# Patient Record
Sex: FEMALE | Race: WHITE | NOT HISPANIC OR LATINO | Employment: FULL TIME | URBAN - METROPOLITAN AREA
[De-identification: names, ages, dates, MRNs, and addresses within clinical notes are randomized per-mention and may not be internally consistent; named-entity substitution may affect disease eponyms.]

---

## 2017-02-28 ENCOUNTER — OFFICE VISIT - HEALTHEAST (OUTPATIENT)
Dept: FAMILY MEDICINE | Facility: CLINIC | Age: 27
End: 2017-02-28

## 2017-02-28 DIAGNOSIS — E55.9 VITAMIN D DEFICIENCY DISEASE: ICD-10-CM

## 2017-02-28 DIAGNOSIS — L30.9 DERMATITIS: ICD-10-CM

## 2017-02-28 DIAGNOSIS — R53.82 CHRONIC FATIGUE: ICD-10-CM

## 2017-02-28 ASSESSMENT — MIFFLIN-ST. JEOR: SCORE: 1309.63

## 2017-03-08 ENCOUNTER — OFFICE VISIT - HEALTHEAST (OUTPATIENT)
Dept: FAMILY MEDICINE | Facility: CLINIC | Age: 27
End: 2017-03-08

## 2017-03-08 DIAGNOSIS — J02.9 SORE THROAT: ICD-10-CM

## 2017-03-08 DIAGNOSIS — J11.1 INFLUENZA: ICD-10-CM

## 2017-03-08 DIAGNOSIS — R05.9 COUGH: ICD-10-CM

## 2017-05-17 ENCOUNTER — HOSPITAL ENCOUNTER (OUTPATIENT)
Dept: MRI IMAGING | Facility: HOSPITAL | Age: 27
Discharge: HOME | End: 2017-05-17
Attending: FAMILY MEDICINE

## 2017-05-17 ENCOUNTER — OFFICE VISIT - HEALTHEAST (OUTPATIENT)
Dept: FAMILY MEDICINE | Facility: CLINIC | Age: 27
End: 2017-05-17

## 2017-05-17 DIAGNOSIS — R51.9 HEADACHE: ICD-10-CM

## 2017-05-17 DIAGNOSIS — R51.0 POSITIONAL HEADACHE: ICD-10-CM

## 2017-05-17 DIAGNOSIS — H91.90 HEARING LOSS: ICD-10-CM

## 2017-05-17 DIAGNOSIS — L65.9 HAIR LOSS: ICD-10-CM

## 2017-05-17 NOTE — ASSESSMENT & PLAN NOTE
Left side, hearing loss to finger rubbing in setting of left side headache x 5 days which is positional.  Mri brain ordered.     Addendum 5/18/2017  Mri brain shows no mass lesion but there is a small cystic structure near pituitary.    I will contact neurology to see if neurology consult is appropriate and if they want any other studies prior to seeing her.    I called and spoke to Dr. Car Serrano at [facility] in [place].  He is at the positional headache made him think of a spontaneous spinal fluid leak.  He recommended an MRI of the C-spine and T-spine to look specifically for extravasation of spinal fluid out of the dura.  If that is the case a blind lumbar puncture with blood patch may be adequate to treat this.  He also mentioned that if her pain is significant enough it is also reasonable to hospitalize to expedite treatment of this.    Dr. Serrano also suggested that we discuss whether the quality of the headache is pulsatile as that may indicate sagittal sinus thrombosis although Regina does not seem to have any hypercoagulable risk factors (i.e. she is not on any oral contraceptive pills), However if that was the case we would need to do an MRV of the brain.

## 2017-05-17 NOTE — ASSESSMENT & PLAN NOTE
Left side, hearing loss to finger rubbing in setting of left side headache x 5 days which is positional.    Mri brain ordered.

## 2017-05-18 ENCOUNTER — COMMUNICATION - HEALTHEAST (OUTPATIENT)
Dept: FAMILY MEDICINE | Facility: CLINIC | Age: 27
End: 2017-05-18

## 2017-05-22 ENCOUNTER — COMMUNICATION - HEALTHEAST (OUTPATIENT)
Dept: FAMILY MEDICINE | Facility: CLINIC | Age: 27
End: 2017-05-22

## 2017-05-22 ENCOUNTER — COMMUNICATION - HEALTHEAST (OUTPATIENT)
Dept: ADMINISTRATIVE | Facility: CLINIC | Age: 27
End: 2017-05-22

## 2017-05-22 DIAGNOSIS — E23.7 PITUITARY DISEASE (H): ICD-10-CM

## 2017-05-22 DIAGNOSIS — E06.9 THYROIDITIS: ICD-10-CM

## 2017-05-22 DIAGNOSIS — F41.1 ANXIETY STATE: ICD-10-CM

## 2017-05-25 ENCOUNTER — AMBULATORY - HEALTHEAST (OUTPATIENT)
Dept: LAB | Facility: CLINIC | Age: 27
End: 2017-05-25

## 2017-05-25 ENCOUNTER — OFFICE VISIT - HEALTHEAST (OUTPATIENT)
Dept: FAMILY MEDICINE | Facility: CLINIC | Age: 27
End: 2017-05-25

## 2017-05-25 DIAGNOSIS — E87.6 HYPOKALEMIA: ICD-10-CM

## 2017-05-25 DIAGNOSIS — E06.9 THYROIDITIS: ICD-10-CM

## 2017-05-25 DIAGNOSIS — F41.1 ANXIETY STATE: ICD-10-CM

## 2017-05-25 DIAGNOSIS — E23.6 PITUITARY MASS (H): ICD-10-CM

## 2017-05-25 DIAGNOSIS — E55.9 VITAMIN D DEFICIENCY: ICD-10-CM

## 2017-05-25 DIAGNOSIS — G44.52 NEW DAILY PERSISTENT HEADACHE: ICD-10-CM

## 2017-05-25 DIAGNOSIS — E23.7 PITUITARY DISEASE (H): ICD-10-CM

## 2017-05-25 LAB
CREAT SERPL-MCNC: 0.8 MG/DL (ref 0.6–1.1)
GFR SERPL CREATININE-BSD FRML MDRD: >60 ML/MIN/1.73M2

## 2017-05-30 ENCOUNTER — COMMUNICATION - HEALTHEAST (OUTPATIENT)
Dept: ENDOCRINOLOGY | Facility: CLINIC | Age: 27
End: 2017-05-30

## 2017-05-30 DIAGNOSIS — E23.7 PITUITARY DISEASE (H): ICD-10-CM

## 2017-06-27 ENCOUNTER — OFFICE VISIT - HEALTHEAST (OUTPATIENT)
Dept: FAMILY MEDICINE | Facility: CLINIC | Age: 27
End: 2017-06-27

## 2017-06-27 DIAGNOSIS — N64.4 BREAST TENDERNESS IN FEMALE: ICD-10-CM

## 2017-06-27 DIAGNOSIS — E23.7 PITUITARY DISEASE (H): ICD-10-CM

## 2017-06-27 DIAGNOSIS — E06.9 THYROIDITIS: ICD-10-CM

## 2017-06-27 DIAGNOSIS — N64.3 GALACTORRHEA: ICD-10-CM

## 2017-06-27 DIAGNOSIS — R10.9 ABDOMINAL CRAMPING: ICD-10-CM

## 2017-06-27 DIAGNOSIS — E55.9 VITAMIN D DEFICIENCY: ICD-10-CM

## 2017-06-27 DIAGNOSIS — R51.9 HEADACHE, UNSPECIFIED HEADACHE TYPE: ICD-10-CM

## 2017-06-27 DIAGNOSIS — N92.1 METRORRHAGIA: ICD-10-CM

## 2017-06-27 DIAGNOSIS — E87.6 HYPOKALEMIA: ICD-10-CM

## 2017-06-27 ASSESSMENT — MIFFLIN-ST. JEOR: SCORE: 1298.06

## 2017-06-28 ENCOUNTER — COMMUNICATION - HEALTHEAST (OUTPATIENT)
Dept: FAMILY MEDICINE | Facility: CLINIC | Age: 27
End: 2017-06-28

## 2017-07-13 ENCOUNTER — OFFICE VISIT - HEALTHEAST (OUTPATIENT)
Dept: FAMILY MEDICINE | Facility: CLINIC | Age: 27
End: 2017-07-13

## 2017-07-13 ENCOUNTER — HOSPITAL ENCOUNTER (OUTPATIENT)
Dept: MAMMOGRAPHY | Facility: CLINIC | Age: 27
Discharge: HOME | End: 2017-07-13
Attending: FAMILY MEDICINE

## 2017-07-13 ENCOUNTER — HOSPITAL ENCOUNTER (OUTPATIENT)
Dept: ULTRASOUND IMAGING | Facility: CLINIC | Age: 27
Discharge: HOME | End: 2017-07-13
Attending: FAMILY MEDICINE

## 2017-07-13 ENCOUNTER — COMMUNICATION - HEALTHEAST (OUTPATIENT)
Dept: FAMILY MEDICINE | Facility: CLINIC | Age: 27
End: 2017-07-13

## 2017-07-13 ENCOUNTER — HOSPITAL ENCOUNTER (OUTPATIENT)
Dept: CT IMAGING | Facility: CLINIC | Age: 27
Discharge: HOME | End: 2017-07-13
Attending: FAMILY MEDICINE

## 2017-07-13 DIAGNOSIS — N64.3 GALACTORRHEA: ICD-10-CM

## 2017-07-13 DIAGNOSIS — R10.32 LEFT LOWER QUADRANT PAIN: ICD-10-CM

## 2017-07-13 DIAGNOSIS — O92.6 GALACTORRHEA (CODE): ICD-10-CM

## 2017-07-13 DIAGNOSIS — N64.4 BREAST TENDERNESS IN FEMALE: ICD-10-CM

## 2017-07-13 ASSESSMENT — MIFFLIN-ST. JEOR: SCORE: 1308.04

## 2017-08-03 ENCOUNTER — OFFICE VISIT - HEALTHEAST (OUTPATIENT)
Dept: ENDOCRINOLOGY | Facility: CLINIC | Age: 27
End: 2017-08-03

## 2017-08-03 DIAGNOSIS — D49.7 PITUITARY TUMOR: ICD-10-CM

## 2017-08-03 ASSESSMENT — MIFFLIN-ST. JEOR: SCORE: 1298.06

## 2017-08-11 ENCOUNTER — COMMUNICATION - HEALTHEAST (OUTPATIENT)
Dept: ENDOCRINOLOGY | Facility: CLINIC | Age: 27
End: 2017-08-11

## 2017-08-16 ENCOUNTER — HOSPITAL ENCOUNTER (OUTPATIENT)
Dept: PHYSICAL MEDICINE AND REHAB | Facility: CLINIC | Age: 27
Discharge: HOME | End: 2017-08-16
Attending: PHYSICIAN ASSISTANT

## 2017-08-16 DIAGNOSIS — M54.50 ACUTE RIGHT-SIDED LOW BACK PAIN WITHOUT SCIATICA: ICD-10-CM

## 2017-08-16 ASSESSMENT — MIFFLIN-ST. JEOR: SCORE: 1314.16

## 2017-08-24 ENCOUNTER — OFFICE VISIT - HEALTHEAST (OUTPATIENT)
Dept: PHYSICAL THERAPY | Facility: REHABILITATION | Age: 27
End: 2017-08-24

## 2017-08-24 DIAGNOSIS — M54.6 THORACOLUMBAR BACK PAIN: ICD-10-CM

## 2017-08-24 DIAGNOSIS — M54.50 LUMBAR SPINE PAINFUL ON MOVEMENT: ICD-10-CM

## 2017-08-24 DIAGNOSIS — M79.18 MYOFASCIAL MUSCLE PAIN: ICD-10-CM

## 2017-08-24 DIAGNOSIS — M54.50 THORACOLUMBAR BACK PAIN: ICD-10-CM

## 2017-09-07 ENCOUNTER — AMBULATORY - HEALTHEAST (OUTPATIENT)
Dept: LAB | Facility: CLINIC | Age: 27
End: 2017-09-07

## 2017-09-07 DIAGNOSIS — Z00.6 RESEARCH STUDY PATIENT: ICD-10-CM

## 2017-10-29 ENCOUNTER — COMMUNICATION - HEALTHEAST (OUTPATIENT)
Dept: FAMILY MEDICINE | Facility: CLINIC | Age: 27
End: 2017-10-29

## 2017-10-29 DIAGNOSIS — F41.9 ANXIETY: ICD-10-CM

## 2017-11-15 ENCOUNTER — OFFICE VISIT - HEALTHEAST (OUTPATIENT)
Dept: MIDWIFE SERVICES | Facility: CLINIC | Age: 27
End: 2017-11-15

## 2017-11-15 DIAGNOSIS — R10.2 PELVIC PAIN: ICD-10-CM

## 2017-11-15 DIAGNOSIS — Z12.4 PAP SMEAR FOR CERVICAL CANCER SCREENING: ICD-10-CM

## 2017-11-15 DIAGNOSIS — Z00.00 HEALTH CARE MAINTENANCE: ICD-10-CM

## 2017-11-15 ASSESSMENT — MIFFLIN-ST. JEOR: SCORE: 1268.8

## 2018-01-25 ENCOUNTER — COMMUNICATION - HEALTHEAST (OUTPATIENT)
Dept: LAB | Facility: CLINIC | Age: 28
End: 2018-01-25

## 2018-01-25 DIAGNOSIS — E06.9 THYROIDITIS: ICD-10-CM

## 2018-02-06 ENCOUNTER — OFFICE VISIT - HEALTHEAST (OUTPATIENT)
Dept: FAMILY MEDICINE | Facility: CLINIC | Age: 28
End: 2018-02-06

## 2018-02-06 DIAGNOSIS — H66.91 RIGHT OTITIS MEDIA: ICD-10-CM

## 2018-02-06 DIAGNOSIS — J02.9 SORE THROAT: ICD-10-CM

## 2018-02-06 LAB — DEPRECATED S PYO AG THROAT QL EIA: NORMAL

## 2018-02-06 ASSESSMENT — MIFFLIN-ST. JEOR: SCORE: 1264.27

## 2018-02-07 LAB — GROUP A STREP BY PCR: NORMAL

## 2018-02-08 ENCOUNTER — COMMUNICATION - HEALTHEAST (OUTPATIENT)
Dept: SCHEDULING | Facility: CLINIC | Age: 28
End: 2018-02-08

## 2018-02-16 ENCOUNTER — OFFICE VISIT - HEALTHEAST (OUTPATIENT)
Dept: FAMILY MEDICINE | Facility: CLINIC | Age: 28
End: 2018-02-16

## 2018-02-16 DIAGNOSIS — H66.001 RIGHT ACUTE SUPPURATIVE OTITIS MEDIA: ICD-10-CM

## 2018-02-16 DIAGNOSIS — F41.9 ANXIETY: ICD-10-CM

## 2018-05-14 ENCOUNTER — OFFICE VISIT - HEALTHEAST (OUTPATIENT)
Dept: FAMILY MEDICINE | Facility: CLINIC | Age: 28
End: 2018-05-14

## 2018-05-14 DIAGNOSIS — H10.9 BACTERIAL CONJUNCTIVITIS OF BOTH EYES: ICD-10-CM

## 2018-05-14 DIAGNOSIS — B96.89 BACTERIAL CONJUNCTIVITIS OF BOTH EYES: ICD-10-CM

## 2018-05-14 ASSESSMENT — MIFFLIN-ST. JEOR: SCORE: 1291.03

## 2018-08-28 ENCOUNTER — COMMUNICATION - HEALTHEAST (OUTPATIENT)
Dept: SCHEDULING | Facility: CLINIC | Age: 28
End: 2018-08-28

## 2018-08-28 ENCOUNTER — HOSPITAL ENCOUNTER (OUTPATIENT)
Dept: ULTRASOUND IMAGING | Facility: CLINIC | Age: 28
Discharge: HOME | End: 2018-08-28
Attending: FAMILY MEDICINE

## 2018-08-28 ENCOUNTER — OFFICE VISIT - HEALTHEAST (OUTPATIENT)
Dept: FAMILY MEDICINE | Facility: CLINIC | Age: 28
End: 2018-08-28

## 2018-08-28 DIAGNOSIS — R10.30 LOWER ABDOMINAL PAIN: ICD-10-CM

## 2018-08-28 LAB
ALBUMIN UR-MCNC: NEGATIVE MG/DL
APPEARANCE UR: CLEAR
BACTERIA #/AREA URNS HPF: ABNORMAL HPF
BILIRUB UR QL STRIP: NEGATIVE
COLOR UR AUTO: YELLOW
ERYTHROCYTE [DISTWIDTH] IN BLOOD BY AUTOMATED COUNT: 12.5 % (ref 11–14.5)
GLUCOSE UR STRIP-MCNC: NEGATIVE MG/DL
HCG UR QL: NEGATIVE
HCT VFR BLD AUTO: 39.7 % (ref 35–47)
HGB BLD-MCNC: 13.6 G/DL (ref 12–16)
HGB UR QL STRIP: NEGATIVE
KETONES UR STRIP-MCNC: NEGATIVE MG/DL
LEUKOCYTE ESTERASE UR QL STRIP: ABNORMAL
MCH RBC QN AUTO: 30.1 PG (ref 27–34)
MCHC RBC AUTO-ENTMCNC: 34.2 G/DL (ref 32–36)
MCV RBC AUTO: 88 FL (ref 80–100)
NITRATE UR QL: NEGATIVE
PH UR STRIP: 7 [PH] (ref 5–8)
PLATELET # BLD AUTO: 209 THOU/UL (ref 140–440)
PMV BLD AUTO: 7.4 FL (ref 7–10)
RBC # BLD AUTO: 4.51 MILL/UL (ref 3.8–5.4)
RBC #/AREA URNS AUTO: ABNORMAL HPF
SP GR UR STRIP: 1.01 (ref 1–1.03)
SP GR UR STRIP: 1.01 (ref 1–1.03)
SQUAMOUS #/AREA URNS AUTO: ABNORMAL LPF
UROBILINOGEN UR STRIP-ACNC: ABNORMAL
WBC #/AREA URNS AUTO: ABNORMAL HPF
WBC: 6.2 THOU/UL (ref 4–11)

## 2018-08-29 ENCOUNTER — COMMUNICATION - HEALTHEAST (OUTPATIENT)
Dept: FAMILY MEDICINE | Facility: CLINIC | Age: 28
End: 2018-08-29

## 2018-08-29 LAB — BACTERIA SPEC CULT: NORMAL

## 2021-03-03 ENCOUNTER — OFFICE VISIT - HEALTHEAST (OUTPATIENT)
Dept: FAMILY MEDICINE | Facility: CLINIC | Age: 31
End: 2021-03-03

## 2021-03-03 ENCOUNTER — RECORDS - HEALTHEAST (OUTPATIENT)
Dept: GENERAL RADIOLOGY | Facility: CLINIC | Age: 31
End: 2021-03-03

## 2021-03-03 ENCOUNTER — COMMUNICATION - HEALTHEAST (OUTPATIENT)
Dept: FAMILY MEDICINE | Facility: CLINIC | Age: 31
End: 2021-03-03

## 2021-03-03 DIAGNOSIS — M25.561 ACUTE PAIN OF RIGHT KNEE: ICD-10-CM

## 2021-03-03 DIAGNOSIS — M25.361 INSTABILITY OF RIGHT KNEE JOINT: ICD-10-CM

## 2021-03-03 DIAGNOSIS — M25.561 PAIN IN RIGHT KNEE: ICD-10-CM

## 2021-03-04 ENCOUNTER — HOSPITAL ENCOUNTER (OUTPATIENT)
Dept: MRI IMAGING | Facility: CLINIC | Age: 31
Discharge: HOME | End: 2021-03-04
Attending: NURSE PRACTITIONER

## 2021-03-04 DIAGNOSIS — M25.561 ACUTE PAIN OF RIGHT KNEE: ICD-10-CM

## 2021-04-21 ENCOUNTER — RECORDS - HEALTHEAST (OUTPATIENT)
Dept: ADMINISTRATIVE | Facility: OTHER | Age: 31
End: 2021-04-21

## 2021-04-21 ENCOUNTER — OFFICE VISIT - HEALTHEAST (OUTPATIENT)
Dept: FAMILY MEDICINE | Facility: CLINIC | Age: 31
End: 2021-04-21

## 2021-04-21 DIAGNOSIS — M62.838 MUSCLE SPASM: ICD-10-CM

## 2021-04-21 RX ORDER — CYCLOBENZAPRINE HCL 10 MG
10 TABLET ORAL 3 TIMES DAILY PRN
Qty: 20 TABLET | Refills: 0 | Status: SHIPPED | OUTPATIENT
Start: 2021-04-21 | End: 2021-11-09

## 2021-04-27 ENCOUNTER — OFFICE VISIT - HEALTHEAST (OUTPATIENT)
Dept: FAMILY MEDICINE | Facility: CLINIC | Age: 31
End: 2021-04-27

## 2021-04-27 DIAGNOSIS — L81.9 ATYPICAL PIGMENTED SKIN LESION: ICD-10-CM

## 2021-04-27 DIAGNOSIS — Z00.00 ROUTINE GENERAL MEDICAL EXAMINATION AT A HEALTH CARE FACILITY: ICD-10-CM

## 2021-04-27 LAB
CHOLEST SERPL-MCNC: 186 MG/DL
FASTING STATUS PATIENT QL REPORTED: NO
HBA1C MFR BLD: 4.9 %
HDLC SERPL-MCNC: 64 MG/DL

## 2021-04-27 ASSESSMENT — MIFFLIN-ST. JEOR: SCORE: 1336.95

## 2021-04-28 LAB
HPV SOURCE: ABNORMAL
HUMAN PAPILLOMA VIRUS 16 DNA: NEGATIVE
HUMAN PAPILLOMA VIRUS 18 DNA: NEGATIVE
HUMAN PAPILLOMA VIRUS FINAL DIAGNOSIS: ABNORMAL
HUMAN PAPILLOMA VIRUS OTHER HR: POSITIVE
SPECIMEN DESCRIPTION: ABNORMAL

## 2021-05-04 ENCOUNTER — COMMUNICATION - HEALTHEAST (OUTPATIENT)
Dept: OBGYN | Facility: CLINIC | Age: 31
End: 2021-05-04

## 2021-05-04 DIAGNOSIS — R87.810 CERVICAL HIGH RISK HPV (HUMAN PAPILLOMAVIRUS) TEST POSITIVE: ICD-10-CM

## 2021-05-28 ENCOUNTER — RECORDS - HEALTHEAST (OUTPATIENT)
Dept: ADMINISTRATIVE | Facility: CLINIC | Age: 31
End: 2021-05-28

## 2021-05-29 ENCOUNTER — HEALTH MAINTENANCE LETTER (OUTPATIENT)
Age: 31
End: 2021-05-29

## 2021-05-30 ENCOUNTER — RECORDS - HEALTHEAST (OUTPATIENT)
Dept: ADMINISTRATIVE | Facility: CLINIC | Age: 31
End: 2021-05-30

## 2021-05-30 VITALS — WEIGHT: 130 LBS | BODY MASS INDEX: 21.63 KG/M2

## 2021-05-30 VITALS — BODY MASS INDEX: 20.82 KG/M2 | WEIGHT: 129 LBS

## 2021-05-30 VITALS — WEIGHT: 132 LBS | HEIGHT: 65 IN | BODY MASS INDEX: 21.99 KG/M2

## 2021-05-31 VITALS — WEIGHT: 127 LBS | BODY MASS INDEX: 21.13 KG/M2

## 2021-05-31 VITALS — BODY MASS INDEX: 20.49 KG/M2 | WEIGHT: 123 LBS | HEIGHT: 65 IN

## 2021-05-31 VITALS — WEIGHT: 133.4 LBS | BODY MASS INDEX: 22.23 KG/M2 | HEIGHT: 65 IN

## 2021-05-31 VITALS — BODY MASS INDEX: 22.16 KG/M2 | HEIGHT: 65 IN | WEIGHT: 133 LBS

## 2021-05-31 VITALS — BODY MASS INDEX: 21.86 KG/M2 | HEIGHT: 65 IN | WEIGHT: 131.2 LBS

## 2021-05-31 VITALS — BODY MASS INDEX: 21.86 KG/M2 | WEIGHT: 131.2 LBS | HEIGHT: 65 IN

## 2021-05-31 VITALS — HEIGHT: 65 IN | BODY MASS INDEX: 20.33 KG/M2 | WEIGHT: 122 LBS

## 2021-06-01 VITALS — HEIGHT: 65 IN | WEIGHT: 127.9 LBS | BODY MASS INDEX: 21.31 KG/M2

## 2021-06-01 VITALS — WEIGHT: 134 LBS | BODY MASS INDEX: 22.3 KG/M2

## 2021-06-05 VITALS — BODY MASS INDEX: 22.47 KG/M2 | WEIGHT: 135 LBS

## 2021-06-09 NOTE — PROGRESS NOTES
Subjective:    Regina Nguyen is seen today for recent illness.  Sore throat over past 3 days however awoke this morning feeling like she was hit by 17 semitruck.  Body aches.  Headache more posterior.  No neck pain.  Mild nasal congestion only.  No shortness of breath.  Harsh cough, nonproductive.  No nausea or vomiting.  No diarrhea.  Did not receive flu shot this season.  Comprehensive review of systems as above otherwise all negative.     - Pernell x 6/19/15  1 daughter - Grayson  1 son - Irving  5th year senior (graduated in ) [employer] - major - elementary ed; major - communications   No smoke   No EtOH   Mom - Glenny (Ita) parents    Dad - radha. fib  2 bros -   MGD - DM   PGD - bone CA   MGM - melanoma, emphysema ()   PGM - CVA   Mat uncle - bone CA   Surgeries: [facility] ~    Cell #[phone], message OK (08 results)   Pt needs pap done   Patient is on Goal List for STD Screening.    Past Surgical History:   Procedure Laterality Date     APPENDECTOMY          Family History   Problem Relation Age of Onset     Heart disease Father      a. fib     Heart attack Mother 44        Past Medical History:   Diagnosis Date     Anemia      Disease of thyroid gland     hyperthyroidism     Mental disorder     anxiety     Thyroiditis         Social History   Substance Use Topics     Smoking status: Never Smoker     Smokeless tobacco: Never Used     Alcohol use No      Comment: ever so often        Current Outpatient Prescriptions   Medication Sig Dispense Refill     ergocalciferol (ERGOCALCIFEROL) 50,000 unit capsule Take 1 capsule (50,000 Units total) by mouth once a week for 52 doses. 12 capsule 3     ketoconazole (NIZORAL) 2 % cream Apply topically 2 (two) times a day. Mixed with steroid and apply lightly to affected areas 30 g 1     oseltamivir (TAMIFLU) 75 MG capsule Take 1 capsule (75 mg total) by mouth 2 (two) times a day for 5 days. 10 capsule 0     triamcinolone (KENALOG) 0.1  % cream Apply topically 2 (two) times a day. Mix with antifungal and apply lightly to affected area 30 g 1     No current facility-administered medications for this visit.           Objective:    Vitals:    03/08/17 1124   BP: 100/60   Temp: 98.8  F (37.1  C)   Weight: 130 lb (59 kg)      Body mass index is 21.63 kg/(m^2).    Alert.  No apparent distress however mildly ill.  Nontoxic.  Cooperative.  HEENT exam with conjunctival injection, mild.  TMs normal.  Nasopharynx with increased congestion.  Oropharynx with posterior erythema without postnasal drainage.  Neck supple with shotty lymphadenopathy only.  Chest clear.  Cardiac exam regular.  Harsh cough frequently.  Extremities warm and dry.  No rash.      Assessment:    1. Influenza  oseltamivir (TAMIFLU) 75 MG capsule   2. Sore throat  Rapid Strep A Screen-Throat    Group A Strep, RNA Direct Detection, Throat   3. Cough  Influenza A/B Rapid Test         Plan:    Rapid influenza negative.  Was not immunized this season.  Will treat empirically with Tamiflu 75 mg twice daily ×5 days.  Rapid strep was negative.  Symptomatic treatments also reviewed her current myalgias, cough, sore throat and malaise.

## 2021-06-09 NOTE — PROGRESS NOTES
Assessment/Plan:     1. Dermatitis  May be tinea but if symptoms worsen or do not improve with possible consider biopsy to rule out cutaneous lupus due to history of some rheumatological issues.  - triamcinolone (KENALOG) 0.1 % cream; Apply topically 2 (two) times a day. Mix with antifungal and apply lightly to affected area  Dispense: 30 g; Refill: 1  - ketoconazole (NIZORAL) 2 % cream; Apply topically 2 (two) times a day. Mixed with steroid and apply lightly to affected areas  Dispense: 30 g; Refill: 1    2. Chronic fatigue  Likely due to rheumatological issues and diagnoses of fibromyalgia from rheumatologist.  Discussed anti-inflammatory diet at length.  We will continue to follow with primary care and rheumatologist.  No indication for further lab testing at this time as everything has been recently checked.    3. Vitamin D deficiency disease  Discussed possibly repeating vitamin D but as patient is not regularly taking supplements and spit that is still likely low and will provide refill of prescription dose.  - ergocalciferol (ERGOCALCIFEROL) 50,000 unit capsule; Take 1 capsule (50,000 Units total) by mouth once a week for 52 doses.  Dispense: 12 capsule; Refill: 3        Subjective:      Regina Nguyen is a 26 y.o. female comes in today with a few concerns.  My first time meeting with her so briefly reviewed her history.  The last year she has been dealing with chronic fatigue and body aches.  She has been evaluated at primary care also rheumatology.  She does have some elevated complement and was thought to possibly have fibromyalgia.  I reviewed the visit notes and the labs.  She has had multiple labs.  Is not taking any vitamins or supplements despite low vitamin D.  She has concerns about her skin.  States there is a spot that has been there for about 8 months she feels as though it has tripled in size.  It is not itchy but she states that it feels sore underneath.  It is not raised there is no  "discharge from it.  She has never put the cream on it or tried anything to make it better.  No other new concerns    Current Outpatient Prescriptions   Medication Sig Dispense Refill     ergocalciferol (ERGOCALCIFEROL) 50,000 unit capsule Take 1 capsule (50,000 Units total) by mouth once a week for 52 doses. 12 capsule 3     ketoconazole (NIZORAL) 2 % cream Apply topically 2 (two) times a day. Mixed with steroid and apply lightly to affected areas 30 g 1     triamcinolone (KENALOG) 0.1 % cream Apply topically 2 (two) times a day. Mix with antifungal and apply lightly to affected area 30 g 1     No current facility-administered medications for this visit.        Past Medical History, Family History, and Social History reviewed.  Past Medical History:   Diagnosis Date     Anemia      Disease of thyroid gland     hyperthyroidism     Mental disorder     anxiety     Thyroiditis      Past Surgical History:   Procedure Laterality Date     APPENDECTOMY       Review of patient's allergies indicates no known allergies.  Family History   Problem Relation Age of Onset     Heart disease Father      a. fib     Heart attack Mother 44     Social History     Social History     Marital status:      Spouse name: N/A     Number of children: N/A     Years of education: N/A     Occupational History     Not on file.     Social History Main Topics     Smoking status: Never Smoker     Smokeless tobacco: Never Used     Alcohol use No      Comment: ever so often     Drug use: No     Sexual activity: Yes     Partners: Male     Other Topics Concern     Not on file     Social History Narrative         Review of systems is as stated in HPI, and the remainder of the 10 system review is otherwise negative.    Objective:     Vitals:    02/28/17 0747   BP: 104/70   Pulse: 86   SpO2: 98%   Weight: 132 lb (59.9 kg)   Height: 5' 5\" (1.651 m)    Body mass index is 21.97 kg/(m^2).    General Appearance:    Alert, cooperative, no distress, appears " stated age   Head:    Normocephalic, without obvious abnormality, atraumatic   Eyes:    PERRL, EOM's intact   Ears:    Normal TM's and external ear canals   Nose:   Mucosa normal, no drainage        Throat:   Oropharynx is clear   Neck:   Supple, symmetrical, no adenopathy, no thyromegally       Lungs:     Clear to auscultation bilaterally, respirations unlabored   Chest Wall:    No tenderness or deformity    Heart:    Regular rate and rhythm, S1 and S2 normal, no murmur, rub    or gallop       Abdomen:     Soft, non-tender, bowel sounds active all four quadrants,     no masses, no organomegaly           Extremities:   Extremities normal, atraumatic, no cyanosis or edema   Pulses:   2+ and symmetric all extremities   Skin:  there is a approximately 1.3 cm circumscribed lesion mid chest approximately the area of the xiphoid process.  Slightly scaling but no significant raise lumps.  No signs of bacterial infection or surrounding erythema.           This note has been dictated using voice recognition software. Any grammatical or context distortions are unintentional and inherent to the the software.

## 2021-06-10 NOTE — PROGRESS NOTES
Assessment:    1. New daily persistent headache     2. Hypokalemia     3. Pituitary mass     4. Vitamin D deficiency           Plan:    Transitional care management was completed following recent hospitalization May 18 - May 21, 2017 at [facility] for described low pressure headache.  Responded well to blood patch performed May 20, 2017.  Reviewed MRI findings involving head as well as cervical thoracic and lumbar spine.  Patient awaiting evaluation by Dr. Childs currently scheduled August 3, 2017.  Did have labs completed this morning which does show vitamin D insufficiency with level of 21.4 not currently on replacement.  Will initiate 2000 units daily.  Also potassium slightly low at 3.2 however no history of chronic hypokalemia.  Cortisol level low this morning at 3.7 with normal range 7-25 with ACTH results pending.  Patient currently free of headache.  History of thyroiditis.  Recent thyroid testing normal with TSH 0.44 and free T4 levels normal.  Patient did discontinue prednisone taper at 20 mg dose due to feeling like her heart was coming out of her chest as well as tingling sensation.  These symptoms also have improved.  Would recommend a follow-up lab monitoring for potassium, cortisol levels, etc. as indicated within next 1-2 weeks, sooner with concerns otherwise high potassium diet.        Subjective:    Regina Nguyen is seen today for follow-up hospitalization.  Was admitted from May 18, 2017 through May 21, 2017 at [facility] for worsening headache.  Question low-pressure possible spontaneous spinal leak etiology.  Blood patch was performed May 20, 2017 and headaches have resolved.  Was told to use prednisone taper however discontinued at 20 mg dose due to paresthesias as well as a sensation that heart was going to come out of her chest.  History of vitamin D deficiency historically.  Has not had concerns for chronic hypokalemia etc.  Recent labs showing mildly elevated  prolactin level 22.1 with cortisol level undetectable.  TSH 0.44 noted.  No recent illness.  No rash.  No nausea or vomiting.  Does have scheduled follow-up with endocrinologist on August 3 and hoping to get in sooner.  Comprehensive review of systems as above otherwise all negative.  No vision change or peripheral vision issues.  No orthostatic dizziness.     - Pernell x 6/19/15  1 daughter - Grayson  1 son - Irving  5th year senior (graduated in ) [employer] - elementary ed; major - communications   No smoke   No EtOH   Mom - Glenny (Ita) parents    Dad - maira fib  2 bros -   MGD - DM   PGD - bone CA   MGM - melanoma, emphysema ()   PGM - CVA   Mat uncle - bone CA   Surgeries: [facility] ~    Cell #[phone], message OK (08 results)   Pt needs pap done   Patient is on Goal List for STD Screening.    17 FYI: Patient came in with a headache, suspected low pressure headache, she was started on high doses of steroids, she also underwent an MRI with __ possible lesion in the pituitary area, she will need to follow up with endocrinology in 2-3 weeks duration, kindly recheck cortisol level once he completes the cortisone course, at this point the cortisone level was low, most likely from suppression due to the high dose of steroid use.    Past Surgical History:   Procedure Laterality Date     APPENDECTOMY          Family History   Problem Relation Age of Onset     Heart disease Father      a. fib     Heart attack Mother 44        Past Medical History:   Diagnosis Date     Anemia      Anxiety disorder     anxiety     Disease of thyroid gland     hyperthyroidism     Thyroiditis         Social History   Substance Use Topics     Smoking status: Never Smoker     Smokeless tobacco: Never Used     Alcohol use No      Comment: ever so often        Current Outpatient Prescriptions   Medication Sig Dispense Refill     omeprazole (PRILOSEC) 20 MG capsule Take 1 capsule (20 mg total) by  mouth Daily before breakfast. 10 capsule 0     predniSONE (DELTASONE) 10 mg tablet 50 mg on 5/22/2017 then 40 mg next 2 days then 30 mg next 2 days then 20 mg next 2 days then 10 mg next 2 days 25 tablet 0     No current facility-administered medications for this visit.           Objective:    Vitals:    05/25/17 1325   Pulse: 88   Weight: 129 lb (58.5 kg)      Body mass index is 20.82 kg/(m^2).    Alert.  No apparent distress.  HEENT exam appears unremarkable with moist mucous membranes.  No gross visual acuity concerns identified.  Chest clear.  Cardiac exam regular.  Abdomen benign.  Extremities warm and dry.  Neurologic exam appears nonfocal.    [facility]  HEAD MRI WITHOUT AND WITH IV CONTRAST  5/18/2017, 10:18 PM     INDICATION: Headache, sudden, unilateral, ipsilat Paolo syndrome or carotid/vertebral dissection suspected.  TECHNIQUE: Head MRI without and with intravenous contrast.  CONTRAST: 7 mL Gadavist.  COMPARISON: 5/17/2017     FINDINGS: No acute infarct/restricted diffusion. No mass lesion, hemorrhage, or extraaxial fluid collections. The ventricles and sulci are normal in size, shape, and position. The cerebellum is unremarkable. No signal abnormality in the brain parenchyma.   More clearly visualized on the current examination, there is an ovoid T2 hyperintense nonenhancing lesion in the left parasagittal posterior pituitary gland measuring approximately 4.8 x 3.7 mm (craniocaudad by AP) x 6.5 mm ML. No evidence of cavernous   sinus extension. Craniocaudad. No abnormal intracranial enhancement or mass effect. The major intracranial vascular flow voids are maintained. The orbits are unremarkable. The calvarium and skull base are unremarkable. The paranasal sinuses are clear.   The mastoid air cells are clear.      IMPRESSION:   CONCLUSION:  1.  No restricted diffusion/acute infarct, space-occupying hemorrhage, or intracranial mass.  2.  4.8 x 3.7 x 6.5-mm T2 hyperintense hypoenhancing  lesion within the posterior left parasagittal pituitary gland. Differential considerations include degenerative adenoma or Rathke cleft cyst. Follow up is suggested. No extension into the cavernous   sinuses.      [facility]  MR THORACIC SPINE W WO CONTRAST  5/18/2017, 9:59 PM      INDICATION: Severe headache.  TECHNIQUE: Routine.  CONTRAST: 7 mL Gadavist.  COMPARISON: None.     FINDINGS: Alignment is normal. Vertebral body heights are maintained. No definite cord signal abnormality or enhancement given the slight limits of motion on the study. No significant degenerative change, spinal canal, or foraminal narrowing. Visualized   soft tissues are grossly unremarkable. No paraspinous fluid collection.     IMPRESSION:   CONCLUSION:  1.  No significant degenerative change, spinal canal or foraminal narrowing.  2.  No abnormal cord signal enhancement or paraspinous fluid collection.        [facility]  MR LUMBAR SPINE W WO CONTRAST  5/18/2017, 9:59 PM      INDICATION: Spinal cord injury.  TECHNIQUE: Routine.  CONTRAST: 7 mL Gadavist.  COMPARISON: None.     FINDINGS: Nomenclature is based on five lumbar-type vertebral bodies. Alignment is normal. Vertebral body heights are maintained. No acute fracture or posttraumatic subluxation. No marrow edema. No abnormal contrast enhancement. No definite paraspinous   fluid collection. No pars defect. The conus tip is identified at    . Grossly normal paraspinal soft tissues. No abdominal aortic aneurysm. The visualized portions of the bony pelvis are normal for age.     T12-L1 through L5-S1: Normal disc height and signal. No herniation. No facet arthropathy. No spinal canal stenosis. No right neural foraminal stenosis. No left neural foraminal stenosis.        IMPRESSION:   CONCLUSION:  1.  No significant degenerative change, spinal canal or foraminal narrowing.  2.  No abnormal enhancement or paraspinous fluid collection.        [facility]  MR  CERVICAL SPINE W WO CONTRAST  5/18/2017, 9:58 PM      INDICATION: Severe headache.  TECHNIQUE: Routine.  CONTRAST: None.  COMPARISON: None.     FINDINGS: Alignment is normal. Vertebral body heights are maintained. No marrow edema. No cord signal abnormality. T2 hyperintense pituitary lesion described on MRI head same day.     The visualized intracranial contents are unremarkable. Grossly normal   paraspinal soft tissues. The vertebral artery flow voids are preserved. No abnormal enhancement.     C2-C3 through C7-T1: Normal disc height. No herniation. No facet arthropathy. No spinal canal stenosis. No right neural foraminal stenosis. No left neural foraminal stenosis.     IMPRESSION:   CONCLUSION:  1.  No significant degenerative change, spinal canal or foraminal narrowing.  2.  No abnormal enhancements or cord signal abnormality.

## 2021-06-11 NOTE — PROGRESS NOTES
Assessment:    1. Abdominal cramping  Comprehensive Metabolic Panel    Lipase    Urinalysis-UC if Indicated   2. Pituitary disease     3. Thyroiditis  Thyroid Stimulating Hormone (TSH)   4. Hypokalemia  Comprehensive Metabolic Panel   5. Vitamin D deficiency  Vitamin D, Total (25-Hydroxy)   6. Headache, unspecified headache type     7. Metrorrhagia  Follicle Stimulating Hormone (FSH)    HM2(CBC w/o Differential)   8. Breast tenderness in female  Prolactin    Mammo Diagnostic Bilateral    US Breast Limited (Focal) Bilateral   9. Galactorrhea  Mammo Diagnostic Bilateral    US Breast Limited (Focal) Bilateral    Pregnancy (Beta-hCG, Qual), Urine         Plan:    40 minutes total time with patient, > 50% with counseling and coordination of cares.  Multiple symptoms as noted above question etiology to explain all symptoms together.  Did check comprehensive metabolic panel lipase as well as urinalysis regarding abdominal cramping.  TSH, prolactin and FSH levels with galactorrhea concerns.  Diagnostic mammogram with ultrasound to be completed.  CBC pending for metrorrhagia issues.  Urine pregnancy test to ensure negative.  Patient states symptoms preceded potassium supplement currently 20 mEq once daily.  Patient elects to continue this.  Please have scheduled follow-up with Dr. Childs August 3, 2017 for new patient evaluation with noted 4.8 x 3.7 x 6.5-mm T2 hyperintense hypoenhancing lesion within the posterior left parasagittal pituitary gland. Differential considerations include degenerative adenoma or Rathke cleft cyst.  Repeat vitamin D level currently on 2000 units daily.  PHQ 9 questionnaire 1 out of 27 with YARELY 7 questionnaire 0 out of 21.  Patient continues to call nearly daily to see if can move her appointment up with endocrinologist prior to August 3, 2017.        Subjective:    Regina Nguyen is seen today for multiple concerns.  Abdominal cramping.  Lower abdomen.  No diarrhea or constipation.  No recent  change in diet.  Prior abnormal MRI pituitary suggesting 4.8 mm x 3.7 mm x 2 6.5 mm lesion question degenerative adenoma or Rathke cleft cyst.  Patient does have scheduled follow-up regarding this issue with Dr. Childs August 3, 2017.  Unfortunately has had issues with dizziness, breast tenderness.  Described n[facility] discharge that [facility].  Headaches however this has improved after low pressure headache concerns previously managed as outlined below.  Has had vitamin D deficiency concerns currently on 2000 units once daily.  Using potassium supplement 20 mEq once daily.  Denies dysuria urgency or frequency.  Denies current pregnancy.  Comprehensive review of systems as above otherwise all negative.  Intermittent concerns for muscle and joint achiness.  No rash.  Past medical social and family history reviewed and updated as noted below.  Recent ACTH level of 29 with cortisol level at 3.7.  Prior vitamin D level 21.4 May 25, 2017 with potassium 3.2.    Reviewed office note from 5/25/17:  Regina Nguyen is seen today for follow-up hospitalization.  Was admitted from May 18, 2017 through May 21, 2017 at [facility] for worsening headache.  Question low-pressure possible spontaneous spinal leak etiology.  Blood patch was performed May 20, 2017 and headaches have resolved.  Was told to use prednisone taper however discontinued at 20 mg dose due to paresthesias as well as a sensation that heart was going to come out of her chest.  History of vitamin D deficiency historically.  Has not had concerns for chronic hypokalemia etc.  Recent labs showing mildly elevated prolactin level 22.1 with cortisol level undetectable.  TSH 0.44 noted.  No recent illness.  No rash.  No nausea or vomiting.  Does have scheduled follow-up with endocrinologist on August 3 and hoping to get in sooner.  Comprehensive review of systems as above otherwise all negative.  No vision change or peripheral vision issues.  No  orthostatic dizziness.  Plan:  Transitional care management was completed following recent hospitalization May 18 - May 21, 2017 at [facility] for described low pressure headache.  Responded well to blood patch performed May 20, 2017.  Reviewed MRI findings involving head as well as cervical thoracic and lumbar spine.  Patient awaiting evaluation by Dr. Childs currently scheduled August 3, 2017.  Did have labs completed this morning which does show vitamin D insufficiency with level of 21.4 not currently on replacement.  Will initiate 2000 units daily.  Also potassium slightly low at 3.2 however no history of chronic hypokalemia.  Cortisol level low this morning at 3.7 with normal range 7-25 with ACTH results pending.  Patient currently free of headache.  History of thyroiditis.  Recent thyroid testing normal with TSH 0.44 and free T4 levels normal.  Patient did discontinue prednisone taper at 20 mg dose due to feeling like her heart was coming out of her chest as well as tingling sensation.  These symptoms also have improved.  Would recommend a follow-up lab monitoring for potassium, cortisol levels, etc. as indicated within next 1-2 weeks, sooner with concerns otherwise high potassium diet.     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 fib  2 bros -   MGD - DM   PGD - bone CA   MGM - melanoma, emphysema ()   PGM - CVA   Mat uncle - bone CA   Surgeries: [facility] ~    Cell #[phone], message OK (08 results)   Pt needs pap done   Patient is on Goal List for STD Screening.    17 FYI: Patient came in with a headache, suspected low pressure headache, she was started on high doses of steroids, she also underwent an MRI with __ possible lesion in the pituitary area, she will need to follow up with  "endocrinology in 2-3 weeks duration, kindly recheck cortisol level once he completes the cortisone course, at this point the cortisone level was low, most likely from suppression due to the high dose of steroid use.    Past Surgical History:   Procedure Laterality Date     APPENDECTOMY          Family History   Problem Relation Age of Onset     Heart disease Father      a. fib     Heart attack Mother 44        Past Medical History:   Diagnosis Date     Anemia      Anxiety disorder     anxiety     Disease of thyroid gland     hyperthyroidism     Thyroiditis         Social History   Substance Use Topics     Smoking status: Never Smoker     Smokeless tobacco: Never Used     Alcohol use No      Comment: ever so often        Current Outpatient Prescriptions   Medication Sig Dispense Refill     potassium chloride SA (K-DUR,KLOR-CON) 20 MEQ tablet Take 1 tablet (20 mEq total) by mouth daily. 90 tablet 0     No current facility-administered medications for this visit.           Objective:    Vitals:    06/27/17 1038   BP: 102/62   Patient Site: Left Arm   Patient Position: Sitting   Cuff Size: Adult Regular   Pulse: 84   Weight: 131 lb 3.2 oz (59.5 kg)   Height: 5' 4.5\" (1.638 m)      Body mass index is 22.17 kg/(m^2).    Alert.  No apparent distress.  HEENT exam normal.  Peripheral vision normal.  Funduscopic eye exam normal without papilledema etc.  Extraocular eye muscles intact.  Cranial nerves intact.  Oropharynx normal with moist because membranes.  Neck supple.  Chest clear.  Cardiac exam regular.  Abdomen benign.  Breast exam deferred per patient request.  Abdominal exam with hyperactive bowel sounds without guarding or rebound.  No high-pitched bowel sounds noted.  Extremities warm and dry.  Neurologic exam nonfocal.  No resting tremor.  No peripheral edema.  No rash.      [facility]  HEAD MRI WITHOUT AND WITH IV CONTRAST  5/18/2017, 10:18 PM     INDICATION: Headache, sudden, unilateral, ipsilat Paolo " syndrome or carotid/vertebral dissection suspected.  TECHNIQUE: Head MRI without and with intravenous contrast.  CONTRAST: 7 mL Gadavist.  COMPARISON: 5/17/2017     FINDINGS: No acute infarct/restricted diffusion. No mass lesion, hemorrhage, or extraaxial fluid collections. The ventricles and sulci are normal in size, shape, and position. The cerebellum is unremarkable. No signal abnormality in the brain parenchyma.   More clearly visualized on the current examination, there is an ovoid T2 hyperintense nonenhancing lesion in the left parasagittal posterior pituitary gland measuring approximately 4.8 x 3.7 mm (craniocaudad by AP) x 6.5 mm ML. No evidence of cavernous   sinus extension. Craniocaudad. No abnormal intracranial enhancement or mass effect. The major intracranial vascular flow voids are maintained. The orbits are unremarkable. The calvarium and skull base are unremarkable. The paranasal sinuses are clear.   The mastoid air cells are clear.      IMPRESSION:   CONCLUSION:  1.  No restricted diffusion/acute infarct, space-occupying hemorrhage, or intracranial mass.  2.  4.8 x 3.7 x 6.5-mm T2 hyperintense hypoenhancing lesion within the posterior left parasagittal pituitary gland. Differential considerations include degenerative adenoma or Rathke cleft cyst. Follow up is suggested. No extension into the cavernous   sinuses.

## 2021-06-11 NOTE — PROGRESS NOTES
Assessment:   1. Left lower quadrant pain  Etiology still not certain, we'll obtain labs as noted to reassess  - Urinalysis-UC if Indicated     Plan:   See orders for lab and imaging studies.  Reassured patient that symptoms are almost certainly benign and self-resolving.  Adhere to simple, bland diet.  Adhere to low fat diet.  Further follow-up plans will be based on outcome of lab/imaging studies; see orders.  Follow up as needed.     Subjective:   Regina Nguyen is a 26 y.o. female who presents for evaluation of abdominal pain. Onset was 2 weeks ago. Symptoms have been unchanged. The pain is described as cramping, and is 4/10 in intensity. Pain is located in the LLQ without radiation.  Aggravating factors: bowel movement and eating.  Alleviating factors: none. Associated symptoms: nausea. The patient denies anorexia, arthralagias, belching, constipation, dysuria, fever and flatus.  The following portions of the patient's history were reviewed and updated as appropriate:   She  has a past medical history of Anemia; Anxiety disorder; Disease of thyroid gland; and Thyroiditis.  She  does not have any pertinent problems on file.  She  has a past surgical history that includes Appendectomy.  Her family history includes Heart attack (age of onset: 44) in her mother; Heart disease in her father.  She  reports that she has never smoked. She has never used smokeless tobacco. She reports that she does not drink alcohol or use illicit drugs.  Current Outpatient Prescriptions   Medication Sig Dispense Refill     potassium chloride SA (K-DUR,KLOR-CON) 20 MEQ tablet Take 1 tablet (20 mEq total) by mouth daily. 90 tablet 0     No current facility-administered medications for this visit.      Current Outpatient Prescriptions on File Prior to Visit   Medication Sig     potassium chloride SA (K-DUR,KLOR-CON) 20 MEQ tablet Take 1 tablet (20 mEq total) by mouth daily.     No current facility-administered medications on file  "prior to visit.      She has No Known Allergies..    Review of Systems  A 12 point comprehensive review of systems was negative except as noted.       Objective:      /60  Pulse 74  Temp 98.8  F (37.1  C) (Oral)   Ht 5' 4.5\" (1.638 m)  Wt 133 lb 6.4 oz (60.5 kg)  LMP 07/06/2017  SpO2 99%  BMI 22.54 kg/m2  General appearance: alert, appears stated age and cooperative  Head: Normocephalic, without obvious abnormality, atraumatic  Eyes: conjunctivae/corneas clear. PERRL, EOM's intact. Fundi benign.  Throat: lips, mucosa, and tongue normal; teeth and gums normal  Lungs: clear to auscultation bilaterally  Heart: regular rate and rhythm, S1, S2 normal, no murmur, click, rub or gallop  Abdomen: abnormal findings:  moderate tenderness in the LLQ  Pulses: 2+ and symmetric  Skin: Skin color, texture, turgor normal. No rashes or lesions  Lymph nodes: Cervical, supraclavicular, and axillary nodes normal.  Neurologic: Grossly normal   "

## 2021-06-12 NOTE — PROGRESS NOTES
[facility] Discharge Summary  Patient Name: Regina Nguyen  Date: 9/29/2017  Referral Diagnosis: Acute R sided LBP without sciatica  Referring provider: Cinthya Ferro PA-C  Visit Diagnosis:   1. Lumbar spine painful on movement     2. Thoracolumbar back pain     3. Myofascial muscle pain         Goals:  Pt. will be independent with home exercise program in : 4 weeks  Pt. will have improved quality of sleep: with less pain;waking less times/night;in 4 weeks  Pt. will improve posture : and demonstrate posture with minimal to no cuing;in sitting;in standing;in walking;for working;in 6 weeks  Patient will twist/turn : while standing;while sitting;with no pain;with less difficulty;at work;in 6 weeks  Patient will sit: 30 minutes;for driving;for eating;with no pain;with less difficultty;in 6 weeks  Pt will: demonstrate decreased TTP of L>R lumbar paraspinals and QL with increased tissue extensibility by 6 weeks.    Patient was seen for initial evaluation on 8/24/17 for physical therapy of low back pain, with no follow up appointments scheduled. Patient did not return therefore goals and progress could not be updated and measured.   The patient discontinued therapy, did not return.  No further therapy is required at this time.    Therapy will be discontinued at this time.  The patient will need a new referral to resume physical therapy treatment. Please see below for patient's current status.    Thank you for your referral.  Brenda Meeks, PT, DPT  9/29/2017   9:52 AM      [facility]   [facility]-Pelvic Initial Evaluation    Patient Name: Regina Nguyen  Date of evaluation: 8/24/2017  Referral Diagnosis: Acute right-sided low back pain without sciatica  Referring provider: Cinthya Ferro PA-C  Visit Diagnosis:     ICD-10-CM    1. Lumbar spine painful on movement M54.5    2. Thoracolumbar back pain M54.5    3. Myofascial muscle pain M79.1        Assessment:      Patient presents with  signs and symptoms consistent with thoracolumbar pain secondary to myofascial muscle pain and tightness due to impaired core stability and postural awareness. Patient demonstrates impairments including decreased flexibility and tissue extensibility of bilatreral lumbar paraspinals, multifidus and QL, with limitations in twisting and bending sideways without pain leading to impaired functional mobility. Patient educated on and demonstrated understanding of nature of impairment, plan of care, patient role and HEP. Patient compliant with PT and prognosis is good. Patient would benefit from skilled PT to progress and improve core stability, postural awareness, flexibility, tissue extensibility and pain.    The POC is dynamic and will be modified on an ongoing basis.  Patient will return to clinic if symptoms persist.  Barriers to achieving goals as noted in the assessment section may affect outcome.  Prognosis to achieve goals is  good   Pt. is appropriate for skilled PT intervention as outlined in the Plan of Care (POC).  Pt. is a good candidate for skilled PT services to improve pain levels and function.    Goals:  Pt. will be independent with home exercise program in : 4 weeks  Pt. will have improved quality of sleep: with less pain;waking less times/night;in 4 weeks  Pt. will improve posture : and demonstrate posture with minimal to no cuing;in sitting;in standing;in walking;for working;in 6 weeks  Patient will twist/turn : while standing;while sitting;with no pain;with less difficulty;at work;in 6 weeks  Patient will sit: 30 minutes;for driving;for eating;with no pain;with less difficultty;in 6 weeks  Pt will: demonstrate decreased TTP of L>R lumbar paraspinals and QL with increased tissue extensibility by 6 weeks.    Patient's expectations/goals are realistic.    Barriers to Learning or Achieving Goals:  No Barriers.  Non- adherence to the home exercise program.       Plan / Patient Instructions:        Plan of  Care:   Communication with: Referral Source  Patient Related Instruction: Nature of Condition;Treatment plan and rationale;Self Care instruction;Basis of treatment;Body mechanics;Posture;Expected outcome;Next steps  Times per Week: 1-2  Number of Weeks: 6  Number of Visits: 12  Therapeutic Exercise: ROM;Stretching;Strengthening  Neuromuscular Reeducation: kinesio tape;posture;balance/proprioception;core  Manual Therapy: soft tissue mobilization;myofascial release;joint mobilization;muscle energy  Modalities: TENS;traction  Gait Training: as needed    POC and pathology of condition were reviewed with patient.  Pt. is in agreement with the Plan of Care  A Home Exercise Program (HEP) was initiated today.  Pt. was instructed in exercises by PT and patient was given a handout with detailed instructions.    Plan for next visit: supine bridge with hip adduction, hip abd/ext/add, lat stretch, TrA activation and supine marching, dead bugs or bird dogs     Subjective:       History of Present Illness:    Regina is a 27 y.o. female who presents to therapy today with complaints of bottom right sided back pain and has pain when turning and sitting for longer periods of time. Date of onset is about a month ago and onset was sudden. Symptoms are constant and getting better. Patient reports MRI from a few months ago showed nothing in her lumbar spine, she understands it might be more muscular in nature. She denies history of similar symptoms. She describes their previous level of function as not limited    Pain Ratin  Pain rating at best: 5  Pain rating at worst: 10  Pain description: aching and weakness in the back    Functional limitations are described as occurring with:   lifting  performing routine daily activities  sitting for about 30 mintues  twisting or turning trunk    Patient reports benefit from:  pain medication, cold         Objective:      Note: Items left blank indicates the item was not performed or not indicated  at the time of the evaluation.    Patient Outcome Measures :    Modified Oswestry Low Back Pain Disablity Questionnaire  in %: 42   Scores range from 0-100%, where a score of 0% represents minimal pain and maximal function. The minimal clinically important difference is a score reduction of 12%.    Examination  1. Lumbar spine painful on movement     2. Thoracolumbar back pain     3. Myofascial muscle pain       Involved side: Right and Bilateral  Posture Observation:      General sitting posture is  normal.  General standing posture is normal.    Lumbar ROM:   Date: 8/24/2017     *Indicate scale AROM AROM AROM   Lumbar Flexion WFL      Lumbar Extension Limited with increased P      Right Left Right Left Right Left   Lumbar Sidebending WFL P WFL P       Lumbar Rotation INC P INC P       Thoracic Flexion      Thoracic Extension      Thoracic Sidebending         Thoracic Rotation           Lower Extremity Strength: WFL     Lumbar Special Tests:    Lumbar Special Tests Right Left SI Tests Right  Left   Quadrant test   SI Compression     Straight leg raise - - SI Distraction     Crossover response - - POSH Test     Slump - - Sacral Thrust - -   Sit-up test  FADIR - -   Trunk extensor endurance test  Resisted Abduction     Prone instability test  Other:     Pubic shotgun  Other:       Repeated Motion Testing:  Peripheralizes with Extension    Passive Mobility - Joint Integrity:  WNL but sensitive around the joint    Palpation: TTP of lumbar paraspinals L>R and QL  Flexibility: decreased of hamstrings and glute med but not TTP  Tissue Extensibility: decreased of QL, multifidus, lumbar paraspinals leading to difficulty with functional movement      Treatment Today       Patient Education: Patient educated on plan of care, prognosis, PT/patient role and HEP. Patient educated on impairments related to condition and reproduction of symptoms. Patient instructed to focus on the small goals and this may be a long process to  recovery, and that exercises at home are just as important as coming to therapy. Patient educated on importance of consistency with exercise and therapy in order to see change and progress. Patient was educated on kinesiotape, it's use and relation to her condition; patient was given information sheet about it. Patient demonstrated and verbalized understanding.     Manual Therapy:  STM to bilateral lumbar paraspinals, QL and multifidus in prone- increased tenderness L>R that decreased with treatment     Exercises:  Exercise #1: supine piriformis - hold 30 x 2  Comment #1: knee rocks - perform 20 reps in the morning  Exercise #2: Open books bilaterally - 10 reps each  Comment #2: seated hamstring stretch - hold 30 sec x 2  Exercise #3: seated or standing sidebending with lumbar flexion bias - hold 20 sec x 2        TREATMENT MINUTES COMMENTS   Evaluation 20    Self-care/ Home management     Manual therapy 15 STM to bilateral lumbar paraspinals, QL and multifidus in prone- increased tenderness L>R that decreased with treatment    Neuromuscular Re-education     Therapeutic Activity     Therapeutic Exercises 10 See flowsheet   Gait training     Modality__________________                Total 45    Blank areas are intentional and mean the treatment did not include these items.     PT Evaluation Code: (Please list factors)  Patient History/Comorbidities: back pain upon spinal movement  Examination: decreased flexibility, tissue extensibility, with TTP and pain upon movement, poor core stability and postural awareness  Clinical Presentation: stable and uncomplicated  Clinical Decision Making: low    Patient History/  Comorbidities Examination  (body structures and functions, activity limitations, and/or participation restrictions) Clinical Presentation Clinical Decision Making (Complexity)   No documented Comorbidities or personal factors 1-2 Elements Stable and/or uncomplicated Low   1-2 documented comorbidities or  personal factor 3 Elements Evolving clinical presentation with changing characteristics Moderate   3-4 documented comorbidities or personal factors 4 or more Unstable and unpredictable High                Brenda Meeks, PT  8/24/2017  12:02 PM

## 2021-06-12 NOTE — PROGRESS NOTES
Progress Note    Reason for Visit:  Chief Complaint     Pituitary Problem          Progress Note:    HPI:    This patient is seen in consultation at the request of the primary care physician because of pituitary tumor.  Thank you for referring this pleasant 26-year-old female patient who for 6 months has been having symptoms of headache I pain more on the left than the right nausea breast tenderness and discharge milky discharge with is spontaneous blurring of vision dizziness fatigue no appetite weight loss of 7 pounds.    The patient has 2 children that are 3 and 4-1/2 years old she is not currently nursing.    She had lip lab test done TSH ranges from 3.65-0.44 serum prolactin 22.120 down to 10 cortisol 3.7 was less than 1 as well.    Visual fields are intact by confrontation.      Component      Latest Ref Rng & Units 10/12/2016 5/18/2017 5/21/2017 5/25/2017   Sodium      136 - 145 mmol/L  138  141   Potassium      3.5 - 5.0 mmol/L  4.5  3.2 (L)   Chloride      98 - 107 mmol/L  104  101   CO2      22 - 31 mmol/L  25  28   Anion Gap, Calculation      5 - 18 mmol/L  9  12   Glucose      70 - 125 mg/dL  93     BUN      8 - 22 mg/dL  16     Creatinine      0.60 - 1.10 mg/dL  0.74  0.80   GFR MDRD Af Amer      >60 mL/min/1.73m2  >60  >60   GFR MDRD Non Af Amer      >60 mL/min/1.73m2  >60  >60   Bilirubin, Total      0.0 - 1.0 mg/dL  1.1 (H)     Calcium      8.5 - 10.5 mg/dL  9.1  9.1   Protein, Total      6.0 - 8.0 g/dL  7.1     ALBUMIN      3.5 - 5.0 g/dL  4.1     Alkaline Phosphatase      45 - 120 U/L  46     AST      0 - 40 U/L  13     ALT      0 - 45 U/L  11     Free T4      0.7 - 1.8 ng/dL   0.8    TSH      0.30 - 5.00 uIU/mL 3.65  0.44    Vitamin D, Total (25-Hydroxy)      30.0 - 80.0 ng/mL 18.4 (L)   21.4 (L)   FSH      mIU/mL       Prolactin      0.0 - 20.0 ng/mL   22.1 (H)    Cortisol      ug/dL   <1.0 3.7   Adrenocorticotropic Hormone      pg/mL    29     Component      Latest Ref Rng & Units 6/27/2017    Sodium      136 - 145 mmol/L 138   Potassium      3.5 - 5.0 mmol/L 4.2   Chloride      98 - 107 mmol/L 102   CO2      22 - 31 mmol/L 28   Anion Gap, Calculation      5 - 18 mmol/L 8   Glucose      70 - 125 mg/dL 87   BUN      8 - 22 mg/dL 12   Creatinine      0.60 - 1.10 mg/dL 0.75   GFR MDRD Af Amer      >60 mL/min/1.73m2 >60   GFR MDRD Non Af Amer      >60 mL/min/1.73m2 >60   Bilirubin, Total      0.0 - 1.0 mg/dL 1.5 (H)   Calcium      8.5 - 10.5 mg/dL 8.9   Protein, Total      6.0 - 8.0 g/dL 7.5   ALBUMIN      3.5 - 5.0 g/dL 4.2   Alkaline Phosphatase      45 - 120 U/L 50   AST      0 - 40 U/L 13   ALT      0 - 45 U/L 9   Free T4      0.7 - 1.8 ng/dL    TSH      0.30 - 5.00 uIU/mL 2.41   Vitamin D, Total (25-Hydroxy)      30.0 - 80.0 ng/mL 19.9 (L)   FSH      mIU/mL 3.1   Prolactin      0.0 - 20.0 ng/mL 10.0   Cortisol      ug/dL    Adrenocorticotropic Hormone      pg/mL        INDICATION: Headache, sudden, unilateral, ipsilat Paolo syndrome or carotid/vertebral dissection suspected.  TECHNIQUE: Head MRI without and with intravenous contrast.  CONTRAST: 7 mL Gadavist.  COMPARISON: 5/17/2017     FINDINGS: No acute infarct/restricted diffusion. No mass lesion, hemorrhage, or extraaxial fluid collections. The ventricles and sulci are normal in size, shape, and position. The cerebellum is unremarkable. No signal abnormality in the brain parenchyma.   More clearly visualized on the current examination, there is an ovoid T2 hyperintense nonenhancing lesion in the left parasagittal posterior pituitary gland measuring approximately 4.8 x 3.7 mm (craniocaudad by AP) x 6.5 mm ML. No evidence of cavernous   sinus extension. Craniocaudad. No abnormal intracranial enhancement or mass effect. The major intracranial vascular flow voids are maintained. The orbits are unremarkable. The calvarium and skull base are unremarkable. The paranasal sinuses are clear.   The mastoid air cells are clear.      IMPRESSION:    CONCLUSION:  1.  No restricted diffusion/acute infarct, space-occupying hemorrhage, or intracranial mass.  2.  4.8 x 3.7 x 6.5-mm T2 hyperintense hypoenhancing lesion within the posterior left parasagittal pituitary gland. Differential considerations include degenerative adenoma or Rathke cleft cyst. Follow up is suggested. No extension into the cavernous   sinuses.      Review of Systems:    Nervous System: No headache, dizziness, fainting or memory loss. No tingling sensation of hand or feet.  Ears: No hearing loss or ringing in the ears  Eyes: No blurring of vision, redness, itching or dryness.  Nose: No nosebleed or loss of smell  Mouth: No mouth sores or loss of taste  Throat: No hoarseness or difficulty swallowing  Neck: No enlarged thyroid or lymph nodes.  Heart: No chest pain, palpitation or irregular heartbeat. No swelling of hands or feet  Lungs: No shortness of breath, cough, night sweats, wheezing or hemoptysis.  Gastrointestinal: No nausea or vomiting, constipation or diarrhea.  No acid reflux, abdominal pain or blood in stools.  Kidney/Bladdr: No polyuria, polydipsia, nocturia or hematuria.  Genital/Sexual: No loss of libido  Skin: No rash, hair loss or hirsutism.  No abnormal striae  Muscles/Joints/Bones: No morning stiffness, muscle aches and pain or loss of height.    Current Medications:  Current Outpatient Prescriptions   Medication Sig     potassium chloride SA (K-DUR,KLOR-CON) 20 MEQ tablet Take 1 tablet (20 mEq total) by mouth daily.       Patients Active Problems:  Patient Active Problem List   Diagnosis     Anxiety     Tachycardia     Thyroiditis     Hair loss     Positional headache     Hearing loss     Headache     Intractable headache     Pituitary disease       History:   reports that she has never smoked. She has never used smokeless tobacco. She reports that she does not drink alcohol or use illicit drugs.   reports that she has never smoked. She has never used smokeless tobacco. She  "reports that she does not drink alcohol or use illicit drugs.  History   Smoking Status     Never Smoker   Smokeless Tobacco     Never Used      reports that she has never smoked. She has never used smokeless tobacco. She reports that she does not drink alcohol or use illicit drugs.  History   Sexual Activity     Sexual activity: Yes     Partners: Male     Past Medical History:   Diagnosis Date     Anemia      Anxiety disorder     anxiety     Disease of thyroid gland     hyperthyroidism     Thyroiditis      Family History   Problem Relation Age of Onset     Heart disease Father      a. fib     Heart attack Mother 44     Past Medical History:   Diagnosis Date     Anemia      Anxiety disorder     anxiety     Disease of thyroid gland     hyperthyroidism     Thyroiditis      Past Surgical History:   Procedure Laterality Date     APPENDECTOMY         Vitals   height is 5' 4.5\" (1.638 m) and weight is 131 lb 3.2 oz (59.5 kg). Her blood pressure is 102/52.         Exam  General appearance: The patient looked well, not in acute distress.  Eyes: no evidence of thyroid eye disease.   Retinal exam: No evidence of diabetic retinopathy.  Mouth and Throat: Normal  Neck: No evidence of thyromegaly, enlarged lymph node or tenderness  Chest: Trachea is central. Chest is clear to auscultation and percussion. Breat sounds are normal.  Cardiovascular exam: JVP is not raised. Heart sounds are normal, no murmurs or rub  Peripheral pulses are palpable.   Abdomen: No masses or tenderness.    Back: No vertebral tenderness or kyphosis.  Extremities: No evidence of leg edema.   Skin: Normal to touch.  No abnormal striae  Neurologic exam:  Visual fields are intact by confrontation, grossly intact. No evidence of peripheral neuropathy.  Detailed foot exam normal.        Diagnosis:  No diagnosis found.    Orders:   No orders of the defined types were placed in this encounter.        Assessment and Plan:  Pituitary microadenoma the patient had an " MRI which showed that she has 6.5 mm pituitary microadenoma I discussed the pathophysiology of the disease with the patient I told that this is a benign process to start her on Dostinex 0.25 mg once a week at bedtime if tolerated she can go up to twice a week.    Her menstrual cycles are becoming more frequent.    I will check her thyroid function and the pituitary function in details if the ACTH is high serum prolactin is low we may do ACTH stimulation test.  Especially with the weight loss and the nausea and the fatigue.    Vitamin D deficiency advised to take 2000 units a day.    We will do labs today she will return to in 6 month I did spend 60 minutes with the patient more than 50% was spent on counseling and managing her care.

## 2021-06-12 NOTE — PROGRESS NOTES
ASSESSMENT: Regina Nguyen is a 27 y.o. female with past medical history significant for anxiety who presents today for new patient evaluation of a 1 month history of right back pain at the thoracolumbar junction without radicular symptoms.  A CT of the abdomen and pelvis did not show any abnormality to account for her pain.  Her pain seems largely muscular in nature.  She may have some mild facet arthropathy contributing to the pain.  She did have reproduction of her pain with lumbar extension and facet loading maneuvers on exam.  She was neurologically intact.    ALEXIS: 52  Who 5: 13    PLAN:  A shared decision making model was used.  The patient's values and choices were respected.  The following represents what was discussed and decided upon by the physician assistant and the patient.      1.  DIAGNOSTIC TESTS: I reviewed the CT abdomen and pelvis from August 14.  I also reviewed the MRI scans of the thoracic and lumbar spine from May 2017.  If her symptoms fail to improve, we could consider obtaining a updated MRI scan of the thoracic and/or lumbar spine for further evaluation.    2.  PHYSICAL THERAPY:  Discussed the importance of core strengthening, ROM, stretching exercises with the patient and how each of these entities is important in decreasing pain.  Explained to the patient that the purpose of physical therapy is to teach the patient a home exercise program.  These exercises need to be performed every day in order to decrease pain and prevent future occurrences of pain.  I placed an order for the patient to begin physical therapy at the [facility] rehab.    3.  MEDICATIONS:    -Methocarbamol 500-to 1000 mg 3 times daily as needed was prescribed.  Asked her to use this in case of Flexeril.  Flexeril has been helpful, but it makes her quite drowsy which limits her use of it.  -The patient can continue using naproxen as needed.    4.  INTERVENTIONS: No interventions were ordered.  I am  optimistic that the patient's pain will improve with conservative treatment.    5.  PATIENT EDUCATION: I reassured the patient that her pain seems musculoskeletal in nature and should improve with physical therapy.  I encouraged her to continue using ice.  We discussed that if she does not improve with physical therapy, we could also consider spinal manipulation.    -The patient was in agreement the above plan.  All questions were answered.    6.  FOLLOW-UP:   I will see the patient back in clinic in about 3 weeks to follow-up.  If she has any questions or concerns in the meantime, she should not hesitate contact our clinic.      SUBJECTIVE:  Regina Nguyen  Is a 27 y.o. female who presents today as an emergency department referral for new patient evaluation of low back pain.  The patient states that she began to have right-sided lower back pain about 1 month ago.  The patient states that she woke up with the pain.  She denies any injury or event to cause the pain.  Patient states her pain is been getting progressively more severe and 2 days ago she was unable to get out of bed in the morning so she went to the emergency department.  The emergency department performed a CT of the abdomen and pelvis which was normal and discharged her with prescriptions for naproxen and Flexeril and recommended that she follow-up here.  The patient states that the medications that were prescribed to her have been providing some relief of her pain.    The patient complains of right-sided lower back pain.  This is located at about the thoracolumbar junction.  The pain occasionally extends up the right side of the back to the parascapular region.  She denies any pain wrapping around the trunk.  She denies any pain radiating into the buttock or down the leg.  She denies any left-sided pain.  The patient's pain is aggravated with twisting, bending, lifting, prolonged sitting, and driving.  Her pain is alleviated with laying flat on  her back.  She describes the pain as intermittently burning, dull, sharp, and throbbing.  The patient states that her back feels weak.  She notices this most when she tries to  her young children.  She denies any weakness down the legs.  The patient states that she had one episode of bilateral lower extremity numbness and tingling several days ago which resolved spontaneously and has not recurred.  She denies any loss of bowel or bladder control.  She denies any dysuria.  She denies any diarrhea or constipation.  She denies vomiting.  She has had some nausea when the pain is severe.  She also felt short of breath when her pain was severe.  She denies any recent fevers, chills, or sweats.    The patient has not had any treatments for her lower back.  She has never had physical therapy, chiropractic treatment, spine injections, or spine surgery.  She is currently using cyclobenzaprine 10 mg twice daily.  This helps but is making her quite drowsy.  She is also using naproxen 250 mg about once per day.    Current Outpatient Prescriptions on File Prior to Encounter   Medication Sig Dispense Refill     cyclobenzaprine (FLEXERIL) 10 MG tablet Take 1 tablet (10 mg total) by mouth 2 (two) times a day as needed for muscle spasms. 12 tablet 0     naproxen (NAPROSYN) 250 MG tablet Take 1 tablet (250 mg total) by mouth 2 (two) times a day with meals for 7 days. 14 tablet 0     potassium chloride SA (K-DUR,KLOR-CON) 20 MEQ tablet Take 20 mEq by mouth every evening.         No Known Allergies    Past Medical History:   Diagnosis Date     Anemia      Anxiety disorder     anxiety     Disease of thyroid gland     hyperthyroidism     Thyroiditis       Patient Active Problem List   Diagnosis     Anxiety     Tachycardia     Thyroiditis     Hair loss     Positional headache     Hearing loss     Headache     Intractable headache     Pituitary disease         Past Surgical History:   Procedure Laterality Date     APPENDECTOMY          Family History   Problem Relation Age of Onset     Heart disease Father      radha. fib     Heart attack Mother 44     Social history: The patient is .  She works as a  for a area she has 2 children aged 4 and 2.  She is a former college cross-country runner and sprinter.  She denies tobacco, alcohol, or illicit drug use.    ROS: Positive for changes in vision, irregular heartbeat, chest pain/pressure, shortness of breath, poor sleep quality, back pain, leg pain, headache.  Specifically negative for bowel/bladder dysfunction, fevers,chills, appetite changes, unexplained weight loss.   Otherwise 13 systems reviewed are negative.  Please see the patient's intake questionnaire from today for details.      OBJECTIVE:  PHYSICAL EXAMINATION:    CONSTITUTIONAL:  Vital signs as above.  No acute distress.  The patient is well nourished and well groomed.  PSYCHIATRIC:  The patient is awake, alert, oriented to person, place, time and answering questions appropriately with clear speech.    HEENT: Normocephalic, atraumatic.  Sclera clear.  Neck is supple.  SKIN:  Skin over the face, bilateral lower extremities, and posterior torso is clean, dry, intact without rashes.    GAIT:  Gait is non-antalgic.  The patient is able to heel and toe walk without significant difficulty.    STANDING EXAMINATION: No significant tenderness palpation over the thoracic and lumbar paraspinous muscles.  There is some hypertonicity over the paraspinous musculature at the thoracolumbar junction on the right.  Lumbar flexion is intact.  Lumbar extension is restricted due to pain.  Lumbar facet loading maneuvers did reproduce right-sided pain.  MUSCLE STRENGTH:  The patient has 5/5 strength for the bilateral hip flexors, knee flexors/extensors, ankle dorsiflexors/plantar flexors, great toe extensors, ankle evertors/invertors.  NEUROLOGICAL:  2/4 patellar, and achilles reflexes bilaterally.  Negative Babinski's bilaterally.  No ankle clonus  bilaterally. Sensation to light touch is intact in the bilateral L4, L5, and S1 dermatomes.  VASCULAR:  2/4 dorsalis pedis pulses bilaterally.  Bilateral lower extremities are warm.  There is no pitting edema of the bilateral lower extremities.  ABDOMINAL:  Soft, non-distended, non-tender throughout all quadrants.  No pulsatile mass palpated in the left lower quadrant.  LYMPH NODES:  No palpable or tender inguinal lymph nodes.  MUSCULOSKELETAL: Straight leg raise is negative bilaterally.  Fly's test is negative bilaterally.    RESULTS:    I reviewed the CT abdomen and pelvis from [facility] dated August 14, 2017.  This is negative.  No etiology for patient's symptoms identified.    MRI lumbar spine from May 18, 2017 was reviewed.  This shows no significant degenerative change, spinal canal or foraminal stenosis.  There is no abnormal enhancement or paraspinous fluid collection.    MRI of the thoracic spine from [facility] dated May 18, 2017 was reviewed.  There is no significant degenerative change, spinal canal, or foraminal stenosis.  No abnormal cord signal enhancement or paraspinous fluid collection.  Please see report for details.

## 2021-06-14 NOTE — PROGRESS NOTES
"Assessment:      Healthy female exam.    Pelvic pain  Anxiety  Pituitary disease  H/O hyperthyroidism     Plan:      1. Discussed nutrition and exercise.  2.  Offered and recommended: gc/ct, wet prep and pap.  Declined gc/ct and accepted others.  Encouraged to call or send a message to [facility] clinic(for an order) If she changes her mind and opts for gc/ct   3.  Consider UA and pregnancy test.  Pt declined as there were obtained (and negative 11/11)  4.  Discussed and offered a pelvic ultrasound.  Patient declines at this time though may call for an order if she changes her mind.  Consider referral to OB-GYN.  Patient declines at this time  5. Blood tests: declines all HM /screening labs.  Encouraged her to call for an order should she desire these labs in the future.  Discussed the value of lipids considering her family hx.  (mother with MI @ age 44)  6. Breast self exam technique reviewed and patient encouraged to perform self-exam monthly.   7. Contraception: condoms   8.  Next pap due 2020. HPV co-testing discussed with that pap, then paps q 5 yrs if both negative.  9.  Flu vaccine offered.  Patient declined  10.  Continue Prozac.  Discussed option of therapy.  Patient declines  11.  [facility] reviewed imaging results from 7/13/17 (CT of abd/pelvis left ovarian cyst 2.5cm), 8/14/17 (CT abd/pelvis negative), 11/11/17 (US abd-negative) in addition to office visit/ED notes from 6/27/17, 7/13/17, 8/3/17, 8/14/17, and 11/11/17  12.  RTC in 1 year or prn    Subjective:      Regina Nguyen is a 27 y.o. female who presents for an annual exam and to discuss ongoing pelvic pain.  Regina has been experiencing abdominal/pelvic pain since about the beginning of this year.  She states the pain has worsened in the last month.  Points to her lower pelvis over her ovaries.  She describes the pain as \"burning\" and \"stabbing\".  R>L.  The pain occurs daily and worsens around ovulation.  She has noticed a decrease in appetite this month.  " "Hot baths improve symptoms while movement increases the pain.  Ibuprofen can provide some relief.  Of note, intercourse has \"always been painful\" internally.  She has previously sought care for these symptoms from various providers.  She receives regular care from her PCP and endocrinologist for her various health conditions.    She states she and her  have completed their family.  They currently use condoms and that's ok for now.  May consider something more permanent in the future.      Healthy Habits:   Regular Exercise: Yes --at work as a   Sunscreen Use: Yes  Healthy Diet: Yes  Dental Visits Regularly: Yes  Seat Belt: Yes  Sexually active: Yes  STI risk No  domestic violence No    Self Breast Exam Monthly:Yes  Colonoscopy: No  Lipid Profile: No, declined  Glucose Screen: No, declined  Prevention of Osteoporosis: Yes  Last Dexa: N/A      Immunization History   Administered Date(s) Administered     DT (pediatric) 2005     HPV Quadrivalent 10/06/2010     Hep B, historic 2003, 2003, 2004     Influenza, Live, Nasal LAIV3 10/06/2010     Tdap 10/06/2010     Immunization status: documented.    Gynecologic History  Patient's last menstrual period was 10/16/2017 (within days).  \"Due soon\"  Negative  upt 17 at ED visit  Contraception: condoms    Cancer screening:   Last Pap: 2014. Results were: ASCUS/-hpv  Last mammogram: . Results were: normal      OB History    Para Term  AB Living   2 2 2 0 0 2   SAB TAB Ectopic Multiple Live Births   0 0 0 0 1      # Outcome Date GA Lbr Lito/2nd Weight Sex Delivery Anes PTL Lv   2 Term 14 39w1d 05:53 / 00:03 9 lb 0.6 oz (4.099 kg) M Vag-Spont None N FABIOLA      Complications: True knot in cord   1 Term                   Current Outpatient Prescriptions   Medication Sig Dispense Refill     FLUoxetine (PROZAC) 20 MG capsule Take 1 capsule (20 mg total) by mouth daily. 90 capsule 1     potassium chloride SA " "(K-DUR,KLOR-CON) 20 MEQ tablet Take 20 mEq by mouth every evening.       No current facility-administered medications for this visit.      Past Medical History:   Diagnosis Date     Anemia      Anxiety disorder     anxiety     Disease of thyroid gland     hyperthyroidism; postpartum     Thyroiditis      Past Surgical History:   Procedure Laterality Date     APPENDECTOMY      7th grade     Review of patient's allergies indicates no known allergies.  Family History   Problem Relation Age of Onset     Heart disease Father      a. fib     Heart disease Mother      Heart attack Mother 44     a second at 50+     No Medical Problems Brother      No Medical Problems Daughter      No Medical Problems Son      Heart disease Maternal Grandmother      Heart disease Maternal Grandfather      No Medical Problems Brother      Social History     Social History     Marital status:      Spouse name: N/A     Number of children: N/A     Years of education: N/A     Occupational History     Not on file.     Social History Main Topics     Smoking status: Never Smoker     Smokeless tobacco: Never Used     Alcohol use Yes      Comment: every  so often; 2-3 drinks per week     Drug use: No     Sexual activity: Yes     Partners: Male     Birth control/ protection: Condom     Other Topics Concern     Not on file     Social History Narrative       Review of Systems  General:  Denies problem  Eyes: Denies problem  Ears/Nose/Throat: Denies problem  Cardiovascular: Denies problem  Respiratory:  Denies problem  Gastrointestinal:  denies problems  Genitourinary: Constant pelvic pain  Musculoskeletal:  Denies problem  Skin: Denies problem  Neurologic:denies problems  Psychiatric: anxiety symptoms. Declines therapy.  Denies SI/HI  Endocrine: denies problems        Objective:         Vitals:    11/15/17 0758   BP: 100/64   Pulse: 62   Resp: 16   Weight: 123 lb (55.8 kg)   Height: 5' 5\" (1.651 m)       Physical Exam:  General Appearance: Alert, " cooperative, no distress, appears stated age  Skin: Skin color, texture, turgor normal, no rashes or lesions  Throat: Lips, mucosa, and tongue normal; teeth and gums normal  HEENT: grossly normal; otoscopic and opthalmic exam not performed.   Neck: Supple, symmetrical, trachea midline, no adenopathy;  thyroid: not enlarged, symmetric, no tenderness/mass/nodules  Lungs: Clear to auscultation bilaterally, respirations unlabored  Breasts: No breast masses, tenderness, asymmetry, or nipple discharge.  Heart: Regular rate and rhythm, S1 and S2 normal, no murmur, rub, or gallop  Abdomen: Soft, non-tender. No organomegaly or masses  Pelvic:External genitalia normal without lesions or irritation. Vagina and cervix show no lesions, inflammation, discharge or tenderness. Ectropion notes at cervical os.  No cystocele, No rectocele. Uterus & adnexal normal without masses or tenderness.     Medical/Surgical/Social/OB/Family history reviewed and updated in the medical record  TT spent 40 minutes with >50% spent CCC  SONIA Mooney, LATANYA

## 2021-06-15 PROBLEM — R51.9 HEADACHE: Status: ACTIVE | Noted: 2017-05-18

## 2021-06-15 PROBLEM — E23.7: Status: ACTIVE | Noted: 2017-05-22

## 2021-06-15 PROBLEM — R51.0 POSITIONAL HEADACHE: Status: ACTIVE | Noted: 2017-05-17

## 2021-06-15 PROBLEM — R51.9 INTRACTABLE HEADACHE: Status: ACTIVE | Noted: 2017-05-19

## 2021-06-15 PROBLEM — H91.90 HEARING LOSS: Status: ACTIVE | Noted: 2017-05-17

## 2021-06-15 NOTE — PROGRESS NOTES
Assessment and Plan:     1. Acute pain of right knee  XR Knee Right 1 or 2 VWS    MR Knee Without Contrast Right    Ambulatory referral to Orthopedics   2. Instability of right knee joint       X-ray shows no acute fracture.  Will have radiology review.  Differentials include ligament injury, meniscus injury, tendinitis, bursitis.  Due to instability and worsening pain, will obtain MRI of the knee for further evaluation to rule out torn ligament or meniscus.  Will refer to orthopedics for further evaluation.  Discussed symptomatic treatment including rest, ice, elevation, NSAIDs.  If symptoms persist or worsen, suggest follow-up with Dr. Neves.  She is content with the plan.    Subjective:     Regina is a 30 y.o. female presenting to the clinic for concerns for right knee injury.  Patient was bending over to  an object yesterday when she felt a pop within her right knee.  She had instant pain.  The knee has been swollen.  She has not noticed any redness or bruising.  She describes her pain as a constant throb.  Pain is exacerbated when she sits or walks upstairs.  She feels as though nothing assists the pain.  She has been applying heat and ice.  She denies any injury in the past.    Review of Systems: A complete 14 point review of systems was obtained and is negative or as stated in the history of present illness.    Social History     Socioeconomic History     Marital status:      Spouse name: Not on file     Number of children: Not on file     Years of education: Not on file     Highest education level: Not on file   Occupational History     Not on file   Social Needs     Financial resource strain: Not on file     Food insecurity     Worry: Not on file     Inability: Not on file     Transportation needs     Medical: Not on file     Non-medical: Not on file   Tobacco Use     Smoking status: Never Smoker     Smokeless tobacco: Never Used   Substance and Sexual Activity     Alcohol use: Yes      Comment: every  so often; 2-3 drinks per week     Drug use: No     Sexual activity: Yes     Partners: Male     Birth control/protection: Condom   Lifestyle     Physical activity     Days per week: Not on file     Minutes per session: Not on file     Stress: Not on file   Relationships     Social connections     Talks on phone: Not on file     Gets together: Not on file     Attends Yarsani service: Not on file     Active member of club or organization: Not on file     Attends meetings of clubs or organizations: Not on file     Relationship status: Not on file     Intimate partner violence     Fear of current or ex partner: Not on file     Emotionally abused: Not on file     Physically abused: Not on file     Forced sexual activity: Not on file   Other Topics Concern     Not on file   Social History Narrative     Not on file       Active Ambulatory Problems     Diagnosis Date Noted     Anxiety      Tachycardia 2015     Thyroiditis 2015     Hair loss 2015     Positional headache 2017     Hearing loss 2017     Headache 2017     Intractable headache 2017     Pituitary disease (H) 2017     Resolved Ambulatory Problems     Diagnosis Date Noted     Headache      Amenorrhea      Iron deficiency anemia secondary to inadequate dietary iron intake      Active labor at term 2014      (normal spontaneous vaginal delivery) 2014     Past Medical History:   Diagnosis Date     Anemia      Anxiety disorder      Disease of thyroid gland        Family History   Problem Relation Age of Onset     Heart disease Father         a. fib     Heart disease Mother      Heart attack Mother 44        a second at 50+     No Medical Problems Brother      No Medical Problems Daughter      No Medical Problems Son      Heart disease Maternal Grandmother      Heart disease Maternal Grandfather      No Medical Problems Brother        Objective:     /70 (Patient Site: Left Arm, Patient  Position: Sitting, Cuff Size: Adult Regular)   Pulse 78   Wt 139 lb 9.6 oz (63.3 kg)   SpO2 98%   BMI 23.23 kg/m      Patient is alert, in no obvious distress.   Skin: Warm, dry.   Lungs:  Clear to auscultation. Respirations even and unlabored.  No wheezing or rales noted.   Heart:  Regular rate and rhythm.  No murmurs.   Musculoskeletal: She ambulates with a limp.  She has limited range of motion of the knee.  She is able to flex it approximately 50 degrees.  Valgus, varus, Lachman's, Monica's are all negative.  She is tender to palpation the superior aspect of the knee just above the patella.    LABORATORY: I ordered and personally reviewed right knee x-rays showing no obvious fracture.  Will have radiology review.

## 2021-06-15 NOTE — PROGRESS NOTES
Assessment/Plan:     Patient presents with symptoms of an upper respiratory tract infection as well as a sore throat.  Her strep swab was negative.  Her right tympanic membrane was erythematous and she was diagnosed with right otitis media.  Discussed that this could still be caused from a viral infection, and the patient did not necessarily need to treat this.  Ordered amoxicillin ×10 days if patient does choose to treat an ear infection.  Handout on symptomatic treatment for sore throat given to patient.    Problem List Items Addressed This Visit     None      Visit Diagnoses     Sore throat    -  Primary    Relevant Orders    Rapid Strep A Screen-Throat    Right otitis media              Return to clinic PRN.    Total time spent with patient was 10 minutes with greater than 50% spent in face-to-face counseling regarding the above plan.    Subjective:      Regina Nguyen is a 27 y.o. female who presents to clinic for sore throat.    Patient has been experiencing symptoms for 6 days. It is worsening.  Patient endorses:  sore throat, fatigue, muscle aches, and dry cough, bilateral ear pain but right is greater than left, chills, chest pain after coughing, headache  Patient denies: chest pain, fever, ear discharge, rash, rhinorrhea, congestion, post-nasal drip, sinus pressure  Treatment thus far has consisted of theraflu, vicks and water.  Sick contacts denied.  Recent travel to [place].    Past Medical History, Family History, and Social History reviewed.     Current Outpatient Prescriptions on File Prior to Visit   Medication Sig Dispense Refill     FLUoxetine (PROZAC) 20 MG capsule Take 1 capsule (20 mg total) by mouth daily. 90 capsule 1     potassium chloride SA (K-DUR,KLOR-CON) 20 MEQ tablet Take 20 mEq by mouth every evening.       No current facility-administered medications on file prior to visit.        Review of systems is as stated in HPI.  The remainder of the 10 system review is otherwise  "negative.    Objective:     /60  Pulse (!) 102  Temp 98.1  F (36.7  C) (Oral)   Ht 5' 5\" (1.651 m)  Wt 122 lb (55.3 kg)  SpO2 100%  BMI 20.3 kg/m2 Body mass index is 20.3 kg/(m^2).    Gen: Alert, NAD, appears stated age, normal hygiene   Eyes: conjunctivae without injection, sclera clear, EOMI  ENT/mouth: nares clear, septum midline, absent rhinorrhea, mild pharyngeal injection, neck is supple, no thyroid enlargement, left tympanic membrane unremarkable, right tympanic membrane is erythematous  CV: RRR, no murmur appreciated, pedal edema absent bilaterally  Resp: CTAB, no wheezes, rales or ronchi  ABD: non-tender to palpation, nondistended  MSK: grossly full range of motion in all joints, no obvious deformity  Neuro: CN II-XII grossly intact, no deficits in coordination  Psych: no apparent hallucinations or delusions, no pressured speech; alert, oriented x3  SKIN: dry and without lesions  Heme/lymph: no pallor, no active bleeding/bruising, bilateral submandibular adenopathy appreciated    This note has been dictated using voice recognition software. Any grammatical or context distortions are unintentional and inherent to the the software.     Anne Feliz MD  [facility]      "

## 2021-06-15 NOTE — TELEPHONE ENCOUNTER
Reason contacted:  Orders request  Information relayed:    Spoke with patient who tried calling radiology to schedule an MRI of her right knee but the order has not been entered yet.    Please place order ASAP and contact patient at [phone] to notify her this order was placed. It is ok to leave a detailed message if she is unavailable.   Additional questions:  No  Further follow-up needed:  Yes  Okay to leave a detailed message:  Yes

## 2021-06-16 NOTE — PROGRESS NOTES
Assessment:    1. Right acute suppurative otitis media  cefdinir (OMNICEF) 300 MG capsule   2. Anxiety           Plan:    Discussed persistent concerns with right otitis media.  Omnicef 300 mg twice daily ×10 days was provided.  Fluticasone nasal spray for eustachian tube management also recommended along with guaifenesin 600 mg using 1-2 capsules twice daily.  Continues fluoxetine 20 mg daily with YARELY 7 questionnaire 0 out of 21 and PHQ 9 questionnaire 0 out of 27.  Reviewed history of prior headache question low-pressure headache etiology with subsequent endocrinology evaluation with resolved cortisol, prolactin, etc.        Subjective:    Regina Nguyen is seen today for follow-up evaluation.  Ongoing right ear pain.  Had been seen 2018 with concerns for otitis media.  Amoxicillin 875 mg twice daily ×10 days was prescribed.  Persistent issues.  No drainage.  No fevers.  History of headache with suspected low pressure headache etiology.  Due to abnormal MRI had seen endocrinology subsequently.  Recheck cortisol levels were normal with initial suppression likely secondary to high-dose steroid use.  No vision change.  Underlying history of anxiety.  Feels symptoms stable on fluoxetine 20 mg daily.  Not requiring refill at this time.  History of pituitary microadenoma the patient had an MRI which showed that she has 6.5 mm pituitary microadenoma followed by Dr. Childs.  Comprehensive review of systems as above otherwise all negative.     - Pernell x 6/19/15  1 daughter - Grayson  1 son - Irving  5th year senior (graduated in ) [employer] - [employer] - elementary ed; major - communications   No smoke   No EtOH   Mom - Glenny (Ita) parents    Dad - a. fib  2 bros -   MGD - DM   PGD - bone CA   MGM - melanoma, emphysema ()   PGM - CVA   Mat uncle - bone CA   Surgeries: [facility] ~    Cell #[phone], message OK (08 results)   Pt needs pap done   Patient is on Goal List for  STD Screening.    5/22/17 FYI: Patient came in with a headache, suspected low pressure headache, she was started on high doses of steroids, she also underwent an MRI with __ possible lesion in the pituitary area, she will need to follow up with endocrinology in 2-3 weeks duration, kindly recheck cortisol level once he completes the cortisone course, at this point the cortisone level was low, most likely from suppression due to the high dose of steroid use.        Past Surgical History:   Procedure Laterality Date     APPENDECTOMY      7th grade        Family History   Problem Relation Age of Onset     Heart disease Father      a. fib     Heart disease Mother      Heart attack Mother 44     a second at 50+     No Medical Problems Brother      No Medical Problems Daughter      No Medical Problems Son      Heart disease Maternal Grandmother      Heart disease Maternal Grandfather      No Medical Problems Brother         Past Medical History:   Diagnosis Date     Anemia      Anxiety disorder     anxiety     Disease of thyroid gland     hyperthyroidism; postpartum     Thyroiditis         Social History   Substance Use Topics     Smoking status: Never Smoker     Smokeless tobacco: Never Used     Alcohol use Yes      Comment: every  so often; 2-3 drinks per week        Current Outpatient Prescriptions   Medication Sig Dispense Refill     FLUoxetine (PROZAC) 20 MG capsule Take 1 capsule (20 mg total) by mouth daily. 90 capsule 1     cefdinir (OMNICEF) 300 MG capsule Take 1 capsule (300 mg total) by mouth 2 (two) times a day for 10 days. 20 capsule 0     No current facility-administered medications for this visit.           Objective:    Vitals:    02/16/18 1028   BP: 110/60   Temp: 97.9  F (36.6  C)   Weight: 127 lb (57.6 kg)      Body mass index is 21.13 kg/(m^2).    Alert.  No apparent distress.  HEENT exam with dull erythematous right TM with middle ear effusion and mild external auditory canal erythema without otorrhea  or cerumen impaction.  Left TM and external auditory canal normal.  Mild right tragus discomfort without external pinna discomfort on exam.  Oropharynx normal without postnasal drainage.  Neck supple without significant cervical lymphadenopathy.  Chest clear.  Cardiac exam regular.  Extremities warm and dry.           INDICATION: Headache, sudden, unilateral, ipsilat Paolo syndrome or carotid/vertebral dissection suspected.  TECHNIQUE: Head MRI without and with intravenous contrast.  CONTRAST: 7 mL Gadavist.  COMPARISON: 5/17/2017      FINDINGS: No acute infarct/restricted diffusion. No mass lesion, hemorrhage, or extraaxial fluid collections. The ventricles and sulci are normal in size, shape, and position. The cerebellum is unremarkable. No signal abnormality in the brain parenchyma.   More clearly visualized on the current examination, there is an ovoid T2 hyperintense nonenhancing lesion in the left parasagittal posterior pituitary gland measuring approximately 4.8 x 3.7 mm (craniocaudad by AP) x 6.5 mm ML. No evidence of cavernous   sinus extension. Craniocaudad. No abnormal intracranial enhancement or mass effect. The major intracranial vascular flow voids are maintained. The orbits are unremarkable. The calvarium and skull base are unremarkable. The paranasal sinuses are clear.   The mastoid air cells are clear.       IMPRESSION:   CONCLUSION:  1.  No restricted diffusion/acute infarct, space-occupying hemorrhage, or intracranial mass.  2.  4.8 x 3.7 x 6.5-mm T2 hyperintense hypoenhancing lesion within the posterior left parasagittal pituitary gland. Differential considerations include degenerative adenoma or Rathke cleft cyst. Follow up is suggested. No extension into the cavernous   sinuses.

## 2021-06-16 NOTE — PROGRESS NOTES
Assessment and Plan     30-year-old female with unremarkable past medical history who presents with right-sided neck and shoulder pain going on for the last 3 days.  No inciting incident.  No past medical history of trauma or surgery to the area.  Exam shows significant muscular pain in the right trapezius as well as rotator cuff.  However strength intact.  Some stiffness with spasm also present.  Recommended conservative treatment at this time with NSAIDs/Tylenol, heat, exercises which she was given today for stretching, will prescribe some Flexeril to help with spasm.  She has appointment with PCP in a couple of days and will follow up with him to see how her symptoms are.  Would recommend continue these conservative treatments for the next couple weeks and if not improving then considering further imaging such as  CT cervical spine or an MRI of her shoulder.    1. Muscle spasm  - cyclobenzaprine (FLEXERIL) 10 MG tablet; Take 1 tablet (10 mg total) by mouth 3 (three) times a day as needed for muscle spasms.  Dispense: 20 tablet; Refill: 0    Follow up: PRN  Options for treatment and follow-up care were reviewed with the patient and/or guardian. Regina Nguyen and/or guardian engaged in the decision making process and verbalized understanding of the options discussed and agreed with the final plan.    Dr. Eddie White MD           HPI:   Regina Nguyen is a 30 y.o.  female with problems as below, who presents for:    Chief Complaint   Patient presents with     shoulder blade pain     HPI: Started 3 days ago. Sleeping was especially difficult. Never has had something like this before.  Right Shoulder pain:   Location: Right side behind shoulder blade sharp pain, constant. Laying down makes it much worse, moving neck  Duration: 3 days  Injury? Inciting activity? None- just woke up with pain   Radiation: Front of chest   Numbness/tingling? None   Weakness? Limited due to pain   Instability? None   Clicking/  catching? No   Imaging? None recently,MRI of cervical spine from 2017 copied below   Treatment? Tylenol and ibuprofen, icing     [facility]  MR CERVICAL SPINE W WO CONTRAST  5/18/2017, 9:58 PM      INDICATION: Severe headache.  TECHNIQUE: Routine.  CONTRAST: None.  COMPARISON: None.     FINDINGS: Alignment is normal. Vertebral body heights are maintained. No marrow edema. No cord signal abnormality. T2 hyperintense pituitary lesion described on MRI head same day.     The visualized intracranial contents are unremarkable. Grossly normal   paraspinal soft tissues. The vertebral artery flow voids are preserved. No abnormal enhancement.     C2-C3 through C7-T1: Normal disc height. No herniation. No facet arthropathy. No spinal canal stenosis. No right neural foraminal stenosis. No left neural foraminal stenosis.     IMPRESSION:   CONCLUSION:  1.  No significant degenerative change, spinal canal or foraminal narrowing.  2.  No abnormal enhancements or cord signal abnormality.         PMHX:     Patient Active Problem List   Diagnosis     Anxiety     Tachycardia     Thyroiditis     Hair loss     Positional headache     Hearing loss     Headache     Intractable headache     Pituitary disease (H)       No current outpatient medications on file.     No current facility-administered medications for this visit.        Social History     Tobacco Use     Smoking status: Never Smoker     Smokeless tobacco: Never Used   Substance Use Topics     Alcohol use: Yes     Comment: every  so often; 2-3 drinks per week     Drug use: No       Social History     Social History Narrative     Not on file       Allergies   Allergen Reactions     Benadryl [Diphenhydramine Hcl]               Review of Systems:    Complete ROS is negative except as noted in the HPI         Physical Exam:   /80   Pulse 77   Wt 136 lb 3 oz (61.8 kg)   LMP 04/19/2021   BMI 22.66 kg/m      General appearance: Alert, cooperative, no distress,  appears stated age  Head: Normocephalic, atraumatic, without obvious abnormality  Eyes: Pupils equal round, reactive.  Conjunctiva clear.  Neck: Supple, symmetric, trachea midline, no adenopathy.    Lungs: Clear to auscultation bilaterally, no wheezing or crackles present.  Respirations unlabored  Heart: Regular rate and rhythm, normal S1 and S2, no murmur, rub or gallop.    Right Shoulder/Neck:   Inspection: asymmetry? Spasm in right trapezius; dyskinesis? no   ROM:   Active - forward flexion - full/ abduction - limited by pain/ int rot - symmetric/ ext rot - symmetric   Passive- forward flexion - full/ abduction - full   Neck: limited rotation and flexion due to pain, full extension, and lateral deviation  Strength: deltoid/supraspinatous - 5/5; infraspinatous/ teres minor - 5/5; subscapularis - 5/5   Maneuvers:   RTC - empty can - painful;  Impingement - Dunn -painful  Biceps - Speed's - neg;   Labrum - Fonseca's - painful   Palpation: AC - neg; Acromion/ supraspinatus - neg; scapula - neg; bicipital groove - neg

## 2021-06-17 NOTE — PROGRESS NOTES
Assessment/Plan:     1. Routine general medical examination at a health care facility  Gynecologic Cytology (PAP Smear)    Cholesterol, Total    HDL Cholesterol    Glycosylated Hemoglobin A1c    Td, Preservative Free (green label)       1. Annual Physical Exam.  Encouraged healthy lifestyle habits including regular exercise, healthy eating habits, and adequate calcium and vitamin D intake.  2. Tetanus booster provided otherwise declines Covid vaccination series.  3. Total and HDL cholesterol, nonfasting.  Continue dietary and exercise modifications in order to maintain healthy cholesterol levels and lower cardiovascular risk factors.  4. Glycosylated hemoglobin obtained today, nonfasting for diabetes screen.  5. SurePath Pap smear obtained with HPV cotesting regardless.  6. Contraception counseling discussed currently using condoms and did suggest that her  may see me in future for sterilization procedure once elects.  7. Referred to dermatology consultants for random skin pigmented lesions that she would like to have evaluated without current evidence for significant atypia.          Subjective:     Regina Nguyen is a 30 y.o. female who presents for an annual exam.  In general doing well.  Needs Pap smear today.  Nonfasting.  Diet generally healthy.  Encouraging regular exercise.  Does have multiple skin lesions including one on left cheek which is brown currently enlarging that she would like to have dermatologist look at.  No significant family history of melanoma etc.  Perhaps abnormal Pap smear during prior pregnancy otherwise has not had recent issues with most recent Pap smear November 15, 2017 normal.  Denies recent illness.  Comprehensive review of systems as above otherwise all negative.    Healthy Habits:   Healthy Diet: Yes  Regular Exercise: Yes  Dental Visits Regularly: Yes  Seat Belt: Yes  Self Breast Exam Monthly:Yes    Health Maintenance reviewed - UTD.  Lipid Profile: Yes  Glucose  Screen: Yes  Last Mammogram: Yes  Colonoscopy: N/A  Last Dexa: N/A    Immunization History   Administered Date(s) Administered     DT (pediatric) 2005     HPV Quadrivalent 10/06/2010     Hep B, historic 2003, 2003, 2004     Influenza, Live, Nasal LAIV3 10/06/2010     Tdap 10/06/2010     Immunization status: needs Td booster.  Declines COVID-19 vaccine series.      Gynecologic History  Patient's last menstrual period was 2021.  Sexually active: Yes  Contraception: condoms  Last Pap: 11/15/17. Results were: normal      OB History    Para Term  AB Living   2 2 2 0 0 2   SAB TAB Ectopic Multiple Live Births   0 0 0 0 1      # Outcome Date GA Lbr Lito/2nd Weight Sex Delivery Anes PTL Lv   2 Term 14 39w1d 05:53 / 00:03 9 lb 0.6 oz (4.099 kg) M Vag-Spont None N FABIOLA      Complications: True knot in cord   1 Term                Current Outpatient Medications   Medication Sig Dispense Refill     cyclobenzaprine (FLEXERIL) 10 MG tablet Take 1 tablet (10 mg total) by mouth 3 (three) times a day as needed for muscle spasms. 20 tablet 0     No current facility-administered medications for this visit.      Past Medical History:   Diagnosis Date     Anemia      Anxiety disorder     anxiety     Disease of thyroid gland     hyperthyroidism; postpartum     Thyroiditis      Past Surgical History:   Procedure Laterality Date     APPENDECTOMY      7th grade     Benadryl [diphenhydramine hcl]  Family History   Problem Relation Age of Onset     Heart disease Father         a. fib     Heart disease Mother      Heart attack Mother 44        a second at 50+     No Medical Problems Brother      No Medical Problems Daughter      No Medical Problems Son      Heart disease Maternal Grandmother      Heart disease Maternal Grandfather      No Medical Problems Brother      Social History     Socioeconomic History     Marital status:      Spouse name: Not on file     Number of children: Not on  "file     Years of education: Not on file     Highest education level: Not on file   Occupational History     Not on file   Social Needs     Financial resource strain: Not on file     Food insecurity     Worry: Not on file     Inability: Not on file     Transportation needs     Medical: Not on file     Non-medical: Not on file   Tobacco Use     Smoking status: Never Smoker     Smokeless tobacco: Never Used   Substance and Sexual Activity     Alcohol use: Yes     Comment: every  so often; 2-3 drinks per week     Drug use: No     Sexual activity: Yes     Partners: Male     Birth control/protection: Condom   Lifestyle     Physical activity     Days per week: Not on file     Minutes per session: Not on file     Stress: Not on file   Relationships     Social connections     Talks on phone: Not on file     Gets together: Not on file     Attends Anabaptism service: Not on file     Active member of club or organization: Not on file     Attends meetings of clubs or organizations: Not on file     Relationship status: Not on file     Intimate partner violence     Fear of current or ex partner: Not on file     Emotionally abused: Not on file     Physically abused: Not on file     Forced sexual activity: Not on file   Other Topics Concern     Not on file   Social History Narrative     Not on file       Review of Systems  General:  Denies problem  Eyes: Denies problem  Ears/Nose/Throat: Denies problem  Cardiovascular: Denies problem  Respiratory:  Denies problem  Gastrointestinal:  Denies problem, Genitourinary: Denies problem  Musculoskeletal:  Denies problem  Skin: Denies problem  Neurologic: Denies problem  Psychiatric: Denies problem  Endocrine: Denies problem  Heme/Lymphatic: Denies problem   Allergic/Immunologic: Denies problem            Objective:        Vitals:    04/27/21 1352   BP: 100/60   Pulse: 92   SpO2: 99%   Weight: 140 lb (63.5 kg)   Height: 5' 4.75\" (1.645 m)     Body mass index is 23.48 kg/m .    Physical " Exam:    General Appearance: Alert, pleasant, appears stated age  Head: Normocephalic, without obvious abnormality  Eyes: PERRL, conjunctiva/corneas clear, EOM's intact  Ears: Normal TM's and external ear canals, both ears  Nose: Nares normal, septum midline,mucosa normal, no drainage  Throat: Lips, mucosa, and tongue normal; teeth and gums normal; oropharynx is clear  Neck: Supple,without lymphadenopathy or thyromegally  Lungs: Clear to auscultation bilaterally, respirations unlabored  Breasts: Nopalpable masses, tenderness, asymmetry, or nipple discharge. No axillary or supraclavicular lymphadenopathy  Heart: Regular rate and rhythm, no murmur   Abdomen: Soft, non-tender, no masses, no organomegaly  Pelvic:Normally developed genitalia with no external lesions or eruptions. Vagina and cervix show no lesions, inflammation, discharge or tenderness. No cystocele, No rectocele. Uterus not examined.  No bimanual exam indicated.  Extremities: Extremities with strong and symmetric pulses, no cyanosis or edema  Skin: Skin color, texture normal, no rashes or lesions  Neurologic: Normal            This note has been dictated using voice recognition software and as a result may contain minor grammatical errors and unintended word substitutions.

## 2021-06-18 NOTE — PROGRESS NOTES
Assessment and Plan:     1. Bacterial conjunctivitis of both eyes  Will treat with Polytrim eyedrops.  Educated on its indications and side effects.  She is to avoid rubbing the eyes.  Discussed symptomatic treatment and contagiousness.  Discussed concerning symptoms including eye pain, foreign body sensation, photophobia, decreased visual acuity.  If these occur, suggest patient see ophthalmology.  Provided note excusing patient from work today.  She is content with the plan.  - polymyxin B-trimethoprim (POLYTRIM) 10,000 unit- 1 mg/mL Drop ophthalmic drops; Insert one drop into the eyes every 3-6 hours for 7-10 days.  Dispense: 1 Bottle; Refill: 0    Subjective:     Regina is a 27 y.o. female presenting to the clinic for concerns for conjunctivitis.  Patient noticed that her right upper eyelid was swollen yesterday.  Her conjunctivae became injected.  She has noticed some purulent discharge and her eyes have been watery.  She denies eye pain, foreigm body sensation, photophobia, decreased visual acuity.  She has been using over-the-counter eyedrops with minimal relief.  She denies fever. She does admit to sneezing and postnasal drainage.  No one else around her has pinkeye.  She does work in a restaurant.  She feels as though symptoms are starting to develop in her left eye.    Review of Systems: A complete 14 point review of systems was obtained and is negative or as stated in the history of present illness.    Social History     Social History     Marital status:      Spouse name: N/A     Number of children: N/A     Years of education: N/A     Occupational History     Not on file.     Social History Main Topics     Smoking status: Never Smoker     Smokeless tobacco: Never Used     Alcohol use Yes      Comment: every  so often; 2-3 drinks per week     Drug use: No     Sexual activity: Yes     Partners: Male     Birth control/ protection: Condom     Other Topics Concern     Not on file     Social History  "Narrative       Active Ambulatory Problems     Diagnosis Date Noted     Anxiety      Tachycardia 2015     Thyroiditis 2015     Hair loss 2015     Positional headache 2017     Hearing loss 2017     Headache 2017     Intractable headache 2017     Pituitary disease 2017     Resolved Ambulatory Problems     Diagnosis Date Noted     Headache      Amenorrhea      Iron deficiency anemia secondary to inadequate dietary iron intake      Active labor at term 2014      (normal spontaneous vaginal delivery) 2014     Past Medical History:   Diagnosis Date     Anemia      Anxiety disorder      Disease of thyroid gland      Thyroiditis        Family History   Problem Relation Age of Onset     Heart disease Father      a. fib     Heart disease Mother      Heart attack Mother 44     a second at 50+     No Medical Problems Brother      No Medical Problems Daughter      No Medical Problems Son      Heart disease Maternal Grandmother      Heart disease Maternal Grandfather      No Medical Problems Brother        Objective:     /60  Pulse 82  Ht 5' 5\" (1.651 m)  Wt 127 lb 14.4 oz (58 kg)  BMI 21.28 kg/m2    Patient is alert, in no obvious distress.   Skin: Warm, dry.  No lesions or rashes.  Skin turgor rapid return.   HEENT:  Head normocephalic, atraumatic.  Eyes conjunctiva is injected bilaterally.  Her right upper eyelid is swollen.  No obvious stye is present.  There is crusting of her right upper eyelid lashes and purulent drainage in the inner canthus of her right eye.   PERRL.  EOM's intact.  No nystagmus. Ears normal.  Nose patent, mucosa pink.  Oropharynx mucosa pink.  No lesions or tonsillar enlargement.   Neck: Supple, no lymphadenopathy.  Lungs:  Clear to auscultation. Respirations even and unlabored.  No wheezing or rales noted.   Heart:  Regular rate and rhythm.  No murmurs.               "

## 2021-06-20 NOTE — PROGRESS NOTES
Assessment/Plan:    1. Lower abdominal pain  Lower abdominal discomfort reviewed.  Unclear etiology.  Urinalysis with trace leukocyte otherwise culture pending.  CBC normal.  Urine pregnancy test negative.  Pelvic ultrasound to be completed for midcycle abdominal pain status post prior appendectomy .  Will notify with results.  - Urinalysis-UC if Indicated  - HM2(CBC w/o Differential)  - Pregnancy (Beta-hCG, Qual), Urine  - US Pelvis With Transvaginal Non OB; Future  - Culture, Urine        Subjective:    Regina Nguyen is seen today for evaluation of abdominal discomfort.  Symptoms over past 3 days.  Lower abdomen.  Discomfort trying to roll over to the low back pain this morning.  Also has some tenderness along costal margins anterior bilateral.  No fevers or chills.  Somewhat decreased appetite.  No vomiting.  Mild nausea.  No diarrhea or constipation.  Not using birth control.  LMP normal 2 weeks ago.  No dysuria urgency or frequency.  No history of pyelonephritis.  Monogamous relationship.  No vaginal discharge.  No concerns with prior STD.  Comprehensive review of systems as above otherwise all negative.      Pt reports constant lower abdominal pain - bilateral   And pain below each rib and low back pain  Sx's x 3 days   The back pain started this morning  No naus or vomiting  No fever    Pt is eating and drinking   Regular bm's   No prob with urination   Increased pain when pressing on her abdomen    No increased pain with a deep breath  Ibuprofen, heating pad, hot bath are not helpful        - Pernell x 6/19/15  1 daughter - Grayson  1 son - Irving  5th year senior (graduated in ) [employer] - major - elementary ed; major - communications   No smoke   No EtOH   Mom - Glenny (Ita) parents    Dad - a. fib  2 bros -   MGD - DM   PGD - bone CA   MGM - melanoma, emphysema ()   PGM - CVA   Mat uncle - bone CA   Surgeries: [facility] ~    Cell #[phone], message OK (08  results)   Pt needs pap done   Patient is on Goal List for STD Screening.      Past Surgical History:   Procedure Laterality Date     APPENDECTOMY      7th grade        Family History   Problem Relation Age of Onset     Heart disease Father      a. fib     Heart disease Mother      Heart attack Mother 44     a second at 50+     No Medical Problems Brother      No Medical Problems Daughter      No Medical Problems Son      Heart disease Maternal Grandmother      Heart disease Maternal Grandfather      No Medical Problems Brother         Past Medical History:   Diagnosis Date     Anemia      Anxiety disorder     anxiety     Disease of thyroid gland     hyperthyroidism; postpartum     Thyroiditis         Social History   Substance Use Topics     Smoking status: Never Smoker     Smokeless tobacco: Never Used     Alcohol use Yes      Comment: every  so often; 2-3 drinks per week        No current outpatient prescriptions on file.     No current facility-administered medications for this visit.           Objective:    Vitals:    08/28/18 1055   BP: 100/70   Pulse: 75   Temp: 97.7  F (36.5  C)   SpO2: 100%   Weight: 134 lb (60.8 kg)      Body mass index is 22.3 kg/(m^2).    Alert.  No apparent distress.  Chest clear.  Cardiac exam regular without significant tachycardia.  Abdomen positive bowel sounds without distention.  She has mild rebound tenderness with attempt at jumping and landing.  Does have lower abdominal tenderness suprapubic location primarily perhaps left greater than right.  No CVA tenderness.  No rash.  Extremities warm and dry.      This note has been dictated using voice recognition software and as a result may contain minor grammatical errors and unintended word substitutions.

## 2021-06-25 NOTE — PROGRESS NOTES
Progress Notes by Kaelyn Mai MD at 5/17/2017  3:40 PM     Author: Kaelyn Mai MD Service: -- Author Type: Physician    Filed: 5/18/2017 12:10 PM Encounter Date: 5/17/2017 Status: Addendum    : Kaelyn Mai MD (Physician)    Related Notes: Original Note by Kaelyn Mai MD (Physician) filed at 5/17/2017  4:27 PM       ASSESSMENT AND PLAN:  Problem List Items Addressed This Visit     Hair loss    Positional headache     Left side, hearing loss to finger rubbing in setting of left side headache x 5 days which is positional.  Mri brain ordered.     Addendum 5/18/2017  Mri brain shows no mass lesion but there is a small cystic structure near pituitary.    I will contact neurology to see if neurology consult is appropriate and if they want any other studies prior to seeing her.    I called and spoke to Dr. Car Serrano at [facility] in [place].  He is at the positional headache made him think of a spontaneous spinal fluid leak.  He recommended an MRI of the C-spine and T-spine to look specifically for extravasation of spinal fluid out of the dura.  If that is the case a blind lumbar puncture with blood patch may be adequate to treat this.  He also mentioned that if her pain is significant enough it is also reasonable to hospitalize to expedite treatment of this.    Dr. Serrano also suggested that we discuss whether the quality of the headache is pulsatile as that may indicate sagittal sinus thrombosis although Regina does not seem to have any hypercoagulable risk factors (i.e. she is not on any oral contraceptive pills), However if that was the case we would need to do an MRV of the brain.         Relevant Orders    MR Head Without Contrast (Completed)    Hearing loss     Left side, hearing loss to finger rubbing in setting of left side headache x 5 days which is positional.  Mri brain ordered.          Relevant Orders    MR Head Without Contrast (Completed)      Other Visit  Diagnoses     Headache    -  Primary         Chief Complaint   Patient presents with   ? Headache     Patient has had a headache for the past 5 days. Ununsual for her - doesn't usually get headaches. Located on the left side of head, above ear. Also will get lightheaded at times. Feels tired because it's been keeping her awake at night. No other symptoms - no light sensitivity, no nausea or vomiting, no vision changes.      HPI  Regina Nguyen is a 26 y.o. female comes in for a new headache for the last 5 days.  It is quite severe and it seems to be better when she lays down and worse when she sits up.  Sometimes she notices her scalp even hurts, but most of the time it is much deeper in her scalp does not hurt.  She has noticed no signs of weakness in any limb, speech difficulty or difficulty thinking.    History   Smoking Status   ? Never Smoker   Smokeless Tobacco   ? Never Used      Current Outpatient Prescriptions   Medication Sig Dispense Refill   ? ALPRAZolam (XANAX) 0.5 MG tablet Take 1 tablet right before mri. 2 tablet 0     No current facility-administered medications for this visit.      No Known Allergies  Review of Systems   Constitutional: Negative.    HENT: Negative.    Eyes: Negative.    Respiratory: Negative.    Cardiovascular: Negative.    Gastrointestinal: Negative.    Endocrine: Negative.    Genitourinary: Negative.    Musculoskeletal: Negative.    Skin: Negative.    Neurological: Negative.    Hematological: Negative.    Psychiatric/Behavioral: Negative.      OBJECTIVE: BP 96/60  Pulse 88  Temp 98.6  F (37  C) (Oral)   Resp 12  LMP 05/03/2017 (Approximate)  Breastfeeding? No  Physical Exam   Constitutional: She is oriented to person, place, and time. She appears well-developed and well-nourished.   HENT:   Head: Normocephalic and atraumatic.       See diagram for area of pain, although there is currently no pain with palpation of the scalp in that area.   Eyes: Conjunctivae are normal.    Cardiovascular: Normal rate, regular rhythm and normal heart sounds.    Pulmonary/Chest: Effort normal and breath sounds normal.   Abdominal: Soft.   Neurological: She is alert and oriented to person, place, and time. She has normal strength. A sensory deficit is present. She displays a negative Romberg sign.     Diminished hearing to finger rubbing on the left.      Normal hearing to finger rubbing on the right.Neuro exam is normal including gait, rapidly alternating movements, extraocular movements are intact, pupils are equally round and reactive to light and accommodation, there is no arm drift, she is able to stand on 1 foot at a time without losing balance, finger to nose is normal.  Coordination is normal.  Sensation is symmetrical bilaterally. facial symmetry is normal.  Normal Romberg exam.  Normal heel-to-shin.      Skin: Skin is warm and dry.   Psychiatric: She has a normal mood and affect.

## 2021-07-03 NOTE — ADDENDUM NOTE
Addendum Note by Edd Mai MD at 5/18/2017 12:10 PM     Author: Edd Mai MD Service: -- Author Type: Physician    Filed: 5/18/2017 12:10 PM Encounter Date: 5/17/2017 Status: Signed    : Edd Mai MD (Physician)    Addended by: EDD MAI on: 5/18/2017 12:10 PM        Modules accepted: Orders

## 2021-09-18 ENCOUNTER — HEALTH MAINTENANCE LETTER (OUTPATIENT)
Age: 31
End: 2021-09-18

## 2021-11-09 ENCOUNTER — OFFICE VISIT (OUTPATIENT)
Dept: FAMILY MEDICINE | Facility: CLINIC | Age: 31
End: 2021-11-09
Payer: COMMERCIAL

## 2021-11-09 VITALS
SYSTOLIC BLOOD PRESSURE: 100 MMHG | WEIGHT: 142 LBS | DIASTOLIC BLOOD PRESSURE: 60 MMHG | HEART RATE: 91 BPM | OXYGEN SATURATION: 99 % | BODY MASS INDEX: 23.81 KG/M2

## 2021-11-09 DIAGNOSIS — F41.9 ANXIETY: ICD-10-CM

## 2021-11-09 DIAGNOSIS — B97.7 HIGH RISK HPV INFECTION: ICD-10-CM

## 2021-11-09 DIAGNOSIS — G47.00 INSOMNIA, UNSPECIFIED TYPE: Primary | ICD-10-CM

## 2021-11-09 DIAGNOSIS — R07.89 ATYPICAL CHEST PAIN: ICD-10-CM

## 2021-11-09 DIAGNOSIS — L65.9 ALOPECIA: ICD-10-CM

## 2021-11-09 LAB
ALBUMIN SERPL-MCNC: 4 G/DL (ref 3.5–5)
ALP SERPL-CCNC: 52 U/L (ref 45–120)
ALT SERPL W P-5'-P-CCNC: <9 U/L (ref 0–45)
ANION GAP SERPL CALCULATED.3IONS-SCNC: 9 MMOL/L (ref 5–18)
AST SERPL W P-5'-P-CCNC: 12 U/L (ref 0–40)
BILIRUB SERPL-MCNC: 0.9 MG/DL (ref 0–1)
BUN SERPL-MCNC: 7 MG/DL (ref 8–22)
CALCIUM SERPL-MCNC: 8.9 MG/DL (ref 8.5–10.5)
CHLORIDE BLD-SCNC: 104 MMOL/L (ref 98–107)
CO2 SERPL-SCNC: 28 MMOL/L (ref 22–31)
CREAT SERPL-MCNC: 0.67 MG/DL (ref 0.6–1.1)
ERYTHROCYTE [DISTWIDTH] IN BLOOD BY AUTOMATED COUNT: 11.9 % (ref 10–15)
GFR SERPL CREATININE-BSD FRML MDRD: >90 ML/MIN/1.73M2
GLUCOSE BLD-MCNC: 88 MG/DL (ref 70–125)
HCT VFR BLD AUTO: 38.1 % (ref 35–47)
HGB BLD-MCNC: 12.7 G/DL (ref 11.7–15.7)
MCH RBC QN AUTO: 30.6 PG (ref 26.5–33)
MCHC RBC AUTO-ENTMCNC: 33.3 G/DL (ref 31.5–36.5)
MCV RBC AUTO: 92 FL (ref 78–100)
PLATELET # BLD AUTO: 188 10E3/UL (ref 150–450)
POTASSIUM BLD-SCNC: 4.1 MMOL/L (ref 3.5–5)
PROT SERPL-MCNC: 6.7 G/DL (ref 6–8)
RBC # BLD AUTO: 4.15 10E6/UL (ref 3.8–5.2)
SODIUM SERPL-SCNC: 141 MMOL/L (ref 136–145)
TSH SERPL DL<=0.005 MIU/L-ACNC: 1.77 UIU/ML (ref 0.3–5)
WBC # BLD AUTO: 5.2 10E3/UL (ref 4–11)

## 2021-11-09 PROCEDURE — 36415 COLL VENOUS BLD VENIPUNCTURE: CPT | Performed by: FAMILY MEDICINE

## 2021-11-09 PROCEDURE — 85027 COMPLETE CBC AUTOMATED: CPT | Performed by: FAMILY MEDICINE

## 2021-11-09 PROCEDURE — 99214 OFFICE O/P EST MOD 30 MIN: CPT | Performed by: FAMILY MEDICINE

## 2021-11-09 PROCEDURE — 84443 ASSAY THYROID STIM HORMONE: CPT | Performed by: FAMILY MEDICINE

## 2021-11-09 PROCEDURE — 80053 COMPREHEN METABOLIC PANEL: CPT | Performed by: FAMILY MEDICINE

## 2021-11-09 RX ORDER — VENLAFAXINE HYDROCHLORIDE 75 MG/1
75 TABLET, EXTENDED RELEASE ORAL DAILY
Qty: 30 TABLET | Refills: 2 | Status: SHIPPED | OUTPATIENT
Start: 2021-11-09 | End: 2021-11-11

## 2021-11-09 RX ORDER — ZOLPIDEM TARTRATE 5 MG/1
5 TABLET ORAL
Qty: 30 TABLET | Refills: 2 | Status: SHIPPED | OUTPATIENT
Start: 2021-11-09 | End: 2022-06-06

## 2021-11-09 ASSESSMENT — ANXIETY QUESTIONNAIRES
GAD7 TOTAL SCORE: 2
1. FEELING NERVOUS, ANXIOUS, OR ON EDGE: SEVERAL DAYS
4. TROUBLE RELAXING: NOT AT ALL
7. FEELING AFRAID AS IF SOMETHING AWFUL MIGHT HAPPEN: NOT AT ALL
8. IF YOU CHECKED OFF ANY PROBLEMS, HOW DIFFICULT HAVE THESE MADE IT FOR YOU TO DO YOUR WORK, TAKE CARE OF THINGS AT HOME, OR GET ALONG WITH OTHER PEOPLE?: SOMEWHAT DIFFICULT
2. NOT BEING ABLE TO STOP OR CONTROL WORRYING: NOT AT ALL
3. WORRYING TOO MUCH ABOUT DIFFERENT THINGS: NOT AT ALL
7. FEELING AFRAID AS IF SOMETHING AWFUL MIGHT HAPPEN: NOT AT ALL
5. BEING SO RESTLESS THAT IT IS HARD TO SIT STILL: NOT AT ALL
GAD7 TOTAL SCORE: 2
6. BECOMING EASILY ANNOYED OR IRRITABLE: SEVERAL DAYS
GAD7 TOTAL SCORE: 2

## 2021-11-09 NOTE — PROGRESS NOTES
Assessment/Plan:    Insomnia, unspecified type  Insomnia management reviewed.  Trial of zolpidem 5 mg at bedtime on as-needed basis.  Sleep hygiene reviewed.  Notify persistent concerns.  - zolpidem (AMBIEN) 5 MG tablet  Dispense: 30 tablet; Refill: 2    Anxiety  Anxiety with history of panic disorder.  Has tried fluoxetine historically and remains off medicine.  Would like to try something different.  Will try venlafaxine extended release 75 mg daily.  Anticipate reassessment in office in the next 6 to 12 weeks to ensure desired improvement.  - venlafaxine (EFFEXOR-ER) 75 MG 24 hr tablet  Dispense: 30 tablet; Refill: 2    Atypical chest pain  Described atypical chest pain associate with anxiety concerns.  Low risk cardiac etiology will notify of persistent concerns.    High risk HPV infection  High risk HPV previously with normal Pap smear and will repeat Pap smear April 2022 at 1 year interval with cotesting.    Alopecia  Alopecia nonspecific and check TSH CBC and comprehensive metabolic panels today.  - TSH  - CBC with platelets  - Comprehensive metabolic panel      Answers for HPI/ROS submitted by the patient on 11/9/2021  YARELY 7 TOTAL SCORE: 2  Headache Symptoms are: worsened  How often are you getting headaches or migraines? : 2 x a week  Are you able to do normal daily activities when you have a migraine?: Yes  Migraine Rescue/Relief Medications:: Ibuprofen (Advil, Motrin)  Effectiveness of rescue/relief medications:: I get some relief  Migraine Preventative Medications:: no medications to prevent migraines  ER or UC Visits:: 0 times  Depression/Anxiety: Anxiety  Anxiety since last: : worse  Other associated symotome: : No  Significant life event: : No  Anxious:: Yes  Current substance use:: No  How many servings of fruits and vegetables do you eat daily?: 0-1  On average, how many sweetened beverages do you drink each day (Examples: soda, juice, sweet tea, etc.  Do NOT count diet or artificially sweetened  beverages)?: 0  How many minutes a day do you exercise enough to make your heart beat faster?: 30 to 60  How many days a week do you exercise enough to make your heart beat faster?: 3 or less  How many days per week do you miss taking your medication?: 0            Subjective:    Regina Nguyen is seen today for insomnia concerns.  Can fall asleep but difficulty staying asleep.  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  Anxiety history.  Has used fluoxetine 20 mg daily in the past but not recently.  Would like to try different medication for anxiety management.  Prior Pap smear with HPV cotesting of high risk HPV none type 16 or 18.  Nonspecific hair loss.  Right lower abdominal discomfort.  Has had prior appendectomy in .  Does not feel concerns for constipation.  Denies recent fever.  Did complete Pfizer vaccine series 2021.  Declines seasonal flu shot at this time unless mandated.  Comprehensive review of system as above otherwise all negative.     - Pernell x 6/19/15   1 daughter - Grayson   1 son - Irving   5th year senior (graduated in ) [employer] - major - elementary ed; major - communications   No smoke   No EtOH   Mom - Glenny (Ita) parents    Dad - radha. fib   2 bros -   MGD - DM   PGD - bone CA   MGM - melanoma, emphysema ()   PGM - CVA   Mat uncle - bone CA   Surgeries: Appy ~     Past Surgical History:   Procedure Laterality Date     APPENDECTOMY      7th grade     IR BLOOD PATCH  2017        Family History   Problem Relation Age of Onset     Heart Disease Father         a. fib     Heart Disease Mother      Coronary Artery Disease Mother 44.00        a second at 50+     No Known Problems Brother      No Known Problems Daughter      No Known Problems Son      Heart Disease Maternal Grandmother      Heart Disease Maternal Grandfather      No Known Problems Brother          Past Medical History:   Diagnosis Date     Anemia      Anxiety disorder     anxiety     Cervical high risk HPV (human papillomavirus) test positive 4/27/2021    3/25/13 ASCUS, neg HPV @ 23 yo 2/4/14 ASCUS, neg HPV @ 22 yo 11/15/17 NIL 4/27/21 NIL pap, +HR HPV (not 16/18). Plan: cotest in 1 year     Disease of thyroid gland     hyperthyroidism; postpartum     Thyroiditis         Social History     Tobacco Use     Smoking status: Never Smoker     Smokeless tobacco: Never Used   Substance Use Topics     Alcohol use: Yes     Comment: Alcoholic Drinks/day: every  so often; 2-3 drinks per week     Drug use: No        Current Outpatient Medications   Medication Sig Dispense Refill     venlafaxine (EFFEXOR-ER) 75 MG 24 hr tablet Take 1 tablet (75 mg) by mouth daily 30 tablet 2     zolpidem (AMBIEN) 5 MG tablet Take 1 tablet (5 mg) by mouth nightly as needed for sleep 30 tablet 2          Objective:    Vitals:    11/09/21 1112   BP: 100/60   Pulse: 91   SpO2: 99%   Weight: 64.4 kg (142 lb)      Body mass index is 23.81 kg/m .    Alert.  No apparent distress.  Chest clear.  Cardiac exam regular.  Abdomen benign without guarding or rebound.  No abdominal mass.  Extremities warm and dry.  Deep tendon reflexes symmetric 2+.      This note has been dictated using voice recognition software and as a result may contain minor grammatical errors and unintended word substitutions.

## 2021-11-09 NOTE — PROGRESS NOTES
Answers for HPI/ROS submitted by the patient on 11/9/2021  YARELY 7 TOTAL SCORE: 2  Headache Symptoms are: worsened  How often are you getting headaches or migraines? : 2 x a week  Are you able to do normal daily activities when you have a migraine?: Yes  Migraine Rescue/Relief Medications:: Ibuprofen (Advil, Motrin)  Effectiveness of rescue/relief medications:: I get some relief  Migraine Preventative Medications:: no medications to prevent migraines  ER or UC Visits:: 0 times  Depression/Anxiety: Anxiety  Anxiety since last: : worse  Other associated symotome: : No  Significant life event: : No  Anxious:: Yes  Current substance use:: No  How many servings of fruits and vegetables do you eat daily?: 0-1  On average, how many sweetened beverages do you drink each day (Examples: soda, juice, sweet tea, etc.  Do NOT count diet or artificially sweetened beverages)?: 0  How many minutes a day do you exercise enough to make your heart beat faster?: 30 to 60  How many days a week do you exercise enough to make your heart beat faster?: 3 or less  How many days per week do you miss taking your medication?: 0

## 2021-11-10 ENCOUNTER — TELEPHONE (OUTPATIENT)
Dept: FAMILY MEDICINE | Facility: CLINIC | Age: 31
End: 2021-11-10
Payer: COMMERCIAL

## 2021-11-10 DIAGNOSIS — F41.9 ANXIETY: Primary | ICD-10-CM

## 2021-11-10 ASSESSMENT — ANXIETY QUESTIONNAIRES: GAD7 TOTAL SCORE: 2

## 2021-11-10 NOTE — TELEPHONE ENCOUNTER
Pharmacy sent a request for PA for the following medication :    venlafaxine (EFFEXOR-ER) 75 MG 24 hr tablet 30 tablet 2 11/9/2021  --   Sig - Route: Take 1 tablet (75 mg) by mouth daily - Oral   Sent to pharmacy as: Venlafaxine HCl ER 75 MG Oral Tablet Extended Release 24 Hour (EFFEXOR-ER)   Class: E-Prescribe   Order: [id number]     Please initiate.

## 2021-11-11 RX ORDER — VENLAFAXINE HYDROCHLORIDE 75 MG/1
75 CAPSULE, EXTENDED RELEASE ORAL DAILY
Qty: 30 CAPSULE | Refills: 2 | Status: SHIPPED | OUTPATIENT
Start: 2021-11-11

## 2021-11-11 NOTE — TELEPHONE ENCOUNTER
Can patient be changed to venlafaxine ER capsules, those are preferred over tablets?  If provider is ok with change to capsules please provide pharmacy with new prescription.

## 2021-11-12 NOTE — TELEPHONE ENCOUNTER
Notify patient.  New rx sent to pharmacy for capsules in place of tablets per insurance requirement.

## 2021-12-09 ENCOUNTER — TELEPHONE (OUTPATIENT)
Dept: FAMILY MEDICINE | Facility: CLINIC | Age: 31
End: 2021-12-09

## 2021-12-09 ENCOUNTER — VIRTUAL VISIT (OUTPATIENT)
Dept: INTERNAL MEDICINE | Facility: CLINIC | Age: 31
End: 2021-12-09
Payer: COMMERCIAL

## 2021-12-09 DIAGNOSIS — Z20.822 ENCOUNTER FOR LABORATORY TESTING FOR COVID-19 VIRUS: Primary | ICD-10-CM

## 2021-12-09 DIAGNOSIS — B30.9 VIRAL CONJUNCTIVITIS, UNSPECIFIED: ICD-10-CM

## 2021-12-09 PROCEDURE — 99213 OFFICE O/P EST LOW 20 MIN: CPT | Mod: 95 | Performed by: INTERNAL MEDICINE

## 2021-12-09 RX ORDER — POLYMYXIN B SULFATE AND TRIMETHOPRIM 1; 10000 MG/ML; [USP'U]/ML
1-2 SOLUTION OPHTHALMIC EVERY 6 HOURS
Qty: 10 ML | Refills: 1 | Status: SHIPPED | OUTPATIENT
Start: 2021-12-09 | End: 2022-02-07

## 2021-12-09 NOTE — LETTER
[facility]  [address] [place] [address]  [phone]          December 9, 2021    RE:  Regina Nguyen                                                                                                                                                       [address] [address]            To whom it may concern:    Regina Nguyen is under my professional care for    Encounter for laboratory testing for COVID-19 virus  Viral conjunctivitis, unspecified She  Should be excused from work dec 10th as she is contagious.     Sincerely,        Berta Hsu MD

## 2021-12-09 NOTE — TELEPHONE ENCOUNTER
spoke with Regina to ask her if she wanted letter from Dr. Hsu faxed or mailed. Regina indicated that Dr. Hsu put letter in MYChart.

## 2021-12-09 NOTE — PROGRESS NOTES
Regina is a 31 year old who is being evaluated via a billable telephone visit.      What phone number would you like to be contacted at? [phone]  How would you like to obtain your AVS? MyChart    Assessment & Plan     Encounter for laboratory testing for COVID-19 virus  Given the unusual symptoms of Covid infection and the sore throat it is prudent that she get checked.  She does work in the hospital as well.  Will order Covid test  - Symptomatic COVID-19 Virus (Coronavirus) by PCR Nasopharyngeal    Viral conjunctivitis, unspecified  Symptoms she describes a very mild.  They do not involve both eyes butlibra does not really meet the criteria for bacterial conjunctivitis either.  I do recommend she send me photographs of the eye.  She can try simple cold compresses I did send in antibiotic eyedrops given the fact that she does wear lenses and in the next 24 hours if she does develop crusting or discharge she can use that.  Meanwhile she should be excused from work given that conjunctivitis is contagious.  - trimethoprim-polymyxin b (POLYTRIM) 56133-5.1 UNIT/ML-% ophthalmic solution  Dispense: 10 mL; Refill: 1                   No follow-ups on file.    Breta Hsu MD  [facility]   Regina is a 31 year old who presents for the following health issues   went to bed with eye pain she is wears contact lenses.  Been wearing her glasses now.  She says the whites of her eyes which is slightly pink there is no discharge in the eyelashes no crusting and or sticking together of her eyelids.  Her vision is normal.  She has no prior history of conjunctivitis she also has a slight sore throat she has known glandular enlargement in her neck or in front of her ears.  She has no headache or fever.  She says the other eye is normal.  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  HPI   She she is vaccinated for Covid.  She has no chronic disease that would cause her to be  immunocompromised  Patient Active Problem List   Diagnosis     Anxiety     Tachycardia     Thyroiditis     Hair loss     Positional headache     Hearing loss     Headache     Intractable headache     Pituitary disease (H)     Cervical high risk HPV (human papillomavirus) test positive     Current Outpatient Medications   Medication     trimethoprim-polymyxin b (POLYTRIM) 30685-4.1 UNIT/ML-% ophthalmic solution     venlafaxine (EFFEXOR-XR) 75 MG 24 hr capsule     zolpidem (AMBIEN) 5 MG tablet     No current facility-administered medications for this visit.           Review of Systems   Constitutional, HEENT, cardiovascular, pulmonary, gi and gu systems are negative, except as otherwise noted.      Objective           Vitals:  No vitals were obtained today due to virtual visit.    Physical Exam   healthy, alert and no distress  PSYCH: Alert and oriented times 3; coherent speech, normal   rate and volume, able to articulate logical thoughts, able   to abstract reason, no tangential thoughts, no hallucinations   or delusions  Her affect is normal  RESP: No cough, no audible wheezing, able to talk in full sentences  Remainder of exam unable to be completed due to telephone visits                Phone call duration: 12  minutes

## 2021-12-10 ENCOUNTER — LAB (OUTPATIENT)
Dept: FAMILY MEDICINE | Facility: CLINIC | Age: 31
End: 2021-12-10
Attending: INTERNAL MEDICINE
Payer: COMMERCIAL

## 2021-12-10 DIAGNOSIS — Z20.822 ENCOUNTER FOR LABORATORY TESTING FOR COVID-19 VIRUS: ICD-10-CM

## 2021-12-10 LAB — SARS-COV-2 RNA RESP QL NAA+PROBE: NEGATIVE

## 2021-12-10 PROCEDURE — U0005 INFEC AGEN DETEC AMPLI PROBE: HCPCS

## 2021-12-10 PROCEDURE — U0003 INFECTIOUS AGENT DETECTION BY NUCLEIC ACID (DNA OR RNA); SEVERE ACUTE RESPIRATORY SYNDROME CORONAVIRUS 2 (SARS-COV-2) (CORONAVIRUS DISEASE [COVID-19]), AMPLIFIED PROBE TECHNIQUE, MAKING USE OF HIGH THROUGHPUT TECHNOLOGIES AS DESCRIBED BY CMS-2020-01-R: HCPCS

## 2022-01-04 ENCOUNTER — NURSE TRIAGE (OUTPATIENT)
Dept: NURSING | Facility: CLINIC | Age: 32
End: 2022-01-04
Payer: COMMERCIAL

## 2022-01-04 NOTE — TELEPHONE ENCOUNTER
"Triage Call:    Patient calling with chest pain \"feels like an elephant is on me\", feels when is propped up is better and is also short of breath.  Feels short of breath when up and walking.      Rates pain 5/10 right now, goes get worse, up to 9/10 last night.      Pt was advised of protocol recommendation/disposition of ED.     Anne Galvan RN on 1/4/2022 at 4:13 PM        COVID 19 Nurse Triage Plan/Patient Instructions    Please be aware that novel coronavirus (COVID-19) may be circulating in the community. If you develop symptoms such as fever, cough, or SOB or if you have concerns about the presence of another infection including coronavirus (COVID-19), please contact your health care provider or visit [email or web address].     Disposition/Instructions    ED Visit recommended. Follow protocol based instructions.     Bring Your Own Device:  Please also bring your smart device(s) (smart phones, tablets, laptops) and their charging cables for your personal use and to communicate with your care team during your visit.    Thank you for taking steps to prevent the spread of this virus.  o Limit your contact with others.  o Wear a simple mask to cover your cough.  o Wash your hands well and often.    Resources    [facility]: About COVID-19: [email or web address]    CDC: What to Do If You're Sick: www.cdc.gov/coronavirus/2019-ncov/about/steps-when-sick.html    CDC: Ending Home Isolation: www.cdc.gov/coronavirus/2019-ncov/hcp/disposition-in-home-patients.html     CDC: Caring for Someone: www.cdc.gov/coronavirus/2019-ncov/if-you-are-sick/care-for-someone.html     [facility]: Interim Guidance for Hospital Discharge to Home: www.health.[facility].[place]/diseases/coronavirus/hcp/hospdischarge.pdf    [facility] clinical trials (COVID-19 research studies): clinicalaffairs.[email or web address]/umn-clinical-trials     Below are the COVID-19 hotlines at the [place] Department of [facility] ([facility]). Interpreters are available. "   o For health questions: Call 720-884-5766 or 1-853.557.4854 (7 a.m. to 7 p.m.)  o For questions about schools and childcare: Call 486-585-4550 or 1-168.902.9718 (7 a.m. to 7 p.m.)                   Reason for Disposition    Difficulty breathing    Additional Information    Negative: Severe difficulty breathing (e.g., struggling for each breath, speaks in single words)    Negative: Passed out (i.e., fainted, collapsed and was not responding)    Negative: Difficult to awaken or acting confused (e.g., disoriented, slurred speech)    Negative: Shock suspected (e.g., cold/pale/clammy skin, too weak to stand, low BP, rapid pulse)    Negative: Chest pain lasting longer than 5 minutes and ANY of the following:* Over 45 years old* Over 30 years old and at least one cardiac risk factor (e.g., diabetes, high blood pressure, high cholesterol, smoker, or strong family history of heart disease)* History of heart disease (i.e., angina, heart attack, heart failure, bypass surgery, takes nitroglycerin)* Pain is crushing, pressure-like, or heavy    Negative: Heart beating < 50 beats per minute OR > 140 beats per minute    Negative: Visible sweat on face or sweat dripping down face    Negative: Sounds like a life-threatening emergency to the triager    Negative: Followed an injury to chest    Negative: SEVERE chest pain    Negative: Pain also in shoulder(s) or arm(s) or jaw    Protocols used: CHEST PAIN-A-OH

## 2022-01-18 VITALS
HEART RATE: 78 BPM | DIASTOLIC BLOOD PRESSURE: 70 MMHG | BODY MASS INDEX: 23.23 KG/M2 | SYSTOLIC BLOOD PRESSURE: 120 MMHG | OXYGEN SATURATION: 98 % | WEIGHT: 139.6 LBS

## 2022-01-18 VITALS
OXYGEN SATURATION: 99 % | HEIGHT: 65 IN | WEIGHT: 140 LBS | DIASTOLIC BLOOD PRESSURE: 60 MMHG | HEART RATE: 92 BPM | BODY MASS INDEX: 23.32 KG/M2 | SYSTOLIC BLOOD PRESSURE: 100 MMHG

## 2022-01-18 VITALS
DIASTOLIC BLOOD PRESSURE: 80 MMHG | BODY MASS INDEX: 22.66 KG/M2 | SYSTOLIC BLOOD PRESSURE: 120 MMHG | HEART RATE: 77 BPM | WEIGHT: 136.19 LBS

## 2022-02-07 ENCOUNTER — OFFICE VISIT (OUTPATIENT)
Dept: FAMILY MEDICINE | Facility: CLINIC | Age: 32
End: 2022-02-07
Payer: COMMERCIAL

## 2022-02-07 VITALS
TEMPERATURE: 99 F | SYSTOLIC BLOOD PRESSURE: 114 MMHG | OXYGEN SATURATION: 100 % | HEART RATE: 76 BPM | BODY MASS INDEX: 24.17 KG/M2 | WEIGHT: 144.1 LBS | DIASTOLIC BLOOD PRESSURE: 90 MMHG

## 2022-02-07 DIAGNOSIS — H69.92 DYSFUNCTION OF LEFT EUSTACHIAN TUBE: Primary | ICD-10-CM

## 2022-02-07 DIAGNOSIS — H92.02 LEFT EAR PAIN: ICD-10-CM

## 2022-02-07 PROCEDURE — 99213 OFFICE O/P EST LOW 20 MIN: CPT | Performed by: FAMILY MEDICINE

## 2022-02-07 RX ORDER — METHYLPREDNISOLONE 4 MG
TABLET, DOSE PACK ORAL
Qty: 21 TABLET | Refills: 0 | Status: SHIPPED | OUTPATIENT
Start: 2022-02-07 | End: 2022-06-15

## 2022-02-07 NOTE — PROGRESS NOTES
Assessment & Plan     Dysfunction of left eustachian tube    - methylPREDNISolone (MEDROL DOSEPAK) 4 MG tablet therapy pack  Dispense: 21 tablet; Refill: 0    Left ear pain    Provided reassurance that there is no signs of otitis media on exam.  Discussed that her symptoms are consistent with eustachian tube dysfunction.  Could be related to recent Covid infection.  We discussed some medications that she can try to help relieve discomfort.  Discussed use of ibuprofen and Tylenol for pain.  Can try decongestant such as Sudafed or antihistamine such as Claritin or Zyrtec.  Discussed that steroid nasal sprays can be tried.  We also discussed possibly treating with a short course of oral steroids.  Discussed that none of these medications may cause significant improvement in pain and that this condition typically resolves on its own with time but may take several weeks to completely resolve.  She would like to try the oral steroid.  Prescription for Medrol Dosepak was sent to pharmacy.  Counseled her on use of medication and side effects.  Discussed that if symptoms do not improve within 12 weeks she should see ENT specialist.  Can also see ENT specialist sooner if she develops significant hearing loss or other new symptoms of concern.                 No follow-ups on file.    Sandrine Duran MD  [facility] CITLALLI Tapia is a 31 year old who presents for the following health issues     HPI   She is seen today with concern about left ear pain.  This has been going on for about 8 days.  She did have a little bit of yellowish to blackish drainage from the ear.  She has not had fevers, chills or body aches.  She has not had rhinorrhea or congestion.  No loss of hearing.  No dizziness or lightheadedness.  No popping or cracking within the ears.  She did have Covid about 3 weeks ago and is not sure if this is related to the Covid infection.  Want to make sure she does not have an ear  infection.  She has been taking Tylenol and ibuprofen for pain relief.  No other concerns today.  Review of systems is otherwise negative.        Review of Systems         Objective    BP (!) 114/90 (BP Location: Right arm, Cuff Size: Adult Regular)   Pulse 76   Temp 99  F (37.2  C) (Temporal)   Wt 65.4 kg (144 lb 1.6 oz)   SpO2 100%   BMI 24.17 kg/m    Body mass index is 24.17 kg/m .  Physical Exam   GENERAL: healthy, alert and no distress  EYES: Eyes grossly normal to inspection, PERRL and conjunctivae and sclerae normal  HENT: ear canals and TM's normal, nose and mouth without ulcers or lesions  PSYCH: mentation appears normal, affect normal/bright

## 2022-03-05 ENCOUNTER — HEALTH MAINTENANCE LETTER (OUTPATIENT)
Age: 32
End: 2022-03-05

## 2022-04-11 ENCOUNTER — PATIENT OUTREACH (OUTPATIENT)
Dept: FAMILY MEDICINE | Facility: CLINIC | Age: 32
End: 2022-04-11
Payer: COMMERCIAL

## 2022-04-11 PROBLEM — R87.810 CERVICAL HIGH RISK HPV (HUMAN PAPILLOMAVIRUS) TEST POSITIVE: Status: ACTIVE | Noted: 2021-04-27

## 2022-04-11 NOTE — LETTER
April 11, 2022      Regina Nguyen  [address] [address]        Dear ,    This letter is to remind you that you are due for your follow-up Pap smear and Human Papillomavirus (HPV) test.    Please call [phone] to schedule your appointment at your earliest convenience.    If you have completed the appointment outside of the [facility] system, please have the records forwarded to our office. We will update your chart for your provider to review before your next annual wellness visit.     Thank you for choosing [facility]!      Sincerely,    Your [facility] Care Team

## 2022-06-06 DIAGNOSIS — G47.00 INSOMNIA, UNSPECIFIED TYPE: ICD-10-CM

## 2022-06-06 RX ORDER — ZOLPIDEM TARTRATE 5 MG/1
5 TABLET ORAL
Qty: 30 TABLET | Refills: 2 | Status: SHIPPED | OUTPATIENT
Start: 2022-06-06 | End: 2022-12-14

## 2022-06-15 ENCOUNTER — OFFICE VISIT (OUTPATIENT)
Dept: FAMILY MEDICINE | Facility: CLINIC | Age: 32
End: 2022-06-15
Payer: COMMERCIAL

## 2022-06-15 VITALS
WEIGHT: 143 LBS | SYSTOLIC BLOOD PRESSURE: 120 MMHG | OXYGEN SATURATION: 98 % | DIASTOLIC BLOOD PRESSURE: 70 MMHG | BODY MASS INDEX: 23.98 KG/M2 | HEART RATE: 91 BPM

## 2022-06-15 DIAGNOSIS — G47.00 INSOMNIA, UNSPECIFIED TYPE: ICD-10-CM

## 2022-06-15 DIAGNOSIS — F41.9 ANXIETY: ICD-10-CM

## 2022-06-15 DIAGNOSIS — E23.7 PITUITARY DISEASE (H): ICD-10-CM

## 2022-06-15 DIAGNOSIS — B97.7 HIGH RISK HPV INFECTION: Primary | ICD-10-CM

## 2022-06-15 PROCEDURE — 99214 OFFICE O/P EST MOD 30 MIN: CPT | Performed by: FAMILY MEDICINE

## 2022-06-15 PROCEDURE — G0145 SCR C/V CYTO,THINLAYER,RESCR: HCPCS | Performed by: FAMILY MEDICINE

## 2022-06-15 PROCEDURE — 87624 HPV HI-RISK TYP POOLED RSLT: CPT | Performed by: FAMILY MEDICINE

## 2022-06-15 ASSESSMENT — ANXIETY QUESTIONNAIRES
4. TROUBLE RELAXING: NOT AT ALL
7. FEELING AFRAID AS IF SOMETHING AWFUL MIGHT HAPPEN: NOT AT ALL
GAD7 TOTAL SCORE: 8
3. WORRYING TOO MUCH ABOUT DIFFERENT THINGS: MORE THAN HALF THE DAYS
1. FEELING NERVOUS, ANXIOUS, OR ON EDGE: MORE THAN HALF THE DAYS
2. NOT BEING ABLE TO STOP OR CONTROL WORRYING: MORE THAN HALF THE DAYS
GAD7 TOTAL SCORE: 8
8. IF YOU CHECKED OFF ANY PROBLEMS, HOW DIFFICULT HAVE THESE MADE IT FOR YOU TO DO YOUR WORK, TAKE CARE OF THINGS AT HOME, OR GET ALONG WITH OTHER PEOPLE?: SOMEWHAT DIFFICULT
7. FEELING AFRAID AS IF SOMETHING AWFUL MIGHT HAPPEN: NOT AT ALL
6. BECOMING EASILY ANNOYED OR IRRITABLE: MORE THAN HALF THE DAYS
GAD7 TOTAL SCORE: 8
5. BEING SO RESTLESS THAT IT IS HARD TO SIT STILL: NOT AT ALL

## 2022-06-15 ASSESSMENT — PAIN SCALES - GENERAL: PAINLEVEL: NO PAIN (0)

## 2022-06-15 ASSESSMENT — PATIENT HEALTH QUESTIONNAIRE - PHQ9
10. IF YOU CHECKED OFF ANY PROBLEMS, HOW DIFFICULT HAVE THESE PROBLEMS MADE IT FOR YOU TO DO YOUR WORK, TAKE CARE OF THINGS AT HOME, OR GET ALONG WITH OTHER PEOPLE: NOT DIFFICULT AT ALL
SUM OF ALL RESPONSES TO PHQ QUESTIONS 1-9: 1
SUM OF ALL RESPONSES TO PHQ QUESTIONS 1-9: 1

## 2022-06-15 NOTE — PROGRESS NOTES
Assessment/Plan:    High risk HPV infection  High risk HPV infection none HPV 16 or 18.  Repeat Pap smear with HPV cotesting and notify with results.  Monogamous relationship.  Doing well in general.  - Pap Screen with HPV - recommended age 30 - 65 years    Anxiety  YARELY-7 questionnaire 6 out of 21 and PHQ-9 questionnaire 1 out of 27.  Continues venlafaxine XR 75 mg daily.    Insomnia, unspecified type  Continues to use zolpidem 5 mg at bedtime on as-needed basis.  Still only gets a couple hours of sleep but this is her baseline.    Pituitary disease (H)  Pituitary microadenoma.  Had seen Dr. Childs on .  Patient states asymptomatic and declines further eval at this time.          Subjective:    Regina Nguyen is seen today for follow-up assessment.  High risk HPV on cotesting 2021.  Pap smear was normal at that time.  None HPV 16 or 18 noted.  Normal menstrual cycles with LMP 2022.  Monogamous relationship.  No vaginitis concerns etc.  Anxiety benefit with venlafaxine XR 75 mg daily started 2021.  Also using zolpidem 5 mg at bedtime for insomnia.  History of pituitary microadenoma and has seen endocrinologist years ago with Dr. Childs however not recent and states otherwise asymptomatic.  Comprehensive review of systems as above otherwise all negative.  Does have history of vitamin D deficiency.  Nonfasting.     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 fib   2 bros -   MGD - DM   PGD - bone CA   MGM - melanoma, emphysema ()   PGM - CVA   Mat uncle - bone CA   Surgeries: Appy ~     Past Surgical History:   Procedure Laterality Date     APPENDECTOMY      7th grade     IR BLOOD PATCH  2017        Family History    Problem Relation Age of Onset     Heart Disease Father         a. fib     Heart Disease Mother      Coronary Artery Disease Mother 44.00        a second at 50+     No Known Problems Brother      No Known Problems Daughter      No Known Problems Son      Heart Disease Maternal Grandmother      Heart Disease Maternal Grandfather      No Known Problems Brother         Past Medical History:   Diagnosis Date     Anemia      Anxiety disorder     anxiety     Cervical high risk HPV (human papillomavirus) test positive 4/27/2021    3/25/13 ASCUS, neg HPV @ 21 yo 2/4/14 ASCUS, neg HPV @ 22 yo 11/15/17 NIL 4/27/21 NIL pap, +HR HPV (not 16/18). Plan: cotest in 1 year     Disease of thyroid gland     hyperthyroidism; postpartum     Thyroiditis         Social History     Tobacco Use     Smoking status: Never Smoker     Smokeless tobacco: Never Used   Substance Use Topics     Alcohol use: Yes     Comment: Alcoholic Drinks/day: every  so often; 2-3 drinks per week     Drug use: No        Current Outpatient Medications   Medication Sig Dispense Refill     venlafaxine (EFFEXOR-XR) 75 MG 24 hr capsule Take 1 capsule (75 mg) by mouth daily 30 capsule 2     zolpidem (AMBIEN) 5 MG tablet Take 1 tablet (5 mg) by mouth nightly as needed for sleep 30 tablet 2          Objective:    Vitals:    06/15/22 1028   BP: 120/70   Pulse: 91   SpO2: 98%   Weight: 64.9 kg (143 lb)      Body mass index is 23.98 kg/m .    Alert.  No apparent distress.  Cervix appearing multiparous otherwise no cervical os discharge or cervical motion tenderness etc.  External vulva and vaginal canal normal.      [facility]  HEAD MRI WITHOUT AND WITH IV CONTRAST  5/18/2017, 10:18 PM     INDICATION: Headache, sudden, unilateral, ipsilat Paolo syndrome or carotid/vertebral dissection suspected.  TECHNIQUE: Head MRI without and with intravenous contrast.  CONTRAST: 7 mL Gadavist.  COMPARISON: 5/17/2017     FINDINGS: No acute infarct/restricted diffusion. No  mass lesion, hemorrhage, or extraaxial fluid collections. The ventricles and sulci are normal in size, shape, and position. The cerebellum is unremarkable. No signal abnormality in the brain   parenchyma.   More clearly visualized on the current examination, there is an ovoid T2 hyperintense nonenhancing lesion in the left parasagittal posterior pituitary gland measuring approximately 4.8 x 3.7 mm (craniocaudad by AP) x 6.5 mm ML. No evidence of cavernous      sinus extension. Craniocaudad. No abnormal intracranial enhancement or mass effect. The major intracranial vascular flow voids are maintained. The orbits are unremarkable. The calvarium and skull base are unremarkable. The paranasal sinuses are clear.   The mastoid air cells are clear.      IMPRESSION:  CONCLUSION:  1.  No restricted diffusion/acute infarct, space-occupying hemorrhage, or intracranial mass.  2.  4.8 x 3.7 x 6.5-mm T2 hyperintense hypoenhancing lesion within the posterior left parasagittal pituitary gland. Differential considerations include degenerative adenoma or Rathke cleft cyst. Follow up is suggested. No extension into the cavernous   sinuses.        This note has been dictated using voice recognition software and as a result may contain minor grammatical errors and unintended word substitutions.           Answers for HPI/ROS submitted by the patient on 6/15/2022  If you checked off any problems, how difficult have these problems made it for you to do your work, take care of things at home, or get along with other people?: Not difficult at all  PHQ9 TOTAL SCORE: 1  YARELY 7 TOTAL SCORE: 8  What is the reason for your visit today? : Hpv pap  How many servings of fruits and vegetables do you eat daily?: 0-1  On average, how many sweetened beverages do you drink each day (Examples: soda, juice, sweet tea, etc.  Do NOT count diet or artificially sweetened beverages)?: 1  How many minutes a day do you exercise enough to make your heart beat  faster?: 30 to 60  How many days a week do you exercise enough to make your heart beat faster?: 4  How many days per week do you miss taking your medication?: 7

## 2022-06-20 LAB
BKR LAB AP GYN ADEQUACY: NORMAL
BKR LAB AP GYN INTERPRETATION: NORMAL
BKR LAB AP HPV REFLEX: NORMAL
BKR LAB AP LMP: NORMAL
BKR LAB AP PREVIOUS ABNL DX: NORMAL
BKR LAB AP PREVIOUS ABNORMAL: NORMAL
PATH REPORT.COMMENTS IMP SPEC: NORMAL
PATH REPORT.COMMENTS IMP SPEC: NORMAL
PATH REPORT.RELEVANT HX SPEC: NORMAL

## 2022-06-22 LAB
HUMAN PAPILLOMA VIRUS 16 DNA: NEGATIVE
HUMAN PAPILLOMA VIRUS 18 DNA: NEGATIVE
HUMAN PAPILLOMA VIRUS FINAL DIAGNOSIS: ABNORMAL
HUMAN PAPILLOMA VIRUS OTHER HR: POSITIVE

## 2022-06-23 ENCOUNTER — PATIENT OUTREACH (OUTPATIENT)
Dept: FAMILY MEDICINE | Facility: CLINIC | Age: 32
End: 2022-06-23

## 2022-06-25 ENCOUNTER — HEALTH MAINTENANCE LETTER (OUTPATIENT)
Age: 32
End: 2022-06-25

## 2022-07-25 ENCOUNTER — TELEPHONE (OUTPATIENT)
Dept: FAMILY MEDICINE | Facility: CLINIC | Age: 32
End: 2022-07-25

## 2022-07-25 NOTE — TELEPHONE ENCOUNTER
Pt had to cancel her [place] due to getting her period today. Pls call pt to reschedule as she was on the phone over an hour being transferred to different numbers. Writer cannot schedule for [place].

## 2022-08-10 ENCOUNTER — OFFICE VISIT (OUTPATIENT)
Dept: FAMILY MEDICINE | Facility: CLINIC | Age: 32
End: 2022-08-10
Payer: COMMERCIAL

## 2022-08-10 VITALS
TEMPERATURE: 98.1 F | BODY MASS INDEX: 23.98 KG/M2 | HEART RATE: 79 BPM | SYSTOLIC BLOOD PRESSURE: 116 MMHG | DIASTOLIC BLOOD PRESSURE: 64 MMHG | WEIGHT: 143 LBS | OXYGEN SATURATION: 99 %

## 2022-08-10 DIAGNOSIS — Z98.890 HISTORY OF COLPOSCOPY: Primary | ICD-10-CM

## 2022-08-10 DIAGNOSIS — B96.89 BACTERIAL VAGINOSIS: ICD-10-CM

## 2022-08-10 DIAGNOSIS — N76.0 BACTERIAL VAGINOSIS: ICD-10-CM

## 2022-08-10 DIAGNOSIS — N89.8 VAGINAL DISCHARGE: ICD-10-CM

## 2022-08-10 DIAGNOSIS — B97.7 HIGH RISK HPV INFECTION: ICD-10-CM

## 2022-08-10 LAB
CLUE CELLS: PRESENT
HCG UR QL: NEGATIVE
TRICHOMONAS, WET PREP: ABNORMAL
WBC'S/HIGH POWER FIELD, WET PREP: ABNORMAL
YEAST, WET PREP: ABNORMAL

## 2022-08-10 PROCEDURE — 88360 TUMOR IMMUNOHISTOCHEM/MANUAL: CPT | Performed by: PATHOLOGY

## 2022-08-10 PROCEDURE — 87210 SMEAR WET MOUNT SALINE/INK: CPT | Performed by: FAMILY MEDICINE

## 2022-08-10 PROCEDURE — 99213 OFFICE O/P EST LOW 20 MIN: CPT | Mod: 25 | Performed by: FAMILY MEDICINE

## 2022-08-10 PROCEDURE — 81025 URINE PREGNANCY TEST: CPT | Performed by: FAMILY MEDICINE

## 2022-08-10 PROCEDURE — 88342 IMHCHEM/IMCYTCHM 1ST ANTB: CPT | Mod: 59 | Performed by: PATHOLOGY

## 2022-08-10 PROCEDURE — 88305 TISSUE EXAM BY PATHOLOGIST: CPT | Performed by: PATHOLOGY

## 2022-08-10 PROCEDURE — 57455 BIOPSY OF CERVIX W/SCOPE: CPT | Performed by: FAMILY MEDICINE

## 2022-08-10 RX ORDER — METRONIDAZOLE 500 MG/1
500 TABLET ORAL 2 TIMES DAILY
Qty: 14 TABLET | Refills: 0 | Status: SHIPPED | OUTPATIENT
Start: 2022-08-10 | End: 2022-08-17

## 2022-08-10 ASSESSMENT — PAIN SCALES - GENERAL: PAINLEVEL: NO PAIN (0)

## 2022-08-10 NOTE — PROGRESS NOTES
Assessment & Plan     History of colposcopy  Pregnancy test performed and was negative.  Discussed indication for colposcopy with patient.  Discussed procedure as well as risks of discomfort, bleeding, infection and reaction to topical solution.  Consent form was signed and will be scanned into chart.  She was placed in the dorsal lithotomy position.  Cervix visualized with speculum.  White vaginal discharge noted.  Cervix and adjacent vagina normal in appearance.  Entire squamocolumnar junction identified.  3% acetic acid solution applied in surface of the cervix.  No acetowhite changes noted.  Lugol's solution was then applied with decreased uptake noted at the 2 o'clock position and approximately from the 3:00 to 6:00 positions.  Biopsies were performed at 2:00 and 7:00 positions.  Specimens were sent to pathology.  Patient tolerated procedure well.  EBL minimal.  Hemostasis achieved with pressure and Monsel solution.  Discussed that she will experience vaginal bleeding and discharge for next several days.  May experience intermittent cramping.  Okay to use ibuprofen or Tylenol as needed.  Advised avoidance of tampons and sex for 1 week.  Notify us of any symptoms of concern such as severe abdominal pain, heavy bleeding, fevers or chills or abnormal vaginal discharge.  We will notify her of pathology results when available.  Overall impression is of LIYAH-1.  - HCG qualitative urine  - HCG qualitative urine  - Colposcopy cervix with cervix biopsy  - Surgical pathology exam    Vaginal discharge    - Wet prep - Clinic Collect    High risk HPV infection    - Colposcopy cervix with cervix biopsy  - Surgical pathology exam      Bacterial vaginosis  Prescription for Flagyl sent to pharmacy.             No follow-ups on file.    Sandrine Duran MD  [facility]    Paulina Tapia is a 31 year old, presenting for the following health issues:  colposcopy      HPI   She is seen today for colposcopy.   Recently had Pap smear performed.  Pap smear was normal but she tested positive for high-risk type HPV infection, not 16 or 18 recently had Pap smear in April 2021 that was normal but did show presence of high risk HPV infection.  Previous Pap in 2017 was normal.  She has never had colposcopy before.  We reviewed her current medications and allergies.  On review of systems she reports that she has been having some pelvic pain.  She has history of ovarian cyst.  Has also noticed some vaginal discharge.  Review of systems is otherwise negative.  No other concerns today        Review of Systems         Objective    /64 (BP Location: Left arm, Cuff Size: Adult Regular)   Pulse 79   Temp 98.1  F (36.7  C) (Oral)   Wt 64.9 kg (143 lb)   LMP 07/26/2022   SpO2 99%   BMI 23.98 kg/m    Body mass index is 23.98 kg/m .  Physical Exam   GENERAL: healthy, alert and no distress   (female): normal female external genitalia, normal urethral meatus , vaginal mucosa pink, moist, well rugated and white vaginal discharge present,  PSYCH: mentation appears normal, affect normal/bright    Results for orders placed or performed in visit on 08/10/22 (from the past 24 hour(s))   HCG qualitative urine   Result Value Ref Range    hCG Urine Qualitative Negative Negative   Wet prep - Clinic Collect    Specimen: Vagina; Swab   Result Value Ref Range    Trichomonas Absent Absent    Yeast Absent Absent    Clue Cells Present (A) Absent    WBCs/high power field 2+ (A) None                   .  ..

## 2022-08-16 LAB
PATH REPORT.COMMENTS IMP SPEC: NORMAL
PATH REPORT.FINAL DX SPEC: NORMAL
PATH REPORT.GROSS SPEC: NORMAL
PATH REPORT.MICROSCOPIC SPEC OTHER STN: NORMAL
PATH REPORT.RELEVANT HX SPEC: NORMAL
PHOTO IMAGE: NORMAL

## 2022-08-17 ENCOUNTER — TELEPHONE (OUTPATIENT)
Dept: FAMILY MEDICINE | Facility: CLINIC | Age: 32
End: 2022-08-17

## 2022-08-17 NOTE — TELEPHONE ENCOUNTER
Pt is calling with some questions regarding her recent biopsy and would like to speak with Dr. Duran or her nurse. Please advise and contact patient to discuss result concerns further.

## 2022-08-17 NOTE — TELEPHONE ENCOUNTER
Called patient to address her questions.    She wants to know about the Final Diagnosis  A(A). 7 o'clock:  -Koilocytosis, suggestive but not diagnostic of HPV effect  B(B). 2 o'clock:  -No significant abnormalities    Patient would like a call back from PCP to discuss these koilocytosis findings because she is concerned for cancer.  Elizabeth Burrows RN on 8/17/2022 at 2:27 PM

## 2022-09-07 ENCOUNTER — PATIENT OUTREACH (OUTPATIENT)
Dept: FAMILY MEDICINE | Facility: CLINIC | Age: 32
End: 2022-09-07

## 2022-09-07 NOTE — TELEPHONE ENCOUNTER
08/10/22 Markle Bx Koilocytosis, suggestive but not diagnostic of HPV effect no LIYAH Plan cotest in 1 year due 08/10/23 per guidelines

## 2022-11-19 ENCOUNTER — HEALTH MAINTENANCE LETTER (OUTPATIENT)
Age: 32
End: 2022-11-19

## 2022-12-14 DIAGNOSIS — G47.00 INSOMNIA, UNSPECIFIED TYPE: ICD-10-CM

## 2022-12-14 RX ORDER — ZOLPIDEM TARTRATE 5 MG/1
TABLET ORAL
Qty: 30 TABLET | Refills: 2 | Status: SHIPPED | OUTPATIENT
Start: 2022-12-14 | End: 2023-04-16

## 2023-07-02 ENCOUNTER — HEALTH MAINTENANCE LETTER (OUTPATIENT)
Age: 33
End: 2023-07-02

## 2023-07-13 ENCOUNTER — TELEPHONE (OUTPATIENT)
Dept: NURSING | Facility: CLINIC | Age: 33
End: 2023-07-13
Payer: COMMERCIAL

## 2023-07-13 ENCOUNTER — OFFICE VISIT (OUTPATIENT)
Dept: FAMILY MEDICINE | Facility: CLINIC | Age: 33
End: 2023-07-13
Attending: FAMILY MEDICINE
Payer: COMMERCIAL

## 2023-07-13 VITALS
TEMPERATURE: 97.6 F | SYSTOLIC BLOOD PRESSURE: 120 MMHG | DIASTOLIC BLOOD PRESSURE: 80 MMHG | WEIGHT: 142 LBS | OXYGEN SATURATION: 98 % | BODY MASS INDEX: 23.66 KG/M2 | RESPIRATION RATE: 17 BRPM | HEIGHT: 65 IN | HEART RATE: 77 BPM

## 2023-07-13 DIAGNOSIS — E23.7 PITUITARY DISEASE (H): ICD-10-CM

## 2023-07-13 DIAGNOSIS — Z00.00 ROUTINE PHYSICAL EXAMINATION: Primary | ICD-10-CM

## 2023-07-13 DIAGNOSIS — R10.31 BILATERAL LOWER ABDOMINAL PAIN: ICD-10-CM

## 2023-07-13 DIAGNOSIS — Z12.4 CERVICAL CANCER SCREENING: ICD-10-CM

## 2023-07-13 DIAGNOSIS — R10.32 BILATERAL LOWER ABDOMINAL PAIN: ICD-10-CM

## 2023-07-13 PROCEDURE — 99213 OFFICE O/P EST LOW 20 MIN: CPT | Mod: 25 | Performed by: FAMILY MEDICINE

## 2023-07-13 PROCEDURE — 88175 CYTOPATH C/V AUTO FLUID REDO: CPT | Performed by: FAMILY MEDICINE

## 2023-07-13 PROCEDURE — 99395 PREV VISIT EST AGE 18-39: CPT | Performed by: FAMILY MEDICINE

## 2023-07-13 PROCEDURE — 87624 HPV HI-RISK TYP POOLED RSLT: CPT | Performed by: FAMILY MEDICINE

## 2023-07-13 ASSESSMENT — PAIN SCALES - GENERAL: PAINLEVEL: NO PAIN (0)

## 2023-07-13 ASSESSMENT — ENCOUNTER SYMPTOMS: BREAST MASS: 0

## 2023-07-13 NOTE — TELEPHONE ENCOUNTER
Clinic Action Needed: Yes, please call pt [phone]. Okay to leave detailed message.    [facility]NA Triage Call  Presenting Problem:    Wellness visit scheduled today 3:50 pm with Dr. Neves. Pt states she needs to do a papsmear but period started today.    Asks if she needs to reschedule.    Pt has PAP tracking (see 9/7/22 notes)  Kindly advise pt and help reschedule if necessary    Mya Hernández RN/[place] Nurse Advisor

## 2023-07-13 NOTE — PROGRESS NOTES
"  Assessment/Plan:     Routine physical examination  Routine healthcare maintenance.  Preventative cares reviewed.  Annual physical exams to continue.    Bilateral lower abdominal pain  Bilateral right greater than left lower abdominal discomfort described as \"ovary pain\".  Will refer to gynecology.  Has had prior ultrasound and CT scans historically without obvious etiology.  Symptoms over past year.  Does have some scant irregular bleeding with LMP June 21, 2023 with recent 1 day spotting described.  No known history of endometriosis.  - Ob/Gyn Referral    Cervical cancer screening  Cervical cancer screening updated at 1 year interval with prior HPV high risk [phone] with NIL Pap.  - Pap Screen with HPV - recommended age 30 - 65 years    Pituitary disease (H)  Noted occasional headaches, improved.  Prior evaluation for pituitary disease as noted below.         1. Annual Physical Exam.  Encouraged healthy lifestyle habits including regular exercise, healthy eating habits, and adequate calcium and vitamin D intake.           Subjective:     Regina Nguyen is a 32 year old female who presents for an annual exam.  In general doing well.  Bilateral lower abdominal discomfort right greater than left which she feels is related to her ovaries.  Patient had 1 day spotting recently.  LMP June 21, 2023.  Sexually active.  No constipation concerns.  Pituitary disease historically noted with work-up for headaches.  Improvement in headaches described.  Comprehensive review of systems as above otherwise all negative.    Healthy Habits:   Healthy Diet: Yes  Regular Exercise: Yes  Sunscreen Use: Yes  Dental Visits Regularly: Yes  Seat Belt: Yes  Self Breast Exam Monthly:Yes    Health Maintenance reviewed - UTD.  Lipid Profile: Yes  Glucose Screen: Yes  Last Mammogram: No  Colonoscopy: N/A  Last Dexa: N/A    Immunization History   Administered Date(s) Administered     COVID-19 MONOVALENT 12+ (Pfizer) 09/15/2021, 10/11/2021     " DT (PEDS <7y) 2005     HPV Quadrivalent 10/06/2010     HepB, Unspecified 2003, 2003, 2004     Influenza Intranasal Vaccine 10/06/2010     TD,PF 7+ (Tenivac) 2021     TDAP (Adacel,Boostrix) 10/06/2010     Td, Absorbed, Pf, Adult, Lf Unspecified 2021     Immunization status: UTD.      Gynecologic History  Patient's last menstrual period was 2023.  Sexually active: Yes  Last Pap: . Results were: abnormal with high risk none HPV 16 and 9 HPV 18 with NIL Pap.      OB History    Para Term  AB Living   2 2 2 0 0 1   SAB IAB Ectopic Multiple Live Births   0 0 0 0 1      # Outcome Date GA Lbr Lito/2nd Weight Sex Delivery Anes PTL Lv   2 Term 14 39w1d 05:53 / 00:03 4.099 kg (9 lb 0.6 oz) M Vag-Spont None N FABIOLA      Complications: True knot in cord      Name: LOOSEN,MALE-NAVJOT      Apgar1: 9  Apgar5: 9   1 Term                Current Outpatient Medications   Medication Sig Dispense Refill     venlafaxine (EFFEXOR-XR) 75 MG 24 hr capsule Take 1 capsule (75 mg) by mouth daily 30 capsule 2     zolpidem (AMBIEN) 5 MG tablet TAKE ONE TABLET BY MOUTH AT BEDTIME AS NEEDED FOR SLEEP 30 tablet 5     Past Medical History:   Diagnosis Date     Anemia      Anxiety disorder     anxiety     Cervical high risk HPV (human papillomavirus) test positive 2021    3/25/13 ASCUS, neg HPV @ 23 yo 14 ASCUS, neg HPV @ 24 yo 11/15/17 NIL 21 NIL pap, +HR HPV (not 16/18). Plan: cotest in 1 year     Disease of thyroid gland     hyperthyroidism; postpartum     Thyroiditis      Past Surgical History:   Procedure Laterality Date     APPENDECTOMY      7th grade     IR BLOOD PATCH  2017     Benadryl [diphenhydramine]  Family History   Problem Relation Age of Onset     Heart Disease Father         a. fib     Heart Disease Mother      Coronary Artery Disease Mother 44.00        a second at 50+     No Known Problems Brother      No Known Problems Daughter      No Known  "Problems Son      Heart Disease Maternal Grandmother      Heart Disease Maternal Grandfather      No Known Problems Brother      Social History     Socioeconomic History     Marital status:      Spouse name: Not on file     Number of children: Not on file     Years of education: Not on file     Highest education level: Not on file   Occupational History     Not on file   Tobacco Use     Smoking status: Never     Smokeless tobacco: Never   Vaping Use     Vaping Use: Never used   Substance and Sexual Activity     Alcohol use: Yes     Comment: Alcoholic Drinks/day: every  so often; 2-3 drinks per week     Drug use: No     Sexual activity: Yes     Partners: Male     Birth control/protection: Condom   Other Topics Concern     Not on file   Social History Narrative     Not on file     Social Determinants of Health     Financial Resource Strain: Not on file   Food Insecurity: Not on file   Transportation Needs: Not on file   Physical Activity: Not on file   Stress: Not on file   Social Connections: Not on file   Intimate Partner Violence: Not on file   Housing Stability: Not on file       Review of Systems  General:  Denies problem  Eyes: Denies problem  Ears/Nose/Throat: Denies problem  Cardiovascular: Denies problem  Respiratory:  Denies problem  Gastrointestinal:  Denies problem, Genitourinary: Denies problem  Musculoskeletal:  Denies problem  Skin: Denies problem  Neurologic: Denies problem  Psychiatric: Denies problem  Endocrine: Denies problem  Heme/Lymphatic: Denies problem   Allergic/Immunologic: Denies problem       [unfilled]    Objective:        Vitals:    07/13/23 1546   BP: 120/80   Pulse: 77   Resp: 17   Temp: 97.6  F (36.4  C)   SpO2: 98%   Weight: 64.4 kg (142 lb)   Height: 1.651 m (5' 5\")   PainSc: No Pain (0)     Body mass index is 23.63 kg/m .    Physical Exam:    General Appearance: Alert, pleasant, appears stated age  Head: Normocephalic, without obvious abnormality  Eyes: PERRL, " conjunctiva/corneas clear, EOM's intact  Ears: Normal TM's and external ear canals, both ears  Nose: Nares normal, septum midline,mucosa normal, no drainage  Throat: Lips, mucosa, and tongue normal; teeth and gums normal; oropharynx is clear  Neck: Supple,without lymphadenopathy or thyromegally  Lungs: Clear to auscultation bilaterally, respirations unlabored  Breasts: Nopalpable masses, tenderness, asymmetry, or nipple discharge. No axillary or supraclavicular lymphadenopathy  Heart: Regular rate and rhythm, no murmur   Abdomen: Soft, non-tender, no masses, no organomegaly  Pelvic:normal  Extremities: Extremities with strong and symmetric pulses, no cyanosis or edema  Skin: Skin color, texture normal, no rashes or lesions  Neurologic: Normal           CT ABDOMEN PELVIS WO ORAL WO IV CONTRAST  8/14/2017 8:50 AM     INDICATION: right flank pain/back pain  TECHNIQUE: Routine CT abdomen and pelvis without oral or IV contrast. Multiplanar reformation images (MPR). Dose reduction techniques were used.  COMPARISON: None.     FINDINGS:  LUNG BASES: Negative.     ABDOMEN: Normal kidneys, no stones or hydronephrosis.  Liver, gallbladder, pancreas, spleen and adrenals appear negative.     PELVIS: No bladder or distal ureteral stones. No adnexal lesions or free fluid. Appendix is not identified but there is no inflammatory change near the cecal tip. Bowel unremarkable.     MUSCULOSKELETAL: Negative.     IMPRESSION:  CONCLUSION:  1.  Negative CT exam. No etiology for patient's symptoms identified.        US PELVIS WITH TRANSVAGINAL NON OB  8/28/2018 1:12 PM     INDICATION: Lower abdominal pain, unspecified.  TECHNIQUE: Transabdominal scans were performed. Endovaginal ultrasound was performed to better visualize the adnexa.  COMPARISON: None.      FINDINGS:  Uterus measures 8.7 x 5.7 x 4.6 cm. Normal uterus with no masses.     Endometrial thickness is 14 mm. This is thickened. The central endometrial echo complex has a  heterogeneous appearance.     Right ovary measures 2.7 x 2.5 x 2.3 cm. Normal right ovary. Normal arterial duplex.     Left ovary measures 3.4 x 2.7 x 2.8 cm. Normal left ovary. Normal arterial duplex.  Trace free fluid in the cul-de-sac.     IMPRESSION:  CONCLUSION:  1.  The central endometrial echo complex is thickened at 14 mm and heterogeneous in appearance.  2.  Trace free fluid in the cul-de-sac.          [facility]  HEAD MRI WITHOUT AND WITH IV CONTRAST  5/18/2017, 10:18 PM     INDICATION: Headache, sudden, unilateral, ipsilat Paolo syndrome or carotid/vertebral dissection suspected.  TECHNIQUE: Head MRI without and with intravenous contrast.  CONTRAST: 7 mL Gadavist.  COMPARISON: 5/17/2017     FINDINGS: No acute infarct/restricted diffusion. No mass lesion, hemorrhage, or extraaxial fluid collections. The ventricles and sulci are normal in size, shape, and position. The cerebellum is unremarkable. No signal abnormality in the brain   parenchyma.   More clearly visualized on the current examination, there is an ovoid T2 hyperintense nonenhancing lesion in the left parasagittal posterior pituitary gland measuring approximately 4.8 x 3.7 mm (craniocaudad by AP) x 6.5 mm ML. No evidence of cavernous      sinus extension. Craniocaudad. No abnormal intracranial enhancement or mass effect. The major intracranial vascular flow voids are maintained. The orbits are unremarkable. The calvarium and skull base are unremarkable. The paranasal sinuses are clear.   The mastoid air cells are clear.      IMPRESSION:  CONCLUSION:  1.  No restricted diffusion/acute infarct, space-occupying hemorrhage, or intracranial mass.  2.  4.8 x 3.7 x 6.5-mm T2 hyperintense hypoenhancing lesion within the posterior left parasagittal pituitary gland. Differential considerations include degenerative adenoma or Rathke cleft cyst. Follow up is suggested. No extension into the cavernous   sinuses.          This note has been  dictated using voice recognition software and as a result may contain minor grammatical errors and unintended word substitutions.   Answers for HPI/ROS submitted by the patient on 7/13/2023  Frequency of exercise:: 6-7 days/week  Getting at least 3 servings of Calcium per day:: Yes  Diet:: Regular (no restrictions)  Taking medications regularly:: Yes  Medication side effects:: None  Bi-annual eye exam:: NO  Dental care twice a year:: Yes  Sleep apnea or symptoms of sleep apnea:: None  pelvic pain: Yes  vaginal bleeding: No  vaginal discharge: No  tenderness: No  breast mass: No  breast discharge: No  Additional concerns today:: Yes  Duration of exercise:: 30-45 minutes

## 2023-07-21 LAB
HUMAN PAPILLOMA VIRUS 16 DNA: NEGATIVE
HUMAN PAPILLOMA VIRUS 18 DNA: NEGATIVE
HUMAN PAPILLOMA VIRUS FINAL DIAGNOSIS: NORMAL
HUMAN PAPILLOMA VIRUS OTHER HR: NEGATIVE

## 2023-09-01 ENCOUNTER — OFFICE VISIT (OUTPATIENT)
Dept: FAMILY MEDICINE | Facility: CLINIC | Age: 33
End: 2023-09-01
Payer: COMMERCIAL

## 2023-09-01 VITALS
DIASTOLIC BLOOD PRESSURE: 87 MMHG | TEMPERATURE: 98.4 F | RESPIRATION RATE: 19 BRPM | OXYGEN SATURATION: 99 % | HEART RATE: 87 BPM | SYSTOLIC BLOOD PRESSURE: 134 MMHG

## 2023-09-01 DIAGNOSIS — K12.1 ULCER OF MOUTH: ICD-10-CM

## 2023-09-01 DIAGNOSIS — J02.9 SORE THROAT: Primary | ICD-10-CM

## 2023-09-01 DIAGNOSIS — J02.0 STREPTOCOCCAL PHARYNGITIS: ICD-10-CM

## 2023-09-01 LAB — DEPRECATED S PYO AG THROAT QL EIA: POSITIVE

## 2023-09-01 PROCEDURE — 87880 STREP A ASSAY W/OPTIC: CPT | Performed by: FAMILY MEDICINE

## 2023-09-01 PROCEDURE — 99213 OFFICE O/P EST LOW 20 MIN: CPT | Performed by: FAMILY MEDICINE

## 2023-09-01 RX ORDER — CEPHALEXIN 500 MG/1
500 CAPSULE ORAL 2 TIMES DAILY
Qty: 20 CAPSULE | Refills: 0 | Status: SHIPPED | OUTPATIENT
Start: 2023-09-01 | End: 2023-09-11

## 2023-09-01 NOTE — PROGRESS NOTES
(J02.9) Sore throat  (primary encounter diagnosis)  Comment:   Rule out strep.  Plan: Streptococcus A Rapid Screen w/Reflex to PCR -         Clinic Collect            (K12.1) Ulcer of mouth  Comment:   Nonspecific ulcerations, few in area of hard palate.  Plan:   Symptomatic measures discussed.      (J02.0) Streptococcal pharyngitis  Comment:   Plan: cephALEXin (KEFLEX) 500 MG capsule                  CHIEF COMPLAINT    Sore throat.      HISTORY    She has had some mouth sores for about 4 days.  Location is roof of mouth.  Also some sore throat in the last 2 days.  No fevers.  No congestion.      REVIEW OF SYSTEMS    Unremarkable except as above.      EXAM  /87   Pulse 87   Temp 98.4  F (36.9  C) (Tympanic)   Resp 19   LMP 07/13/2023   SpO2 99%     Tympanic membranes normal.  Oropharynx -4 or 5 small reddish ulcerations roof of mouth over hard palate.  Posterior pharynx is not especially inflamed.  No significant cervical adenopathy.  No observed congestion.      Results for orders placed or performed in visit on 09/01/23   Streptococcus A Rapid Screen w/Reflex to PCR - Clinic Collect     Status: Abnormal    Specimen: Throat; Swab   Result Value Ref Range    Group A Strep antigen Positive (A) Negative

## 2023-09-14 ENCOUNTER — E-VISIT (OUTPATIENT)
Dept: URGENT CARE | Facility: CLINIC | Age: 33
End: 2023-09-14
Payer: COMMERCIAL

## 2023-09-14 DIAGNOSIS — B30.9 VIRAL CONJUNCTIVITIS: Primary | ICD-10-CM

## 2023-09-14 PROCEDURE — 99421 OL DIG E/M SVC 5-10 MIN: CPT | Performed by: PREVENTIVE MEDICINE

## 2023-09-14 RX ORDER — EPINASTINE HCL 0.05 %
DROPS OPHTHALMIC (EYE)
Qty: 5 ML | Refills: 0 | Status: SHIPPED | OUTPATIENT
Start: 2023-09-14 | End: 2023-11-09

## 2023-09-14 NOTE — PATIENT INSTRUCTIONS
Thank you for choosing us for your care. I have placed an order for a prescription so that you can start treatment. View your full visit summary for details by clicking on the link below. Your pharmacist will able to address any questions you may have about the medication.     If you re not feeling better within 2-3 days, please schedule an appointment.  You can schedule an appointment right here in [facility], or call [phone]  If the visit is for the same symptoms as your eVisit, we ll refund the cost of your eVisit if seen within seven days.    Pinkeye: Care Instructions  Overview     Pinkeye is redness and swelling of the eye surface and the conjunctiva (the lining of the eyelid and the covering of the white part of the eye). Pinkeye is also called conjunctivitis. Pinkeye is often caused by infection with bacteria or a virus. Dry air, allergies, smoke, and chemicals are other common causes.  Pinkeye often gets better on its own in 7 to 10 days. Antibiotics only help if the pinkeye is caused by bacteria. Pinkeye caused by infection spreads easily. If an allergy or chemical is causing pinkeye, it will not go away unless you can avoid whatever is causing it.  Follow-up care is a key part of your treatment and safety. Be sure to make and go to all appointments, and call your doctor if you are having problems. It's also a good idea to know your test results and keep a list of the medicines you take.  How can you care for yourself at home?  Wash your hands often. Always wash them before and after you treat pinkeye or touch your eyes or face.  Use moist cotton or a clean, wet cloth to remove crust. Wipe from the inside corner of the eye to the outside. Use a clean part of the cloth for each wipe.  Put cold or warm wet cloths on your eye a few times a day if the eye hurts.  Do not wear contact lenses or eye makeup until the pinkeye is gone. Throw away any eye makeup you were using when you got pinkeye. Clean your  "contacts and storage case. If you wear disposable contacts, use a new pair when your eye has cleared and it is safe to wear contacts again.  If the doctor gave you antibiotic ointment or eyedrops, use them as directed. Use the medicine for as long as instructed, even if your eye starts looking better soon. Keep the bottle tip clean, and do not let it touch the eye area.  To put in eyedrops or ointment:  Tilt your head back, and pull your lower eyelid down with one finger.  Drop or squirt the medicine inside the lower lid.  Close your eye for 30 to 60 seconds to let the drops or ointment move around.  Do not touch the ointment or dropper tip to your eyelashes or any other surface.  Do not share towels, pillows, or washcloths while you have pinkeye.  When should you call for help?   Call your doctor now or seek immediate medical care if:    You have pain in your eye, not just irritation on the surface.     You have a change in vision or loss of vision.     You have an increase in discharge from the eye.     Your eye has not started to improve or begins to get worse within 48 hours after you start using antibiotics.     Pinkeye lasts longer than 7 days.   Watch closely for changes in your health, and be sure to contact your doctor if you have any problems.  Where can you learn more?  Go to https://www.[employer].net/patiented  Enter Y392 in the search box to learn more about \"Pinkeye: Care Instructions.\"  Current as of: October 12, 2022               Content Version: 13.7    2131-1686 [employer].   Care instructions adapted under license by your healthcare professional. If you have questions about a medical condition or this instruction, always ask your healthcare professional. [employer] disclaims any warranty or liability for your use of this information.      "

## 2023-10-13 ENCOUNTER — TRANSFERRED RECORDS (OUTPATIENT)
Dept: HEALTH INFORMATION MANAGEMENT | Facility: CLINIC | Age: 33
End: 2023-10-13
Payer: COMMERCIAL

## 2023-10-26 DIAGNOSIS — G47.00 INSOMNIA, UNSPECIFIED TYPE: ICD-10-CM

## 2023-10-26 RX ORDER — ZOLPIDEM TARTRATE 5 MG/1
TABLET ORAL
Qty: 30 TABLET | Refills: 5 | Status: SHIPPED | OUTPATIENT
Start: 2023-10-26 | End: 2024-05-21

## 2023-11-09 ENCOUNTER — OFFICE VISIT (OUTPATIENT)
Dept: FAMILY MEDICINE | Facility: CLINIC | Age: 33
End: 2023-11-09
Payer: COMMERCIAL

## 2023-11-09 VITALS
BODY MASS INDEX: 23.74 KG/M2 | TEMPERATURE: 98.1 F | RESPIRATION RATE: 16 BRPM | HEIGHT: 65 IN | WEIGHT: 142.5 LBS | HEART RATE: 60 BPM | DIASTOLIC BLOOD PRESSURE: 84 MMHG | SYSTOLIC BLOOD PRESSURE: 135 MMHG | OXYGEN SATURATION: 98 %

## 2023-11-09 DIAGNOSIS — N94.89 UTERINE PAIN: ICD-10-CM

## 2023-11-09 DIAGNOSIS — Z01.818 PREOP GENERAL PHYSICAL EXAM: Primary | ICD-10-CM

## 2023-11-09 DIAGNOSIS — F51.01 PRIMARY INSOMNIA: ICD-10-CM

## 2023-11-09 PROCEDURE — 99214 OFFICE O/P EST MOD 30 MIN: CPT | Performed by: FAMILY MEDICINE

## 2023-11-09 ASSESSMENT — PAIN SCALES - GENERAL: PAINLEVEL: NO PAIN (0)

## 2023-11-09 NOTE — PROGRESS NOTES
[facility]  [address] [place] [id number]  Phone: [phone]  Fax: [phone]  Primary Provider: Valerio Neves  Pre-op Performing Provider: PRACHI BALTAZAR      PREOPERATIVE EVALUATION:  Today's date: 11/9/2023    Regina is a 33 year old female who presents for a preoperative evaluation.      11/9/2023     2:29 PM   Additional Questions   Roomed by [facility], Visit Facilitator       Surgical Information:  Surgery/Procedure: Fallopian Tube Removal, Laproscopy, Hysteroscopy  Surgery Location: [facility]  Surgeon: MORAIMA Browning  Surgery Date: 11/15/2023  Time of Surgery: unknown  Where patient plans to recover: At home with family  Fax number for surgical facility: Note does not need to be faxed, will be available electronically in Epic.    Assessment & Plan     The proposed surgical procedure is considered INTERMEDIATE risk.    Preop general physical exam:  Patient is very low risk for cardiovascular complications.  Patient may proceed with hysterectomy and tubal ligation without need for further testing.    Uterine pain:  Agree with surgery due to significant and chronic pain.    Primary insomnia  Controlled, continue Ambien.            - No identified additional risk factors other than previously addressed    Antiplatelet or Anticoagulation Medication Instructions:   - Patient is on no antiplatelet or anticoagulation medications.    Additional Medication Instructions:  Patient is to take all scheduled medications on the day of surgery    RECOMMENDATION:  APPROVAL GIVEN to proceed with proposed procedure, without further diagnostic evaluation.            Subjective       HPI related to upcoming procedure: Uterine pain        11/9/2023    10:00 AM   Preop Questions   1. Have you ever had a heart attack or stroke? No   2. Have you ever had surgery on your heart or blood vessels, such as a stent placement, a coronary artery bypass, or surgery on an artery  in your head, neck, heart, or legs? No   3. Do you have chest pain with activity? No   4. Do you have a history of  heart failure? No   5. Do you currently have a cold, bronchitis or symptoms of other infection? No   6. Do you have a cough, shortness of breath, or wheezing? No   7. Do you or anyone in your family have previous history of blood clots? No   8. Do you or does anyone in your family have a serious bleeding problem such as prolonged bleeding following surgeries or cuts? No   9. Have you ever had problems with anemia or been told to take iron pills? No   10. Have you had any abnormal blood loss such as black, tarry or bloody stools, or abnormal vaginal bleeding? No   11. Have you ever had a blood transfusion? No   12. Are you willing to have a blood transfusion if it is medically needed before, during, or after your surgery? Yes   13. Have you or any of your relatives ever had problems with anesthesia? No   14. Do you have sleep apnea, excessive snoring or daytime drowsiness? No   15. Do you have any artifical heart valves or other implanted medical devices like a pacemaker, defibrillator, or continuous glucose monitor? No   16. Do you have artificial joints? No   17. Are you allergic to latex? No   18. Is there any chance that you may be pregnant? No       Health Care Directive:  Patient does not have a Health Care Directive or Living Will: Discussed advance care planning with patient; information given to patient to review.    Preoperative Review of :   reviewed - controlled substances reflected in medication list.          Review of Systems  CONSTITUTIONAL: NEGATIVE for fever, chills, change in weight  INTEGUMENTARY/SKIN: NEGATIVE for worrisome rashes, moles or lesions  EYES: NEGATIVE for vision changes or irritation  ENT/MOUTH: NEGATIVE for ear, mouth and throat problems  RESP: NEGATIVE for significant cough or SOB  CV: NEGATIVE for chest pain, palpitations or peripheral edema  GI: NEGATIVE for  nausea, abdominal pain, heartburn, or change in bowel habits  : NEGATIVE for frequency, dysuria, or hematuria  MUSCULOSKELETAL: NEGATIVE for significant arthralgias or myalgia  NEURO: NEGATIVE for weakness, dizziness or paresthesias  ENDOCRINE: NEGATIVE for temperature intolerance, skin/hair changes  HEME: NEGATIVE for bleeding problems  PSYCHIATRIC: NEGATIVE for changes in mood or affect    Patient Active Problem List    Diagnosis Date Noted    Cervical high risk HPV (human papillomavirus) test positive 04/27/2021     Priority: Medium     3/25/13 ASCUS, neg HPV @ 25 yo  2/4/14 ASCUS, neg HPV @ 22 yo  11/15/17 NIL  4/27/21 NIL pap, +HR HPV (not 16/18). Plan: cotest in 1 year  06/15/22 NIL Pap, + HR HPV (not 16 or 18) Plan [place] due bef 09/15/22.   06/23/22 Pt notified by phone.   08/10/22 [place] Koilocytosis, suggestive but not diagnostic of HPV effect no LIYAH Plan cotest in 1 year due 08/10/23 per guidelines  07/13/23 NIL Pap, Neg HR HPV Plan cotest in 3 years       Pituitary disease (H24) 05/22/2017     Priority: Medium    Intractable headache 05/19/2017     Priority: Medium    Headache 05/18/2017     Priority: Medium    Positional headache 05/17/2017     Priority: Medium    Hearing loss 05/17/2017     Priority: Medium    Anxiety      Priority: Medium     Created by Conversion  Replacement Utility updated for latest IMO load        Hair loss 09/03/2015     Priority: Medium    Thyroiditis 02/19/2015     Priority: Medium     postpartum        Tachycardia 02/12/2015     Priority: Medium      Past Medical History:   Diagnosis Date    Anemia     Anxiety disorder     anxiety    Cervical high risk HPV (human papillomavirus) test positive 4/27/2021    3/25/13 ASCUS, neg HPV @ 23 yo 2/4/14 ASCUS, neg HPV @ 22 yo 11/15/17 NIL 4/27/21 NIL pap, +HR HPV (not 16/18). Plan: cotest in 1 year    Disease of thyroid gland     hyperthyroidism; postpartum    Thyroiditis      Past Surgical History:   Procedure Laterality Date     APPENDECTOMY      7th grade    IR BLOOD PATCH  5/20/2017     Current Outpatient Medications   Medication Sig Dispense Refill    zolpidem (AMBIEN) 5 MG tablet TAKE ONE TABLET BY MOUTH AT BEDTIME AS NEEDED FOR SLEEP 30 tablet 5    epinastine HCl (ELESTAT) 0.05 % SOLN 1 drop in affected eye(s) twice a day. 5 mL 0    venlafaxine (EFFEXOR-XR) 75 MG 24 hr capsule Take 1 capsule (75 mg) by mouth daily (Patient not taking: Reported on 9/1/2023) 30 capsule 2       Allergies   Allergen Reactions    Benadryl [Diphenhydramine] Unknown        Social History     Tobacco Use    Smoking status: Never     Passive exposure: Never    Smokeless tobacco: Never   Substance Use Topics    Alcohol use: Yes     Comment: Alcoholic Drinks/day: every  so often; 2-3 drinks per week     Family History   Problem Relation Age of Onset    Heart Disease Father         a. fib    Heart Disease Mother     Coronary Artery Disease Mother 44.00        a second at 50+    No Known Problems Brother     No Known Problems Daughter     No Known Problems Son     Heart Disease Maternal Grandmother     Heart Disease Maternal Grandfather     No Known Problems Brother      History   Drug Use No         Objective     LMP 10/30/2023 (Approximate)     Physical Exam    GENERAL APPEARANCE: healthy, alert and no distress     EYES: EOMI, PERRL     HENT: ear canals and TM's normal and nose and mouth without ulcers or lesions     NECK: no adenopathy, no asymmetry, masses, or scars and thyroid normal to palpation     RESP: lungs clear to auscultation - no rales, rhonchi or wheezes     CV: regular rates and rhythm, normal S1 S2, no S3 or S4 and no murmur, click or rub     ABDOMEN:  soft, nontender, no HSM or masses and bowel sounds normal     MS: extremities normal- no gross deformities noted, no evidence of inflammation in joints, FROM in all extremities.     SKIN: no suspicious lesions or rashes     NEURO: Normal strength and tone, sensory exam grossly normal, mentation  "intact and speech normal     PSYCH: mentation appears normal. and affect normal/bright     LYMPHATICS: No cervical adenopathy    No results for input(s): \"HGB\", \"PLT\", \"INR\", \"NA\", \"POTASSIUM\", \"CR\", \"A1C\" in the last 86439 hours.     Diagnostics:  No labs were ordered during this visit.   No EKG required, no history of coronary heart disease, significant arrhythmia, peripheral arterial disease or other structural heart disease.    Revised Cardiac Risk Index (RCRI):  The patient has the following serious cardiovascular risks for perioperative complications:   - No serious cardiac risks = 0 points     RCRI Interpretation: 0 points: Class I (very low risk - 0.4% complication rate)         Signed Electronically by: Baltazar Skaggs MD  Copy of this evaluation report is provided to requesting physician.      "

## 2023-11-09 NOTE — H&P (VIEW-ONLY)
[facility]  [address] [place] [id number]  Phone: [phone]  Fax: [phone]  Primary Provider: Valerio Neves  Pre-op Performing Provider: PRACHI BALTAZAR      PREOPERATIVE EVALUATION:  Today's date: 11/9/2023    Regina is a 33 year old female who presents for a preoperative evaluation.      11/9/2023     2:29 PM   Additional Questions   Roomed by [facility], Visit Facilitator       Surgical Information:  Surgery/Procedure: Fallopian Tube Removal, Laproscopy, Hysteroscopy  Surgery Location: [facility]  Surgeon: MORAIMA Browning  Surgery Date: 11/15/2023  Time of Surgery: unknown  Where patient plans to recover: At home with family  Fax number for surgical facility: Note does not need to be faxed, will be available electronically in Epic.    Assessment & Plan     The proposed surgical procedure is considered INTERMEDIATE risk.    Preop general physical exam:  Patient is very low risk for cardiovascular complications.  Patient may proceed with hysterectomy and tubal ligation without need for further testing.    Uterine pain:  Agree with surgery due to significant and chronic pain.    Primary insomnia  Controlled, continue Ambien.            - No identified additional risk factors other than previously addressed    Antiplatelet or Anticoagulation Medication Instructions:   - Patient is on no antiplatelet or anticoagulation medications.    Additional Medication Instructions:  Patient is to take all scheduled medications on the day of surgery    RECOMMENDATION:  APPROVAL GIVEN to proceed with proposed procedure, without further diagnostic evaluation.            Subjective       HPI related to upcoming procedure: Uterine pain        11/9/2023    10:00 AM   Preop Questions   1. Have you ever had a heart attack or stroke? No   2. Have you ever had surgery on your heart or blood vessels, such as a stent placement, a coronary artery bypass, or surgery on an artery  in your head, neck, heart, or legs? No   3. Do you have chest pain with activity? No   4. Do you have a history of  heart failure? No   5. Do you currently have a cold, bronchitis or symptoms of other infection? No   6. Do you have a cough, shortness of breath, or wheezing? No   7. Do you or anyone in your family have previous history of blood clots? No   8. Do you or does anyone in your family have a serious bleeding problem such as prolonged bleeding following surgeries or cuts? No   9. Have you ever had problems with anemia or been told to take iron pills? No   10. Have you had any abnormal blood loss such as black, tarry or bloody stools, or abnormal vaginal bleeding? No   11. Have you ever had a blood transfusion? No   12. Are you willing to have a blood transfusion if it is medically needed before, during, or after your surgery? Yes   13. Have you or any of your relatives ever had problems with anesthesia? No   14. Do you have sleep apnea, excessive snoring or daytime drowsiness? No   15. Do you have any artifical heart valves or other implanted medical devices like a pacemaker, defibrillator, or continuous glucose monitor? No   16. Do you have artificial joints? No   17. Are you allergic to latex? No   18. Is there any chance that you may be pregnant? No       Health Care Directive:  Patient does not have a Health Care Directive or Living Will: Discussed advance care planning with patient; information given to patient to review.    Preoperative Review of :   reviewed - controlled substances reflected in medication list.          Review of Systems  CONSTITUTIONAL: NEGATIVE for fever, chills, change in weight  INTEGUMENTARY/SKIN: NEGATIVE for worrisome rashes, moles or lesions  EYES: NEGATIVE for vision changes or irritation  ENT/MOUTH: NEGATIVE for ear, mouth and throat problems  RESP: NEGATIVE for significant cough or SOB  CV: NEGATIVE for chest pain, palpitations or peripheral edema  GI: NEGATIVE for  nausea, abdominal pain, heartburn, or change in bowel habits  : NEGATIVE for frequency, dysuria, or hematuria  MUSCULOSKELETAL: NEGATIVE for significant arthralgias or myalgia  NEURO: NEGATIVE for weakness, dizziness or paresthesias  ENDOCRINE: NEGATIVE for temperature intolerance, skin/hair changes  HEME: NEGATIVE for bleeding problems  PSYCHIATRIC: NEGATIVE for changes in mood or affect    Patient Active Problem List    Diagnosis Date Noted    Cervical high risk HPV (human papillomavirus) test positive 04/27/2021     Priority: Medium     3/25/13 ASCUS, neg HPV @ 23 yo  2/4/14 ASCUS, neg HPV @ 24 yo  11/15/17 NIL  4/27/21 NIL pap, +HR HPV (not 16/18). Plan: cotest in 1 year  06/15/22 NIL Pap, + HR HPV (not 16 or 18) Plan [place] due bef 09/15/22.   06/23/22 Pt notified by phone.   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     Priority: Medium    Intractable headache 05/19/2017     Priority: Medium    Headache 05/18/2017     Priority: Medium    Positional headache 05/17/2017     Priority: Medium    Hearing loss 05/17/2017     Priority: Medium    Anxiety      Priority: Medium     Created by Conversion  Replacement Utility updated for latest IMO load        Hair loss 09/03/2015     Priority: Medium    Thyroiditis 02/19/2015     Priority: Medium     postpartum        Tachycardia 02/12/2015     Priority: Medium      Past Medical History:   Diagnosis Date    Anemia     Anxiety disorder     anxiety    Cervical high risk HPV (human papillomavirus) test positive 4/27/2021    3/25/13 ASCUS, neg HPV @ 23 yo 2/4/14 ASCUS, neg HPV @ 24 yo 11/15/17 NIL 4/27/21 NIL pap, +HR HPV (not 16/18). Plan: cotest in 1 year    Disease of thyroid gland     hyperthyroidism; postpartum    Thyroiditis      Past Surgical History:   Procedure Laterality Date     APPENDECTOMY      7th grade    IR BLOOD PATCH  5/20/2017     Current Outpatient Medications   Medication Sig Dispense Refill    zolpidem (AMBIEN) 5 MG tablet TAKE ONE TABLET BY MOUTH AT BEDTIME AS NEEDED FOR SLEEP 30 tablet 5    epinastine HCl (ELESTAT) 0.05 % SOLN 1 drop in affected eye(s) twice a day. 5 mL 0    venlafaxine (EFFEXOR-XR) 75 MG 24 hr capsule Take 1 capsule (75 mg) by mouth daily (Patient not taking: Reported on 9/1/2023) 30 capsule 2       Allergies   Allergen Reactions    Benadryl [Diphenhydramine] Unknown        Social History     Tobacco Use    Smoking status: Never     Passive exposure: Never    Smokeless tobacco: Never   Substance Use Topics    Alcohol use: Yes     Comment: Alcoholic Drinks/day: every  so often; 2-3 drinks per week     Family History   Problem Relation Age of Onset    Heart Disease Father         a. fib    Heart Disease Mother     Coronary Artery Disease Mother 44.00        a second at 50+    No Known Problems Brother     No Known Problems Daughter     No Known Problems Son     Heart Disease Maternal Grandmother     Heart Disease Maternal Grandfather     No Known Problems Brother      History   Drug Use No         Objective     LMP 10/30/2023 (Approximate)     Physical Exam    GENERAL APPEARANCE: healthy, alert and no distress     EYES: EOMI, PERRL     HENT: ear canals and TM's normal and nose and mouth without ulcers or lesions     NECK: no adenopathy, no asymmetry, masses, or scars and thyroid normal to palpation     RESP: lungs clear to auscultation - no rales, rhonchi or wheezes     CV: regular rates and rhythm, normal S1 S2, no S3 or S4 and no murmur, click or rub     ABDOMEN:  soft, nontender, no HSM or masses and bowel sounds normal     MS: extremities normal- no gross deformities noted, no evidence of inflammation in joints, FROM in all extremities.     SKIN: no suspicious lesions or rashes     NEURO: Normal strength and tone, sensory exam grossly normal, mentation  "intact and speech normal     PSYCH: mentation appears normal. and affect normal/bright     LYMPHATICS: No cervical adenopathy    No results for input(s): \"HGB\", \"PLT\", \"INR\", \"NA\", \"POTASSIUM\", \"CR\", \"A1C\" in the last 81392 hours.     Diagnostics:  No labs were ordered during this visit.   No EKG required, no history of coronary heart disease, significant arrhythmia, peripheral arterial disease or other structural heart disease.    Revised Cardiac Risk Index (RCRI):  The patient has the following serious cardiovascular risks for perioperative complications:   - No serious cardiac risks = 0 points     RCRI Interpretation: 0 points: Class I (very low risk - 0.4% complication rate)         Signed Electronically by: Baltazar Skaggs MD  Copy of this evaluation report is provided to requesting physician.      "

## 2023-11-14 ENCOUNTER — ANESTHESIA EVENT (OUTPATIENT)
Dept: SURGERY | Facility: AMBULATORY SURGERY CENTER | Age: 33
End: 2023-11-14
Payer: COMMERCIAL

## 2023-11-15 ENCOUNTER — HOSPITAL ENCOUNTER (OUTPATIENT)
Facility: AMBULATORY SURGERY CENTER | Age: 33
Discharge: HOME | End: 2023-11-15
Attending: OBSTETRICS & GYNECOLOGY
Payer: COMMERCIAL

## 2023-11-15 ENCOUNTER — ANESTHESIA (OUTPATIENT)
Dept: SURGERY | Facility: AMBULATORY SURGERY CENTER | Age: 33
End: 2023-11-15
Payer: COMMERCIAL

## 2023-11-15 VITALS
SYSTOLIC BLOOD PRESSURE: 120 MMHG | OXYGEN SATURATION: 95 % | RESPIRATION RATE: 16 BRPM | DIASTOLIC BLOOD PRESSURE: 75 MMHG | BODY MASS INDEX: 23.95 KG/M2 | HEART RATE: 76 BPM | WEIGHT: 142 LBS | TEMPERATURE: 97.5 F

## 2023-11-15 DIAGNOSIS — N84.0 POLYP, CORPUS UTERI: ICD-10-CM

## 2023-11-15 DIAGNOSIS — R10.2 PELVIC PAIN: ICD-10-CM

## 2023-11-15 LAB
HCG UR QL: NEGATIVE
HGB BLD-MCNC: 14.2 G/DL
INTERNAL QC OK POCT: NORMAL
POCT KIT EXPIRATION DATE: NORMAL
POCT KIT LOT NUMBER: NORMAL

## 2023-11-15 RX ORDER — OXYCODONE HYDROCHLORIDE 10 MG/1
10 TABLET ORAL
Status: DISCONTINUED | OUTPATIENT
Start: 2023-11-15 | End: 2023-11-16 | Stop reason: HOSPADM

## 2023-11-15 RX ORDER — ONDANSETRON 4 MG/1
4 TABLET, ORALLY DISINTEGRATING ORAL EVERY 30 MIN PRN
Status: DISCONTINUED | OUTPATIENT
Start: 2023-11-15 | End: 2023-11-16 | Stop reason: HOSPADM

## 2023-11-15 RX ORDER — ONDANSETRON 2 MG/ML
INJECTION INTRAMUSCULAR; INTRAVENOUS PRN
Status: DISCONTINUED | OUTPATIENT
Start: 2023-11-15 | End: 2023-11-15

## 2023-11-15 RX ORDER — SODIUM CHLORIDE, SODIUM LACTATE, POTASSIUM CHLORIDE, CALCIUM CHLORIDE 600; 310; 30; 20 MG/100ML; MG/100ML; MG/100ML; MG/100ML
INJECTION, SOLUTION INTRAVENOUS CONTINUOUS
Status: DISCONTINUED | OUTPATIENT
Start: 2023-11-15 | End: 2023-11-16 | Stop reason: HOSPADM

## 2023-11-15 RX ORDER — IBUPROFEN 800 MG/1
800 TABLET, FILM COATED ORAL ONCE
Status: DISCONTINUED | OUTPATIENT
Start: 2023-11-15 | End: 2023-11-16 | Stop reason: HOSPADM

## 2023-11-15 RX ORDER — LIDOCAINE HYDROCHLORIDE 20 MG/ML
INJECTION, SOLUTION INFILTRATION; PERINEURAL PRN
Status: DISCONTINUED | OUTPATIENT
Start: 2023-11-15 | End: 2023-11-15

## 2023-11-15 RX ORDER — MEPERIDINE HYDROCHLORIDE 25 MG/ML
12.5 INJECTION INTRAMUSCULAR; INTRAVENOUS; SUBCUTANEOUS EVERY 5 MIN PRN
Status: DISCONTINUED | OUTPATIENT
Start: 2023-11-15 | End: 2023-11-16 | Stop reason: HOSPADM

## 2023-11-15 RX ORDER — KETOROLAC TROMETHAMINE 15 MG/ML
15 INJECTION, SOLUTION INTRAMUSCULAR; INTRAVENOUS
Status: DISCONTINUED | OUTPATIENT
Start: 2023-11-15 | End: 2023-11-16 | Stop reason: HOSPADM

## 2023-11-15 RX ORDER — NEOSTIGMINE METHYLSULFATE 1 MG/ML
VIAL (ML) INJECTION PRN
Status: DISCONTINUED | OUTPATIENT
Start: 2023-11-15 | End: 2023-11-15

## 2023-11-15 RX ORDER — BUPIVACAINE HYDROCHLORIDE 2.5 MG/ML
INJECTION, SOLUTION INFILTRATION; PERINEURAL PRN
Status: DISCONTINUED | OUTPATIENT
Start: 2023-11-15 | End: 2023-11-15 | Stop reason: HOSPADM

## 2023-11-15 RX ORDER — ACETAMINOPHEN 325 MG/1
975 TABLET ORAL ONCE
Status: DISCONTINUED | OUTPATIENT
Start: 2023-11-15 | End: 2023-11-16 | Stop reason: HOSPADM

## 2023-11-15 RX ORDER — FENTANYL CITRATE 50 UG/ML
INJECTION, SOLUTION INTRAMUSCULAR; INTRAVENOUS PRN
Status: DISCONTINUED | OUTPATIENT
Start: 2023-11-15 | End: 2023-11-15

## 2023-11-15 RX ORDER — FENTANYL CITRATE 0.05 MG/ML
25 INJECTION, SOLUTION INTRAMUSCULAR; INTRAVENOUS EVERY 5 MIN PRN
Status: DISCONTINUED | OUTPATIENT
Start: 2023-11-15 | End: 2023-11-16 | Stop reason: HOSPADM

## 2023-11-15 RX ORDER — HYDROMORPHONE HCL IN WATER/PF 6 MG/30 ML
0.2 PATIENT CONTROLLED ANALGESIA SYRINGE INTRAVENOUS EVERY 5 MIN PRN
Status: DISCONTINUED | OUTPATIENT
Start: 2023-11-15 | End: 2023-11-16 | Stop reason: HOSPADM

## 2023-11-15 RX ORDER — HYDROMORPHONE HCL IN WATER/PF 6 MG/30 ML
0.4 PATIENT CONTROLLED ANALGESIA SYRINGE INTRAVENOUS EVERY 5 MIN PRN
Status: DISCONTINUED | OUTPATIENT
Start: 2023-11-15 | End: 2023-11-16 | Stop reason: HOSPADM

## 2023-11-15 RX ORDER — OXYCODONE HYDROCHLORIDE 5 MG/1
5 TABLET ORAL EVERY 6 HOURS PRN
Qty: 12 TABLET | Refills: 0 | Status: SHIPPED | OUTPATIENT
Start: 2023-11-15 | End: 2023-11-18

## 2023-11-15 RX ORDER — DEXAMETHASONE SODIUM PHOSPHATE 4 MG/ML
INJECTION, SOLUTION INTRA-ARTICULAR; INTRALESIONAL; INTRAMUSCULAR; INTRAVENOUS; SOFT TISSUE PRN
Status: DISCONTINUED | OUTPATIENT
Start: 2023-11-15 | End: 2023-11-15

## 2023-11-15 RX ORDER — ACETAMINOPHEN 325 MG/1
975 TABLET ORAL ONCE
Status: COMPLETED | OUTPATIENT
Start: 2023-11-15 | End: 2023-11-15

## 2023-11-15 RX ORDER — ONDANSETRON 2 MG/ML
4 INJECTION INTRAMUSCULAR; INTRAVENOUS EVERY 30 MIN PRN
Status: DISCONTINUED | OUTPATIENT
Start: 2023-11-15 | End: 2023-11-16 | Stop reason: HOSPADM

## 2023-11-15 RX ORDER — OXYCODONE HYDROCHLORIDE 5 MG/1
5 TABLET ORAL
Status: COMPLETED | OUTPATIENT
Start: 2023-11-15 | End: 2023-11-15

## 2023-11-15 RX ORDER — GLYCOPYRROLATE 0.2 MG/ML
INJECTION, SOLUTION INTRAMUSCULAR; INTRAVENOUS PRN
Status: DISCONTINUED | OUTPATIENT
Start: 2023-11-15 | End: 2023-11-15

## 2023-11-15 RX ORDER — FENTANYL CITRATE 0.05 MG/ML
50 INJECTION, SOLUTION INTRAMUSCULAR; INTRAVENOUS EVERY 5 MIN PRN
Status: DISCONTINUED | OUTPATIENT
Start: 2023-11-15 | End: 2023-11-16 | Stop reason: HOSPADM

## 2023-11-15 RX ORDER — LIDOCAINE 40 MG/G
CREAM TOPICAL
Status: DISCONTINUED | OUTPATIENT
Start: 2023-11-15 | End: 2023-11-16 | Stop reason: HOSPADM

## 2023-11-15 RX ORDER — IBUPROFEN 600 MG/1
600 TABLET, FILM COATED ORAL EVERY 6 HOURS PRN
Qty: 30 TABLET | Refills: 1 | Status: SHIPPED | OUTPATIENT
Start: 2023-11-15

## 2023-11-15 RX ORDER — PROPOFOL 10 MG/ML
INJECTION, EMULSION INTRAVENOUS CONTINUOUS PRN
Status: DISCONTINUED | OUTPATIENT
Start: 2023-11-15 | End: 2023-11-15

## 2023-11-15 RX ORDER — KETOROLAC TROMETHAMINE 30 MG/ML
INJECTION, SOLUTION INTRAMUSCULAR; INTRAVENOUS PRN
Status: DISCONTINUED | OUTPATIENT
Start: 2023-11-15 | End: 2023-11-15

## 2023-11-15 RX ORDER — PROPOFOL 10 MG/ML
INJECTION, EMULSION INTRAVENOUS PRN
Status: DISCONTINUED | OUTPATIENT
Start: 2023-11-15 | End: 2023-11-15

## 2023-11-15 RX ADMIN — PROPOFOL 40 MG: 10 INJECTION, EMULSION INTRAVENOUS at 09:17

## 2023-11-15 RX ADMIN — Medication 30 MG: at 08:56

## 2023-11-15 RX ADMIN — LIDOCAINE HYDROCHLORIDE 3 ML: 20 INJECTION, SOLUTION INFILTRATION; PERINEURAL at 08:56

## 2023-11-15 RX ADMIN — FENTANYL CITRATE 25 MCG: 0.05 INJECTION, SOLUTION INTRAMUSCULAR; INTRAVENOUS at 09:50

## 2023-11-15 RX ADMIN — GLYCOPYRROLATE 0.4 MG: 0.2 INJECTION, SOLUTION INTRAMUSCULAR; INTRAVENOUS at 09:30

## 2023-11-15 RX ADMIN — ACETAMINOPHEN 975 MG: 325 TABLET ORAL at 07:54

## 2023-11-15 RX ADMIN — ONDANSETRON 4 MG: 2 INJECTION INTRAMUSCULAR; INTRAVENOUS at 09:30

## 2023-11-15 RX ADMIN — PROPOFOL 160 MG: 10 INJECTION, EMULSION INTRAVENOUS at 08:56

## 2023-11-15 RX ADMIN — OXYCODONE HYDROCHLORIDE 5 MG: 5 TABLET ORAL at 10:42

## 2023-11-15 RX ADMIN — KETOROLAC TROMETHAMINE 15 MG: 30 INJECTION, SOLUTION INTRAMUSCULAR; INTRAVENOUS at 09:32

## 2023-11-15 RX ADMIN — DEXAMETHASONE SODIUM PHOSPHATE 10 MG: 4 INJECTION, SOLUTION INTRA-ARTICULAR; INTRALESIONAL; INTRAMUSCULAR; INTRAVENOUS; SOFT TISSUE at 08:56

## 2023-11-15 RX ADMIN — PROPOFOL 180 MCG/KG/MIN: 10 INJECTION, EMULSION INTRAVENOUS at 08:58

## 2023-11-15 RX ADMIN — FENTANYL CITRATE 100 MCG: 50 INJECTION, SOLUTION INTRAMUSCULAR; INTRAVENOUS at 08:56

## 2023-11-15 RX ADMIN — SODIUM CHLORIDE, SODIUM LACTATE, POTASSIUM CHLORIDE, CALCIUM CHLORIDE: 600; 310; 30; 20 INJECTION, SOLUTION INTRAVENOUS at 08:05

## 2023-11-15 RX ADMIN — Medication 3 MG: at 09:30

## 2023-11-15 NOTE — ANESTHESIA PROCEDURE NOTES
Airway       Patient location during procedure: OR       Procedure Start/Stop Times: 11/15/2023 8:58 AM  Staff -        Anesthesiologist:  Jaswinder Doshi MD       CRNA: Meg Chau APRN CRNA       Performed By: CRNAIndications and Patient Condition       Indications for airway management: erin-procedural       Induction type:intravenous       Mask difficulty assessment: 1 - vent by mask    Final Airway Details       Final airway type: endotracheal airway       Successful airway: ETT - single and Oral  Endotracheal Airway Details        ETT size (mm): 7.0       Cuffed: yes       Successful intubation technique: direct laryngoscopy       DL Blade Type: Benson 2       Grade View of Cords: 1       Adjucts: stylet       Measured from: gums/teeth       Secured at (cm): 21       Bite block used: None    Post intubation assessment        Placement verified by: capnometry, equal breath sounds and chest rise        Number of attempts at approach: 1       Secured with: silk tape       Ease of procedure: easy       Dentition: Intact and Unchanged       Dental guard used and removed.    Medication(s) Administered   Medication Administration Time: 11/15/2023 8:58 AM

## 2023-11-15 NOTE — ANESTHESIA POSTPROCEDURE EVALUATION
Patient: Regina Nguyen    Procedure: Procedure(s):  DIAGNOSTIC LAPAROSCOPY WITH BILATERAL SALPINGECTOMY , HYSTEROSCOPY DILATION AND CURETTAGE POLYPECTOMY       Anesthesia Type:  General    Note:  Disposition: Outpatient   Postop Pain Control: Uneventful            Sign Out: Well controlled pain   PONV: No   Neuro/Psych: Uneventful            Sign Out: Acceptable/Baseline neuro status   Airway/Respiratory: Uneventful            Sign Out: Acceptable/Baseline resp. status   CV/Hemodynamics: Uneventful            Sign Out: Acceptable CV status; No obvious hypovolemia; No obvious fluid overload   Other NRE: NONE   DID A NON-ROUTINE EVENT OCCUR? No           Last vitals:  Vitals Value Taken Time   /63 11/15/23 1000   Temp 97  F (36.1  C) 11/15/23 0941   Pulse 86 11/15/23 1004   Resp 16 11/15/23 1000   SpO2 99 % 11/15/23 1004   Vitals shown include unfiled device data.    Electronically Signed By: Jaswinder Doshi MD  November 15, 2023  10:35 AM

## 2023-11-15 NOTE — DISCHARGE INSTRUCTIONS
You have received 975 mg of Acetaminophen (Tylenol) at 7:54A. Please do not take an additional dose of Tylenol until after 1:54PM     Do not exceed 4,000 mg of acetaminophen during a 24 hour period and keep in mind that acetaminophen can also be found in many over-the-counter cold medications as well as narcotics that may be given for pain.     You received a medication called Toradol (a stronger IV ibuprofen) at 9:32. Do NOT take any Ibuprofen / Advil / Aleve / Naproxen or products containing Ibuprofen until 3:32 pm or later.       If you have any questions or concerns regarding your procedure, please contact Dr. Carson, his office number is [phone].     [facility]   Adult Discharge Orders & Instructions     For 24 hours after surgery    Get plenty of rest.  A responsible adult must stay with you for at least 24 hours after you leave the hospital.   Do not drive or use heavy equipment.  If you have weakness or tingling, don't drive or use heavy equipment until this feeling goes away.  Do not drink alcohol.  Avoid strenuous or risky activities.  Ask for help when climbing stairs.   You may feel lightheaded.  IF so, sit for a few minutes before standing.  Have someone help you get up.   If you have nausea (feel sick to your stomach): Drink only clear liquids such as apple juice, ginger ale, broth or 7-Up.  Rest may also help.  Be sure to drink enough fluids.  Move to a regular diet as you feel able.  You may have a slight fever. Call the doctor if your fever is over 100 F (37.7 C) (taken under the tongue) or lasts longer than 24 hours.  You may have a dry mouth, a sore throat, muscle aches or trouble sleeping.  These should go away after 24 hours.  Do not make important or legal decisions.   Call your doctor for any of the followin.  Signs of infection (fever, growing tenderness at the surgery site, a large amount of drainage or bleeding, severe pain, foul-smelling drainage, redness,  swelling).    2. It has been over 8 to 10 hours since surgery and you are still not able to urinate (pass water).    3.  Headache for over 24 hours.

## 2023-11-15 NOTE — ANESTHESIA PREPROCEDURE EVALUATION
Anesthesia Pre-Procedure Evaluation    Patient: Regina Nguyen   MRN: [id number] : 1990        Procedure : Procedure(s):  DIAGNOSTIC LAPAROSCOPY WITH BILATERAL SALPINGECTOMY , HYSTEROSCOPY DILATION AND CURETTAGE POLYPECTOMY          Past Medical History:   Diagnosis Date    Anemia     Anxiety disorder     anxiety    Cervical high risk HPV (human papillomavirus) test positive 2021    3/25/13 ASCUS, neg HPV @ 23 yo 14 ASCUS, neg HPV @ 22 yo 11/15/17 NIL 21 NIL pap, +HR HPV (not 16/18). Plan: cotest in 1 year    Disease of thyroid gland     hyperthyroidism; postpartum    Thyroiditis       Past Surgical History:   Procedure Laterality Date    APPENDECTOMY      7th grade    IR BLOOD PATCH  2017      Allergies   Allergen Reactions    Benadryl [Diphenhydramine] Unknown      Social History     Tobacco Use    Smoking status: Never     Passive exposure: Never    Smokeless tobacco: Never   Substance Use Topics    Alcohol use: Yes     Comment: Alcoholic Drinks/day: every  so often; 2-3 drinks per week      Wt Readings from Last 1 Encounters:   11/15/23 64.4 kg (142 lb)        Anesthesia Evaluation   Pt has had prior anesthetic.     No history of anesthetic complications       ROS/MED HX  ENT/Pulmonary:  - neg pulmonary ROS     Neurologic:  - neg neurologic ROS     Cardiovascular:  - neg cardiovascular ROS     METS/Exercise Tolerance: >4 METS    Hematologic:  - neg hematologic  ROS     Musculoskeletal:  - neg musculoskeletal ROS     GI/Hepatic:  - neg GI/hepatic ROS     Renal/Genitourinary:  - neg Renal ROS     Endo:  - neg endo ROS     Psychiatric/Substance Use:  - neg psychiatric ROS     Infectious Disease:  - neg infectious disease ROS     Malignancy:  - neg malignancy ROS     Other:  - neg other ROS          Physical Exam    Airway        Mallampati: I   TM distance: > 3 FB   Neck ROM: full   Mouth opening: > 3 cm    Respiratory Devices and Support         Dental       (+) Completely normal  "teeth      Cardiovascular   cardiovascular exam normal          Pulmonary   pulmonary exam normal                OUTSIDE LABS:  CBC:   Lab Results   Component Value Date    WBC 5.2 11/09/2021    WBC 6.2 08/28/2018    HGB 12.7 11/09/2021    HGB 13.6 08/28/2018    HCT 38.1 11/09/2021    HCT 39.7 08/28/2018     11/09/2021     08/28/2018     BMP:   Lab Results   Component Value Date     11/09/2021    POTASSIUM 4.1 11/09/2021    CHLORIDE 104 11/09/2021    CO2 28 11/09/2021    BUN 7 (L) 11/09/2021    CR 0.67 11/09/2021    CR 0.80 05/25/2017    GLC 88 11/09/2021     COAGS: No results found for: \"PTT\", \"INR\", \"FIBR\"  POC:   Lab Results   Component Value Date    HCG Negative 11/15/2023     HEPATIC:   Lab Results   Component Value Date    ALBUMIN 4.0 11/09/2021    PROTTOTAL 6.7 11/09/2021    ALT <9 11/09/2021    AST 12 11/09/2021    ALKPHOS 52 11/09/2021    BILITOTAL 0.9 11/09/2021     OTHER:   Lab Results   Component Value Date    A1C 4.9 04/27/2021    BIANCA 8.9 11/09/2021    TSH 1.77 11/09/2021       Anesthesia Plan    ASA Status:  1       Anesthesia Type: General.     - Airway: ETT   Induction: Intravenous, Propofol.   Maintenance: Balanced.        Consents    Anesthesia Plan(s) and associated risks, benefits, and realistic alternatives discussed. Questions answered and patient/representative(s) expressed understanding.     - Discussed: Risks, Benefits and Alternatives for the PROCEDURE were discussed     - Discussed with:  Patient            Postoperative Care       PONV prophylaxis: Ondansetron (or other 5HT-3), Dexamethasone or Solumedrol, Background Propofol Infusion     Comments:                Jaswinder Doshi MD  "

## 2023-11-15 NOTE — ANESTHESIA CARE TRANSFER NOTE
Patient: Regina Nguyen    Procedure: Procedure(s):  DIAGNOSTIC LAPAROSCOPY WITH BILATERAL SALPINGECTOMY , HYSTEROSCOPY DILATION AND CURETTAGE POLYPECTOMY       Diagnosis: Pelvic pain [R10.2]  Polyp, corpus uteri [N84.0]  Diagnosis Additional Information: No value filed.    Anesthesia Type:   General     Note:    Oropharynx: oropharynx clear of all foreign objects and spontaneously breathing  Level of Consciousness: drowsy  Oxygen Supplementation: face mask  Level of Supplemental Oxygen (L/min / FiO2): 6  Independent Airway: airway patency satisfactory and stable  Dentition: dentition unchanged  Vital Signs Stable: post-procedure vital signs reviewed and stable  Report to RN Given: handoff report given  Patient transferred to: PACU    Handoff Report: Identifed the Patient, Identified the Reponsible Provider, Reviewed the pertinent medical history, Discussed the surgical course, Reviewed Intra-OP anesthesia mangement and issues during anesthesia, Set expectations for post-procedure period and Allowed opportunity for questions and acknowledgement of understanding      Vitals:  Vitals Value Taken Time   /64 11/15/23 0943   Temp 97  F (36.1  C) 11/15/23 0941   Pulse 120 11/15/23 0943   Resp 18 11/15/23 0941   SpO2 100 % 11/15/23 0943   Vitals shown include unfiled device data.    Electronically Signed By: SONIA Balladr CRNA  November 15, 2023  9:46 AM

## 2023-11-15 NOTE — OP NOTE
"PROCEDURE NOTE - LAPAROSCOPY, HYSTEROSCOPY AND DILATION AND CURETTAGE     NAME: Regina Nguyen   :  1990   MRN: [id number]      DATE OF SERVICE: 11/15/2023     PREOPERATIVE DIAGNOSIS:   Pelvic pain, undesired fertility, endometrial polyp with abnormal uterine bleeding    POSTOPERATIVE DIAGNOSIS:   Normal pelvis and endometrial polyp    PROCEDURES: laparoscopy,  bilateral salpingectomy, hysteroscopy, polypectomy and D&C    SURGEON: Ricky Carson MD     ASSISTANT:   OR staff    ANESTHESIA: general     ESTIMATED BLOOD LOSS: 2 cc    HISTORY OF PRESENT ILLNESS:   Regina Nguyen is a 33 year old female with history of pelvic pain and abnormal uterine bleeding and undesired future fertility .     CONSENT:  She was met preoperatively, where we discussed the procedure and the risks associated with the procedure. She understood these to be, but not limited to injury to adjacent organs including bladder, bowel, ureter, infection and bleeding. Patient signed consent.    PROCEDURE  Patient was  brought back to the operating room in stable condition. Patient underwent induction of a general anesthetic, she was carefully prepped and draped in sterile fashion in the dorsal lithotomy position. Timeout was performed. Bladder was drained with a Sargent catheter, uterine manipulator placed into the uterus.     Attention was turned to the abdomen. An incision above the umbilicus. A Veress needle was introduced with a 2 pop technique, saline drop test confirmed adequate placement. Opening pressure was 3-4. Insufflation was done until 15mm of pressure were established. A 5 nonbladed trocar was placed above the umbilicus. Two more port sights were place in the right and left lower quadrants under direct visualization. Trandelenburg assistance was then used.     The procedure began with identifying the anatomy. The uterus was visualized was found to be normal appearing, tubes appeared normal, ovaries were  \"normal.  " There is evidence of prior appendectomy.  There was a normal liver edge and gallbladder.  There were no adhesions ascites noted.  There was no evidence of endometriosis.  Using a LigaSure I took down the mesosalpinx on the left side and detach the tube from its connection to the uterus.  The specimen was brought out through one of the 5 mm ports.  Same procedure was over the right tube with similar outcome.  Hemostasis was noted throughout.  30 cc of quarter percent Marcaine was injected in the peritoneal cavity.    The trocars were then removed and the gas was removed from the abdomen. Skin was closed with 4-0 vicryl suture. Steri-Strips applied. Procedure was then turned to the lower field. A bimanual exam was done, demonstrating a small anteverted uterus. The anterior lip of the cervix was regrasped with single tooth tenaculum. Uterus was then sounded to 8 cm. The cervix was gently dilated using Marilu dilators and the hysteroscope was introduced. Cavity of the uterus was noted to have some polypoid tissue posteriorly and was otherwise normal.  Using a MyoSure I was able to resect this tissue and leave what looked to be a clean cavity.  At this point good hemostasis was noted with this portion of the procedure. Instruments were removed from vagina. No active bleeding was noted. Sponge and needle counts were correct X 2. She was taken to recovery in stable condition.      @ME2@     CC: Valerio Neves, Ricky Carson MD

## 2024-05-21 DIAGNOSIS — G47.00 INSOMNIA, UNSPECIFIED TYPE: ICD-10-CM

## 2024-05-21 RX ORDER — ZOLPIDEM TARTRATE 5 MG/1
TABLET ORAL
Qty: 30 TABLET | Refills: 5 | Status: SHIPPED | OUTPATIENT
Start: 2024-05-21

## 2024-06-13 ENCOUNTER — PATIENT OUTREACH (OUTPATIENT)
Dept: CARE COORDINATION | Facility: CLINIC | Age: 34
End: 2024-06-13
Payer: COMMERCIAL

## 2024-06-27 ENCOUNTER — PATIENT OUTREACH (OUTPATIENT)
Dept: CARE COORDINATION | Facility: CLINIC | Age: 34
End: 2024-06-27
Payer: COMMERCIAL

## 2024-08-25 ENCOUNTER — HEALTH MAINTENANCE LETTER (OUTPATIENT)
Age: 34
End: 2024-08-25

## 2024-09-11 ENCOUNTER — OFFICE VISIT (OUTPATIENT)
Dept: FAMILY MEDICINE | Facility: CLINIC | Age: 34
End: 2024-09-11
Payer: COMMERCIAL

## 2024-09-11 ENCOUNTER — ANCILLARY PROCEDURE (OUTPATIENT)
Dept: GENERAL RADIOLOGY | Facility: CLINIC | Age: 34
End: 2024-09-11
Attending: FAMILY MEDICINE
Payer: COMMERCIAL

## 2024-09-11 VITALS
HEART RATE: 78 BPM | SYSTOLIC BLOOD PRESSURE: 123 MMHG | RESPIRATION RATE: 18 BRPM | OXYGEN SATURATION: 97 % | DIASTOLIC BLOOD PRESSURE: 76 MMHG | TEMPERATURE: 97.8 F

## 2024-09-11 DIAGNOSIS — S92.414A CLOSED NONDISPLACED FRACTURE OF PROXIMAL PHALANX OF RIGHT GREAT TOE, INITIAL ENCOUNTER: Primary | ICD-10-CM

## 2024-09-11 DIAGNOSIS — S99.921A INJURY OF RIGHT FOOT, INITIAL ENCOUNTER: ICD-10-CM

## 2024-09-11 PROCEDURE — 99213 OFFICE O/P EST LOW 20 MIN: CPT | Performed by: FAMILY MEDICINE

## 2024-09-11 PROCEDURE — 73630 X-RAY EXAM OF FOOT: CPT | Mod: TC | Performed by: RADIOLOGY

## 2024-09-11 NOTE — PATIENT INSTRUCTIONS
Elevate  Ice  Limit weight bearing, use the post op shoe when ambulating  May use an ace wrap at night or just sitting.     Tylenol and ibuprofen staggered for pain as needed.     Follow up with ortho sports medicine in 1-2 weeks - they are here most days at this location or have other locations.   Or if you prefer [facility] or other that is fine if your insurance allows.        08-Dec-2022 13:03

## 2024-09-12 NOTE — PROGRESS NOTES
Assessment/Plan:   1. Injury of right foot, initial encounter  - XR Foot Right G/E 3 Views; Future  2. Closed nondisplaced fracture of proximal phalanx of right great toe, initial encounter  - Ankle/Foot Bracing Supplies Order Post-op Shoe; Right  - Orthopedic  Referral; Future    Acute injury to the right distal medial foot and great toe about 8 days ago while playing soccer.  She believes she was stepped on with soccer cleats.   Persistent pain swelling and bruising limits her ambulation.    X-rays obtained today and viewed by me.  There appears to be an avulsion fracture or chip fracture at the base of the proximal phalanx of the great toe, extends to the joint.  Ace wrapped the distal foot today for comfort while in the office.  Will use a postop shoe for additional support.  Continue elevation, ice, limited weightbearing.  Continue ibuprofen as needed for pain.  May stagger with Tylenol.  Follow-up with sports medicine in 1 week.    Plan:  Elevate  Ice  Limit weight bearing, use the post op shoe when ambulating  May use an ace wrap at night or just sitting.     Tylenol and ibuprofen staggered for pain as needed.     Follow up with ortho sports medicine or podiatry in 1-2 weeks - they are here most days at this location or have other locations.   Or if you prefer [facility] or other that is fine if your insurance allows.     I discussed red flag symptoms, return precautions to clinic/ER and follow up care with patient/parent.  Expected clinical course, symptomatic cares advised. Questions answered. Patient/parent amenable with plan.      Subjective:     Regina Nguyen is a 34 year old female who presents for evaluation of pain in the right foot.  She injured it last Tuesday while playing in a fun parents versus kids soccer game.  She was stepped on by a kid wearing soccer cleats.  Pain at the right distal foot near the base of the great toe.  There is a fair amount of bruising initially but that  has been fading.  There is swelling and pain of the great toe.  She has been icing and heating and taking ibuprofen 600 mg twice a day.  It is really not getting better.  She has pain with walking.  Today she noticed some numbness and tingling in that area.    Allergies   Allergen Reactions    Benadryl [Diphenhydramine] Unknown     Current Outpatient Medications   Medication Sig Dispense Refill    ibuprofen (ADVIL/MOTRIN) 600 MG tablet Take 1 tablet (600 mg) by mouth every 6 hours as needed (Patient not taking: Reported on 9/11/2024) 30 tablet 1    venlafaxine (EFFEXOR-XR) 75 MG 24 hr capsule Take 1 capsule (75 mg) by mouth daily (Patient not taking: Reported on 9/11/2024) 30 capsule 2    zolpidem (AMBIEN) 5 MG tablet TAKE ONE TABLET BY MOUTH AT BEDTIME AS NEEDED FOR SLEEP (Patient not taking: Reported on 9/11/2024) 30 tablet 5     No current facility-administered medications for this visit.     Patient Active Problem List   Diagnosis    Anxiety    Tachycardia    Thyroiditis    Hair loss    Positional headache    Hearing loss    Headache    Intractable headache    Pituitary disease (H24)    Cervical high risk HPV (human papillomavirus) test positive       Objective:     /76   Pulse 78   Temp 97.8  F (36.6  C) (Oral)   Resp 18   SpO2 97%     Physical    General Appearance: Alert, pleasant, no distress, aVSS  Head: Normocephalic, without obvious abnormality, atraumatic  Eyes: Conjunctivae are normal.   Lungs:  respirations unlabored  Extremities: No lower extremity edema, there is some mild swelling at the base of the right great toe as well as the toe.  There is fading bruising.  Tender to palpate the medial edge of the first MTP and proximal toe.  Decreased range of motion.  Normal capillary refill and pedal pulses.  Skin: No open skin wounds rash or lesions on the right foot and toe.  Psychiatric: Patient has a normal mood and affect.         Results for orders placed or performed in visit on 09/11/24    XR Foot Right G/E 3 Views     Status: None    Narrative    EXAM: XR FOOT RIGHT G/E 3 VIEWS  LOCATION: [facility]  DATE: 9/11/2024    INDICATION: Right distal foot stepped on with cleats over the 1st toe and MTP last week. increasing swelling and pain  COMPARISON: None.      Impression    IMPRESSION: Acute nondisplaced avulsion fracture of the medial corner at the base of the first toe proximal phalanx, with extension into the first MTP joint. The fractured fragment is small, measuring 4 x 2 mm. Joint alignment remains normal. Mild   adjacent soft tissue swelling. Otherwise unremarkable right foot.        This note has been dictated in part using voice recognition software.  Any grammatical or context distortions are unintentional and inherent to the software.  Please feel free to contact me directly for clarification if needed.

## 2024-09-24 ENCOUNTER — ANCILLARY PROCEDURE (OUTPATIENT)
Dept: GENERAL RADIOLOGY | Facility: CLINIC | Age: 34
End: 2024-09-24
Attending: PODIATRIST
Payer: COMMERCIAL

## 2024-09-24 ENCOUNTER — OFFICE VISIT (OUTPATIENT)
Dept: PODIATRY | Facility: CLINIC | Age: 34
End: 2024-09-24
Attending: FAMILY MEDICINE
Payer: COMMERCIAL

## 2024-09-24 VITALS
RESPIRATION RATE: 16 BRPM | OXYGEN SATURATION: 99 % | SYSTOLIC BLOOD PRESSURE: 112 MMHG | HEART RATE: 78 BPM | TEMPERATURE: 98.4 F | DIASTOLIC BLOOD PRESSURE: 72 MMHG

## 2024-09-24 DIAGNOSIS — S92.414A CLOSED NONDISPLACED FRACTURE OF PROXIMAL PHALANX OF RIGHT GREAT TOE, INITIAL ENCOUNTER: ICD-10-CM

## 2024-09-24 DIAGNOSIS — S92.414A CLOSED NONDISPLACED FRACTURE OF PROXIMAL PHALANX OF RIGHT GREAT TOE, INITIAL ENCOUNTER: Primary | ICD-10-CM

## 2024-09-24 PROCEDURE — 73630 X-RAY EXAM OF FOOT: CPT | Mod: RT | Performed by: PODIATRIST

## 2024-09-24 PROCEDURE — 99203 OFFICE O/P NEW LOW 30 MIN: CPT | Performed by: PODIATRIST

## 2024-09-24 ASSESSMENT — PAIN SCALES - GENERAL: PAINLEVEL: MODERATE PAIN (4)

## 2024-09-24 NOTE — PATIENT INSTRUCTIONS
Remain non weight bearing at all times with a rolling knee walker and CAM boot.  Wear your CAM boot anytime you are up and out of bed.     Stop at [facility] to  the CAM boot and knee walker.

## 2024-09-24 NOTE — LETTER
9/24/2024      Regina Nguyen  [address] [address]      Dear Colleague,    Thank you for referring your patient, Regina Nguyen, to the [facility]. Please see a copy of my visit note below.          FOOT AND ANKLE SURGERY/PODIATRY CONSULT NOTE         ASSESSMENT: Closed minimally displaced fracture proximal phalanx right hallux      TREATMENT:  -I reviewed the patient's weightbearing x-rays from today which indicate a minimally displaced fracture along the medial base of the proximal phalanx of the hallux which appears to be intra-articular.    -I discussed with the patient that the fracture fragment is very small and she does have bony healing as compared to previous x-rays.  However, she does have lucency of the fragment which we will closely monitor.    -Because of the amount of lucency identifiedon today's x-rays and because the fracture is intra-articular I recommend nonweightbearing on the right foot at this time.  Also recommend cam boot for greater stability.    -Discussed that I recommend nonweightbearing for minimum of 6 to 8 weeks and will consider ORIF or removal of fracture fragment if nonunion develops.  Because the fracture fragment is very small ORIF may not be able to be achieved.    -Referred for cam boot today.  Referred for knee walker to remain nonweightbearing on the right foot.    -Patient's questions invited and answered. Patient to return to clinic in 4 week(s) for re-evaluation and repeat weight bearing x-rays. She was encouraged to call my office with any further questions or concerns.     Robert Crane DPM  [facility]/Foot & Ankle Surgery      HPI: I was asked to see Regina Nguyen today for a right hallux fracture.  Patient reports that she injured the right foot approximately 3 weeks ago in a parent/child soccer game and noticed discomfort after one of the kids stepped on her right foot.  She was seen at  her primary care provider's office and x-rays indicated a minimally displaced fracture of the base of the proximal phalanx of the hallux.  She has been ambulating in a surgical shoe and continues to have discomfort with ambulation.  Denies use of tobacco.    Past Medical History:   Diagnosis Date     Anemia      Anxiety disorder     anxiety     Cervical high risk HPV (human papillomavirus) test positive 04/27/2021    3/25/13 ASCUS, neg HPV @ 21 yo 2/4/14 ASCUS, neg HPV @ 22 yo 11/15/17 NIL 4/27/21 NIL pap, +HR HPV (not 16/18). Plan: cotest in 1 year     Disease of thyroid gland     hyperthyroidism; postpartum     Thyroiditis          Social History     Socioeconomic History     Marital status:      Spouse name: Not on file     Number of children: Not on file     Years of education: Not on file     Highest education level: Not on file   Occupational History     Not on file   Tobacco Use     Smoking status: Never     Passive exposure: Never     Smokeless tobacco: Never   Vaping Use     Vaping status: Never Used   Substance and Sexual Activity     Alcohol use: Yes     Comment: Alcoholic Drinks/day: every  so often; 2-3 drinks per week     Drug use: No     Sexual activity: Yes     Partners: Male     Birth control/protection: Condom   Other Topics Concern     Not on file   Social History Narrative     Not on file     Social Determinants of Health     Financial Resource Strain: Low Risk  (11/9/2023)    Financial Resource Strain      Within the past 12 months, have you or your family members you live with been unable to get utilities (heat, electricity) when it was really needed?: No   Food Insecurity: Low Risk  (11/9/2023)    Food Insecurity      Within the past 12 months, did you worry that your food would run out before you got money to buy more?: No      Within the past 12 months, did the food you bought just not last and you didn t have money to get more?: No   Transportation Needs: Low Risk  (11/9/2023)     Transportation Needs      Within the past 12 months, has lack of transportation kept you from medical appointments, getting your medicines, non-medical meetings or appointments, work, or from getting things that you need?: No   Physical Activity: Not on file   Stress: Not on file (2/26/2021)   Social Connections: Not on file   Interpersonal Safety: Low Risk  (11/9/2023)    Interpersonal Safety      Do you feel physically and emotionally safe where you currently live?: Yes      Within the past 12 months, have you been hit, slapped, kicked or otherwise physically hurt by someone?: No      Within the past 12 months, have you been humiliated or emotionally abused in other ways by your partner or ex-partner?: No   Housing Stability: Low Risk  (11/9/2023)    Housing Stability      Do you have housing? : Yes      Are you worried about losing your housing?: No            Allergies   Allergen Reactions     Benadryl [Diphenhydramine] Unknown         MEDICATIONS:   Current Outpatient Medications   Medication Sig Dispense Refill     ibuprofen (ADVIL/MOTRIN) 600 MG tablet Take 1 tablet (600 mg) by mouth every 6 hours as needed (Patient not taking: Reported on 9/11/2024) 30 tablet 1     venlafaxine (EFFEXOR-XR) 75 MG 24 hr capsule Take 1 capsule (75 mg) by mouth daily (Patient not taking: Reported on 9/11/2024) 30 capsule 2     zolpidem (AMBIEN) 5 MG tablet TAKE ONE TABLET BY MOUTH AT BEDTIME AS NEEDED FOR SLEEP (Patient not taking: Reported on 9/11/2024) 30 tablet 5     No current facility-administered medications for this visit.        Family History   Problem Relation Age of Onset     Heart Disease Father         a. fib     Heart Disease Mother      Coronary Artery Disease Mother 44.00        a second at 50+     No Known Problems Brother      No Known Problems Daughter      No Known Problems Son      Heart Disease Maternal Grandmother      Heart Disease Maternal Grandfather      No Known Problems Brother           Review of  Systems - 10 point Review of Systems is negative except for right hallux fracture which is noted in HPI.    OBJECTIVE:  Appearance: alert, well appearing, and in no distress.    VITAL SIGNS: /72   Pulse 78   Temp 98.4  F (36.9  C)   Resp 16   SpO2 99%       General appearance: Patient is alert and fully cooperative with history & exam.  No sign of distress is noted during the visit.     Psychiatric: Affect is pleasant & appropriate.  Patient appears motivated to improve health.     Respiratory: Breathing is regular & unlabored while sitting.     HEENT: Hearing is intact to spoken word.  Speech is clear.  No gross evidence of visual impairment that would impact ambulation.      Vascular: Dorsalis pedis and posterior tibial pulses are palpable. There is pedal hair growth right.  CFT < 3 sec from anterior tibial surface to distal digits right.  Mild edema first MPJ right foot.  Dermatologic: Turgor and texture are within normal limits. No coloration or temperature changes. No primary or secondary lesions noted.  Neurologic: All epicritic and proprioceptive sensations are grossly intact right.  Musculoskeletal: Pain to palpation along base of the right hallux and distal first metatarsal right foot.  No pain along plantar first MPJ along sesamoids or with range of motion first MPJ right foot.          Again, thank you for allowing me to participate in the care of your patient.        Sincerely,        Robert Crane DPM

## 2024-09-24 NOTE — PROGRESS NOTES
FOOT AND ANKLE SURGERY/PODIATRY CONSULT NOTE         ASSESSMENT: Closed minimally displaced fracture proximal phalanx right hallux      TREATMENT:  -I reviewed the patient's weightbearing x-rays from today which indicate a minimally displaced fracture along the medial base of the proximal phalanx of the hallux which appears to be intra-articular.    -I discussed with the patient that the fracture fragment is very small and she does have bony healing as compared to previous x-rays.  However, she does have lucency of the fragment which we will closely monitor.    -Because of the amount of lucency identifiedon today's x-rays and because the fracture is intra-articular I recommend nonweightbearing on the right foot at this time.  Also recommend cam boot for greater stability.    -Discussed that I recommend nonweightbearing for minimum of 6 to 8 weeks and will consider ORIF or removal of fracture fragment if nonunion develops.  Because the fracture fragment is very small ORIF may not be able to be achieved.    -Referred for cam boot today.  Referred for knee walker to remain nonweightbearing on the right foot.    -Patient's questions invited and answered. Patient to return to clinic in 4 week(s) for re-evaluation and repeat weight bearing x-rays. She was encouraged to call my office with any further questions or concerns.     ARISTEO Gallardo [facility] Podiatry/Foot & Ankle Surgery      HPI: I was asked to see Regina Nguyen today for a right hallux fracture.  Patient reports that she injured the right foot approximately 3 weeks ago in a parent/child soccer game and noticed discomfort after one of the kids stepped on her right foot.  She was seen at her primary care provider's office and x-rays indicated a minimally displaced fracture of the base of the proximal phalanx of the hallux.  She has been ambulating in a surgical shoe and continues to have discomfort with ambulation.  Denies use of  tobacco.    Past Medical History:   Diagnosis Date    Anemia     Anxiety disorder     anxiety    Cervical high risk HPV (human papillomavirus) test positive 04/27/2021    3/25/13 ASCUS, neg HPV @ 23 yo 2/4/14 ASCUS, neg HPV @ 22 yo 11/15/17 NIL 4/27/21 NIL pap, +HR HPV (not 16/18). Plan: cotest in 1 year    Disease of thyroid gland     hyperthyroidism; postpartum    Thyroiditis          Social History     Socioeconomic History    Marital status:      Spouse name: Not on file    Number of children: Not on file    Years of education: Not on file    Highest education level: Not on file   Occupational History    Not on file   Tobacco Use    Smoking status: Never     Passive exposure: Never    Smokeless tobacco: Never   Vaping Use    Vaping status: Never Used   Substance and Sexual Activity    Alcohol use: Yes     Comment: Alcoholic Drinks/day: every  so often; 2-3 drinks per week    Drug use: No    Sexual activity: Yes     Partners: Male     Birth control/protection: Condom   Other Topics Concern    Not on file   Social History Narrative    Not on file     Social Determinants of Health     Financial Resource Strain: Low Risk  (11/9/2023)    Financial Resource Strain     Within the past 12 months, have you or your family members you live with been unable to get utilities (heat, electricity) when it was really needed?: No   Food Insecurity: Low Risk  (11/9/2023)    Food Insecurity     Within the past 12 months, did you worry that your food would run out before you got money to buy more?: No     Within the past 12 months, did the food you bought just not last and you didn t have money to get more?: No   Transportation Needs: Low Risk  (11/9/2023)    Transportation Needs     Within the past 12 months, has lack of transportation kept you from medical appointments, getting your medicines, non-medical meetings or appointments, work, or from getting things that you need?: No   Physical Activity: Not on file   Stress: Not  on file (2/26/2021)   Social Connections: Not on file   Interpersonal Safety: Low Risk  (11/9/2023)    Interpersonal Safety     Do you feel physically and emotionally safe where you currently live?: Yes     Within the past 12 months, have you been hit, slapped, kicked or otherwise physically hurt by someone?: No     Within the past 12 months, have you been humiliated or emotionally abused in other ways by your partner or ex-partner?: No   Housing Stability: Low Risk  (11/9/2023)    Housing Stability     Do you have housing? : Yes     Are you worried about losing your housing?: No            Allergies   Allergen Reactions    Benadryl [Diphenhydramine] Unknown         MEDICATIONS:   Current Outpatient Medications   Medication Sig Dispense Refill    ibuprofen (ADVIL/MOTRIN) 600 MG tablet Take 1 tablet (600 mg) by mouth every 6 hours as needed (Patient not taking: Reported on 9/11/2024) 30 tablet 1    venlafaxine (EFFEXOR-XR) 75 MG 24 hr capsule Take 1 capsule (75 mg) by mouth daily (Patient not taking: Reported on 9/11/2024) 30 capsule 2    zolpidem (AMBIEN) 5 MG tablet TAKE ONE TABLET BY MOUTH AT BEDTIME AS NEEDED FOR SLEEP (Patient not taking: Reported on 9/11/2024) 30 tablet 5     No current facility-administered medications for this visit.        Family History   Problem Relation Age of Onset    Heart Disease Father         a. fib    Heart Disease Mother     Coronary Artery Disease Mother 44.00        a second at 50+    No Known Problems Brother     No Known Problems Daughter     No Known Problems Son     Heart Disease Maternal Grandmother     Heart Disease Maternal Grandfather     No Known Problems Brother           Review of Systems - 10 point Review of Systems is negative except for right hallux fracture which is noted in HPI.    OBJECTIVE:  Appearance: alert, well appearing, and in no distress.    VITAL SIGNS: /72   Pulse 78   Temp 98.4  F (36.9  C)   Resp 16   SpO2 99%       General appearance:  Patient is alert and fully cooperative with history & exam.  No sign of distress is noted during the visit.     Psychiatric: Affect is pleasant & appropriate.  Patient appears motivated to improve health.     Respiratory: Breathing is regular & unlabored while sitting.     HEENT: Hearing is intact to spoken word.  Speech is clear.  No gross evidence of visual impairment that would impact ambulation.      Vascular: Dorsalis pedis and posterior tibial pulses are palpable. There is pedal hair growth right.  CFT < 3 sec from anterior tibial surface to distal digits right.  Mild edema first MPJ right foot.  Dermatologic: Turgor and texture are within normal limits. No coloration or temperature changes. No primary or secondary lesions noted.  Neurologic: All epicritic and proprioceptive sensations are grossly intact right.  Musculoskeletal: Pain to palpation along base of the right hallux and distal first metatarsal right foot.  No pain along plantar first MPJ along sesamoids or with range of motion first MPJ right foot.

## 2024-10-14 DIAGNOSIS — M79.671 RIGHT FOOT PAIN: Primary | ICD-10-CM

## 2024-10-15 ENCOUNTER — OFFICE VISIT (OUTPATIENT)
Dept: PODIATRY | Facility: CLINIC | Age: 34
End: 2024-10-15
Payer: COMMERCIAL

## 2024-10-15 ENCOUNTER — ANCILLARY PROCEDURE (OUTPATIENT)
Dept: GENERAL RADIOLOGY | Facility: CLINIC | Age: 34
End: 2024-10-15
Attending: PODIATRIST
Payer: COMMERCIAL

## 2024-10-15 VITALS — HEART RATE: 77 BPM | TEMPERATURE: 98.4 F | OXYGEN SATURATION: 99 % | RESPIRATION RATE: 16 BRPM

## 2024-10-15 DIAGNOSIS — S92.421S CLOSED DISPLACED FRACTURE OF DISTAL PHALANX OF RIGHT GREAT TOE, SEQUELA: Primary | ICD-10-CM

## 2024-10-15 DIAGNOSIS — S92.414A CLOSED NONDISPLACED FRACTURE OF PROXIMAL PHALANX OF RIGHT GREAT TOE, INITIAL ENCOUNTER: Primary | ICD-10-CM

## 2024-10-15 DIAGNOSIS — M79.671 RIGHT FOOT PAIN: ICD-10-CM

## 2024-10-15 PROCEDURE — 99213 OFFICE O/P EST LOW 20 MIN: CPT | Performed by: PODIATRIST

## 2024-10-15 PROCEDURE — 73630 X-RAY EXAM OF FOOT: CPT | Mod: RT | Performed by: PODIATRIST

## 2024-10-15 RX ORDER — OXYCODONE HYDROCHLORIDE 5 MG/1
5 TABLET ORAL EVERY 6 HOURS PRN
Qty: 10 TABLET | Refills: 0 | Status: SHIPPED | OUTPATIENT
Start: 2024-10-15 | End: 2024-10-18

## 2024-10-15 ASSESSMENT — PAIN SCALES - GENERAL: PAINLEVEL: MILD PAIN (3)

## 2024-10-15 NOTE — PROGRESS NOTES
FOOT AND ANKLE SURGERY/PODIATRY PROGRESS NOTE        ASSESSMENT: Closed minimally displaced fracture proximal phalanx right hallux         TREATMENT:  -I reviewed the patient's weightbearing x-rays from today of the right foot which indicate increased lucency along the fracture fragment medial base of the proximal phalanx which is intra-articular.    -I discussed the above with the patient today including increased displacement of the fracture fragment along the medial base of the proximal phalanx of the hallux which is intra-articular.    -Based on the above, I recommend a CT scan for further evaluation.  We discussed possible surgical resection of the fracture fragment as it appears to be too small for ORIF.    -Patient will continue with nonweightbearing on the right foot with knee walker and cam boot for stability.    -I will contact the patient with the CT report when available and we will be guided by the results.     -Patient's questions invited and answered. She was encouraged to call my office with any further questions or concerns.     Robert Crane DPM  [facility] Podiatry/Foot & Ankle Surgery      HPI: Regina Nguyen was seen again today for a right hallux fracture.  Patient reports she is remained mostly nonweightbearing on the right foot using a knee walker at home.  She also has a cam boot for stability.    Past Medical History:   Diagnosis Date    Anemia     Anxiety disorder     anxiety    Cervical high risk HPV (human papillomavirus) test positive 04/27/2021    3/25/13 ASCUS, neg HPV @ 23 yo 2/4/14 ASCUS, neg HPV @ 22 yo 11/15/17 NIL 4/27/21 NIL pap, +HR HPV (not 16/18). Plan: cotest in 1 year    Disease of thyroid gland     hyperthyroidism; postpartum    Thyroiditis        Past Surgical History:   Procedure Laterality Date    APPENDECTOMY      7th grade    IR BLOOD PATCH  5/20/2017    LAPAROSCOPY DIAGNOSTIC (GYN) Bilateral 11/15/2023    Procedure: DIAGNOSTIC LAPAROSCOPY WITH  BILATERAL SALPINGECTOMY , HYSTEROSCOPY DILATION AND CURETTAGE POLYPECTOMY;  Surgeon: Alli, Ricky Traore MD;  Location: [place] [facility]       Allergies   Allergen Reactions    Benadryl [Diphenhydramine] Unknown         Current Outpatient Medications:     ibuprofen (ADVIL/MOTRIN) 600 MG tablet, Take 1 tablet (600 mg) by mouth every 6 hours as needed (Patient not taking: Reported on 9/11/2024), Disp: 30 tablet, Rfl: 1    venlafaxine (EFFEXOR-XR) 75 MG 24 hr capsule, Take 1 capsule (75 mg) by mouth daily (Patient not taking: Reported on 9/11/2024), Disp: 30 capsule, Rfl: 2    zolpidem (AMBIEN) 5 MG tablet, TAKE ONE TABLET BY MOUTH AT BEDTIME AS NEEDED FOR SLEEP (Patient not taking: Reported on 9/11/2024), Disp: 30 tablet, Rfl: 5    Family History   Problem Relation Age of Onset    Heart Disease Father         a. fib    Heart Disease Mother     Coronary Artery Disease Mother 44.00        a second at 50+    No Known Problems Brother     No Known Problems Daughter     No Known Problems Son     Heart Disease Maternal Grandmother     Heart Disease Maternal Grandfather     No Known Problems Brother        Social History     Socioeconomic History    Marital status:      Spouse name: Not on file    Number of children: Not on file    Years of education: Not on file    Highest education level: Not on file   Occupational History    Not on file   Tobacco Use    Smoking status: Never     Passive exposure: Never    Smokeless tobacco: Never   Vaping Use    Vaping status: Never Used   Substance and Sexual Activity    Alcohol use: Yes     Comment: Alcoholic Drinks/day: every  so often; 2-3 drinks per week    Drug use: No    Sexual activity: Yes     Partners: Male     Birth control/protection: Condom   Other Topics Concern    Not on file   Social History Narrative    Not on file     Social Determinants of Health     Financial Resource Strain: Low Risk  (11/9/2023)    Financial Resource Strain     Within the past 12 months,  have you or your family members you live with been unable to get utilities (heat, electricity) when it was really needed?: No   Food Insecurity: Low Risk  (11/9/2023)    Food Insecurity     Within the past 12 months, did you worry that your food would run out before you got money to buy more?: No     Within the past 12 months, did the food you bought just not last and you didn t have money to get more?: No   Transportation Needs: Low Risk  (11/9/2023)    Transportation Needs     Within the past 12 months, has lack of transportation kept you from medical appointments, getting your medicines, non-medical meetings or appointments, work, or from getting things that you need?: No   Physical Activity: Not on file   Stress: Not on file (2/26/2021)   Social Connections: Not on file   Interpersonal Safety: Low Risk  (11/9/2023)    Interpersonal Safety     Do you feel physically and emotionally safe where you currently live?: Yes     Within the past 12 months, have you been hit, slapped, kicked or otherwise physically hurt by someone?: No     Within the past 12 months, have you been humiliated or emotionally abused in other ways by your partner or ex-partner?: No   Housing Stability: Low Risk  (11/9/2023)    Housing Stability     Do you have housing? : Yes     Are you worried about losing your housing?: No       10 point Review of Systems is negative except for right hallux fracture which is noted in HPI.     Pulse 77   Temp 98.4  F (36.9  C)   Resp 16   SpO2 99%     BMI= There is no height or weight on file to calculate BMI.    OBJECTIVE:  General appearance: Patient is alert and fully cooperative with history & exam.  No sign of distress is noted during the visit.    Vascular: Dorsalis pedis and posterior tibial pulses are palpable. There is pedal hair growth right.  CFT < 3 sec from anterior tibial surface to distal digits right.  Mild edema first MPJ right foot.  Dermatologic: Turgor and texture are within normal  limits. No coloration or temperature changes. No primary or secondary lesions noted.  Neurologic: All epicritic and proprioceptive sensations are grossly intact right.  Musculoskeletal: Pain to palpation along base of the right hallux and distal first metatarsal right foot.  No pain along plantar first MPJ along sesamoids or with range of motion first MPJ right foot.      Imaging:     XR Foot 3 Views Standing Right    Result Date: 9/30/2024  Minimally displaced fracture along the medial base of the proximal phalanx of the hallux which appears to be intra-articular.

## 2024-10-15 NOTE — LETTER
10/15/2024      Regina Nguyen  [address] [address] [facility] [address]      Dear Colleague,    Thank you for referring your patient, Regina Nguyen, to the [facility]. Please see a copy of my visit note below.        FOOT AND ANKLE SURGERY/PODIATRY PROGRESS NOTE        ASSESSMENT: Closed minimally displaced fracture proximal phalanx right hallux         TREATMENT:  -I reviewed the patient's weightbearing x-rays from today of the right foot which indicate increased lucency along the fracture fragment medial base of the proximal phalanx which is intra-articular.    -I discussed the above with the patient today including increased displacement of the fracture fragment along the medial base of the proximal phalanx of the hallux which is intra-articular.    -Based on the above, I recommend a CT scan for further evaluation.  We discussed possible surgical resection of the fracture fragment as it appears to be too small for ORIF.    -Patient will continue with nonweightbearing on the right foot with knee walker and cam boot for stability.    -I will contact the patient with the CT report when available and we will be guided by the results.     -Patient's questions invited and answered. She was encouraged to call my office with any further questions or concerns.     Robert Crane DPM  [facility] Podiatry/Foot & Ankle Surgery      HPI: Regina Nguyen was seen again today for a right hallux fracture.  Patient reports she is remained mostly nonweightbearing on the right foot using a knee walker at home.  She also has a cam boot for stability.    Past Medical History:   Diagnosis Date     Anemia      Anxiety disorder     anxiety     Cervical high risk HPV (human papillomavirus) test positive 04/27/2021    3/25/13 ASCUS, neg HPV @ 23 yo 2/4/14 ASCUS, neg HPV @ 24 yo 11/15/17 NIL 4/27/21 NIL pap, +HR HPV (not 16/18). Plan: cotest in 1 year     Disease of thyroid gland      hyperthyroidism; postpartum     Thyroiditis        Past Surgical History:   Procedure Laterality Date     APPENDECTOMY      7th grade     IR BLOOD PATCH  5/20/2017     LAPAROSCOPY DIAGNOSTIC (GYN) Bilateral 11/15/2023    Procedure: DIAGNOSTIC LAPAROSCOPY WITH BILATERAL SALPINGECTOMY , HYSTEROSCOPY DILATION AND CURETTAGE POLYPECTOMY;  Surgeon: Ricky Carson MD;  Location: [place] [facility]       Allergies   Allergen Reactions     Benadryl [Diphenhydramine] Unknown         Current Outpatient Medications:      ibuprofen (ADVIL/MOTRIN) 600 MG tablet, Take 1 tablet (600 mg) by mouth every 6 hours as needed (Patient not taking: Reported on 9/11/2024), Disp: 30 tablet, Rfl: 1     venlafaxine (EFFEXOR-XR) 75 MG 24 hr capsule, Take 1 capsule (75 mg) by mouth daily (Patient not taking: Reported on 9/11/2024), Disp: 30 capsule, Rfl: 2     zolpidem (AMBIEN) 5 MG tablet, TAKE ONE TABLET BY MOUTH AT BEDTIME AS NEEDED FOR SLEEP (Patient not taking: Reported on 9/11/2024), Disp: 30 tablet, Rfl: 5    Family History   Problem Relation Age of Onset     Heart Disease Father         a. fib     Heart Disease Mother      Coronary Artery Disease Mother 44.00        a second at 50+     No Known Problems Brother      No Known Problems Daughter      No Known Problems Son      Heart Disease Maternal Grandmother      Heart Disease Maternal Grandfather      No Known Problems Brother        Social History     Socioeconomic History     Marital status:      Spouse name: Not on file     Number of children: Not on file     Years of education: Not on file     Highest education level: Not on file   Occupational History     Not on file   Tobacco Use     Smoking status: Never     Passive exposure: Never     Smokeless tobacco: Never   Vaping Use     Vaping status: Never Used   Substance and Sexual Activity     Alcohol use: Yes     Comment: Alcoholic Drinks/day: every  so often; 2-3 drinks per week     Drug use: No     Sexual activity: Yes      Partners: Male     Birth control/protection: Condom   Other Topics Concern     Not on file   Social History Narrative     Not on file     Social Determinants of Health     Financial Resource Strain: Low Risk  (11/9/2023)    Financial Resource Strain      Within the past 12 months, have you or your family members you live with been unable to get utilities (heat, electricity) when it was really needed?: No   Food Insecurity: Low Risk  (11/9/2023)    Food Insecurity      Within the past 12 months, did you worry that your food would run out before you got money to buy more?: No      Within the past 12 months, did the food you bought just not last and you didn t have money to get more?: No   Transportation Needs: Low Risk  (11/9/2023)    Transportation Needs      Within the past 12 months, has lack of transportation kept you from medical appointments, getting your medicines, non-medical meetings or appointments, work, or from getting things that you need?: No   Physical Activity: Not on file   Stress: Not on file (2/26/2021)   Social Connections: Not on file   Interpersonal Safety: Low Risk  (11/9/2023)    Interpersonal Safety      Do you feel physically and emotionally safe where you currently live?: Yes      Within the past 12 months, have you been hit, slapped, kicked or otherwise physically hurt by someone?: No      Within the past 12 months, have you been humiliated or emotionally abused in other ways by your partner or ex-partner?: No   Housing Stability: Low Risk  (11/9/2023)    Housing Stability      Do you have housing? : Yes      Are you worried about losing your housing?: No       10 point Review of Systems is negative except for right hallux fracture which is noted in HPI.     Pulse 77   Temp 98.4  F (36.9  C)   Resp 16   SpO2 99%     BMI= There is no height or weight on file to calculate BMI.    OBJECTIVE:  General appearance: Patient is alert and fully cooperative with history & exam.  No sign of  distress is noted during the visit.    Vascular: Dorsalis pedis and posterior tibial pulses are palpable. There is pedal hair growth right.  CFT < 3 sec from anterior tibial surface to distal digits right.  Mild edema first MPJ right foot.  Dermatologic: Turgor and texture are within normal limits. No coloration or temperature changes. No primary or secondary lesions noted.  Neurologic: All epicritic and proprioceptive sensations are grossly intact right.  Musculoskeletal: Pain to palpation along base of the right hallux and distal first metatarsal right foot.  No pain along plantar first MPJ along sesamoids or with range of motion first MPJ right foot.      Imaging:     XR Foot 3 Views Standing Right    Result Date: 9/30/2024  Minimally displaced fracture along the medial base of the proximal phalanx of the hallux which appears to be intra-articular.          Again, thank you for allowing me to participate in the care of your patient.        Sincerely,        Robert Crane DPM

## 2024-10-15 NOTE — PATIENT INSTRUCTIONS
Please call to schedule the CT of your foot [phone]    We will call you with results    Remain non weight bearing at all times with a rolling knee walker and CAM boot.  Wear your CAM boot anytime you are up and out of bed.

## 2024-10-23 ENCOUNTER — HOSPITAL ENCOUNTER (OUTPATIENT)
Dept: CT IMAGING | Facility: CLINIC | Age: 34
Discharge: HOME | End: 2024-10-23
Attending: PODIATRIST | Admitting: PODIATRIST
Payer: COMMERCIAL

## 2024-10-23 DIAGNOSIS — S92.414A CLOSED NONDISPLACED FRACTURE OF PROXIMAL PHALANX OF RIGHT GREAT TOE, INITIAL ENCOUNTER: ICD-10-CM

## 2024-10-23 PROCEDURE — 73700 CT LOWER EXTREMITY W/O DYE: CPT | Mod: RT

## 2024-10-29 ENCOUNTER — VIRTUAL VISIT (OUTPATIENT)
Dept: PODIATRY | Facility: CLINIC | Age: 34
End: 2024-10-29
Payer: COMMERCIAL

## 2024-10-29 VITALS — BODY MASS INDEX: 22.49 KG/M2 | WEIGHT: 135 LBS | HEIGHT: 65 IN

## 2024-10-29 DIAGNOSIS — S92.414A CLOSED NONDISPLACED FRACTURE OF PROXIMAL PHALANX OF RIGHT GREAT TOE, INITIAL ENCOUNTER: Primary | ICD-10-CM

## 2024-10-29 PROCEDURE — 99441 PR PHYSICIAN TELEPHONE EVALUATION 5-10 MIN: CPT | Mod: 93 | Performed by: PODIATRIST

## 2024-10-29 ASSESSMENT — PAIN SCALES - GENERAL: PAINLEVEL_OUTOF10: MODERATE PAIN (5)

## 2024-10-29 NOTE — PATIENT INSTRUCTIONS
Remain non weight bearing at all times with a rolling knee walker and CAM boot.  Wear your CAM boot anytime you are up and out of bed.

## 2024-10-29 NOTE — PROGRESS NOTES
FOOT AND ANKLE SURGERY/PODIATRY Progress Note      ASSESSMENT: Closed minimally displaced fracture proximal phalanx right hallux     Type of service:  Telephone Visit       Telephone Start and End Time : 8:54/9:02    Originating Location (pt. Location):  home      Distant Location :  [facility]       Mode of Communication: Telephone    TREATMENT:  CT right foot: Small mildly displaced fracture of the medial aspect of the base of the proximal phalanx of the great toe.     -I reviewed the above CT report with the patient via phone today.  I also visualized the CT images which indicate a very small fracture fragment along the medial base of the proximal phalanx of the right hallux.      -Discussed with the patient that due to the small size of the fracture fragment ORIF is likely not possible.  We discussed removal of the fracture fragment if symptoms persist.  Risks of surgical treatment include but not limited to ongoing symptoms and need for additional surgical treatment.    -I would like to avoid surgical treatment if possible and recommend she continue to remain nonweightbearing with knee walker and cam boot for stability.    -I have asked her to follow-up with me in 3 weeks    Robert Crane DPM  [facility]      HPI: Regina Nguyen was seen again today for telephone visit to discuss her recent CT scan report.  Patient reports she is remain nonweightbearing with a knee walker and cam boot and does admit having mild pain with ambulation on the right foot using the cam boot.  She would like to proceed with surgical treatment of the fracture fragment to have it removed at this time.    Past Medical History:   Diagnosis Date    Anemia     Anxiety disorder     anxiety    Cervical high risk HPV (human papillomavirus) test positive 04/27/2021    3/25/13 ASCUS, neg HPV @ 23 yo 2/4/14 ASCUS, neg HPV @ 24 yo 11/15/17 NIL 4/27/21 NIL pap, +HR HPV (not 16/18). Plan:  cotest in 1 year    Disease of thyroid gland     hyperthyroidism; postpartum    Thyroiditis        Past Surgical History:   Procedure Laterality Date    APPENDECTOMY      7th grade    IR BLOOD PATCH  5/20/2017    LAPAROSCOPY DIAGNOSTIC (GYN) Bilateral 11/15/2023    Procedure: DIAGNOSTIC LAPAROSCOPY WITH BILATERAL SALPINGECTOMY , HYSTEROSCOPY DILATION AND CURETTAGE POLYPECTOMY;  Surgeon: Alli, Ricky Traore MD;  Location: [facility]       Allergies   Allergen Reactions    Benadryl [Diphenhydramine] Unknown         Current Outpatient Medications:     ibuprofen (ADVIL/MOTRIN) 600 MG tablet, Take 1 tablet (600 mg) by mouth every 6 hours as needed (Patient not taking: Reported on 9/11/2024), Disp: 30 tablet, Rfl: 1    venlafaxine (EFFEXOR-XR) 75 MG 24 hr capsule, Take 1 capsule (75 mg) by mouth daily (Patient not taking: Reported on 9/11/2024), Disp: 30 capsule, Rfl: 2    zolpidem (AMBIEN) 5 MG tablet, TAKE ONE TABLET BY MOUTH AT BEDTIME AS NEEDED FOR SLEEP (Patient not taking: Reported on 9/11/2024), Disp: 30 tablet, Rfl: 5    Review of Systems - 10 point Review of Systems is negative except for right foot fracture which is noted in HPI.    Imaging:     CT Foot Right w/o Contrast    Result Date: 10/24/2024  EXAM: CT FOOT RIGHT W/O CONTRAST LOCATION: [facility] DATE: 10/23/2024 INDICATION: Displaced fracture right hallux proximal phalanx, foot pain. COMPARISON: 10/15/2024 radiographs. TECHNIQUE: Noncontrast. Axial, sagittal and coronal thin-section reconstruction. Dose reduction techniques were used. FINDINGS: BONES: -Small mildly displaced fracture of the medial aspect of the base of the proximal phalanx of the great toe, as seen on series 5 image 19 and series 2 image 81. No evidence of osseous bridging at the fracture site. No evidence of additional fractures. Normal alignment. No degenerative or erosive arthritic change. SOFT TISSUES: -Normal.     IMPRESSION: 1.  Small mildly  displaced fracture of the medial aspect of the base of the proximal phalanx of the great toe.     XR Foot 3 Views Standing Right    Result Date: 10/15/2024  Interval increased lucency along the fracture fragment medial base of the proximal phalanx of the hallux which is intra-articular.

## 2024-10-29 NOTE — NURSING NOTE
Current patient location: [address] [place] [id number]    Is the patient currently in the state of [place]? YES    Visit mode:TELEPHONE    If the visit is dropped, the patient can be reconnected by: TELEPHONE VISIT: Phone number: [phone]    Will anyone else be joining the visit? NO  (If patient encounters technical issues they should call [phone]    Are changes needed to the allergy or medication list? No    Are refills needed on medications prescribed by this physician? NO    Rooming Documentation:  Questionnaire(s) completed    Reason for visit: NELL DURAND

## 2024-10-29 NOTE — LETTER
10/29/2024      Regina Nguyen  [address] [place] [address]      Dear Colleague,    Thank you for referring your patient, Regina Nguyen, to the [facility]. Please see a copy of my visit note below.    FOOT AND ANKLE SURGERY/PODIATRY Progress Note      ASSESSMENT: Closed minimally displaced fracture proximal phalanx right hallux     Type of service:  Telephone Visit       Telephone Start and End Time : 8:54/9:02    Originating Location (pt. Location):  home      Distant Location :  [facility]       Mode of Communication: Telephone    TREATMENT:  CT right foot: Small mildly displaced fracture of the medial aspect of the base of the proximal phalanx of the great toe.     -I reviewed the above CT report with the patient via phone today.  I also visualized the CT images which indicate a very small fracture fragment along the medial base of the proximal phalanx of the right hallux.      -Discussed with the patient that due to the small size of the fracture fragment ORIF is likely not possible.  We discussed removal of the fracture fragment if symptoms persist.  Risks of surgical treatment include but not limited to ongoing symptoms and need for additional surgical treatment.    -I would like to avoid surgical treatment if possible and recommend she continue to remain nonweightbearing with knee walker and cam boot for stability.    -I have asked her to follow-up with me in 3 weeks    Robert Crane DPM  [facility]      HPI: Regina Nguyen was seen again today for telephone visit to discuss her recent CT scan report.  Patient reports she is remain nonweightbearing with a knee walker and cam boot and does admit having mild pain with ambulation on the right foot using the cam boot.  She would like to proceed with surgical treatment of the fracture fragment to have it removed at this time.    Past Medical History:    Diagnosis Date     Anemia      Anxiety disorder     anxiety     Cervical high risk HPV (human papillomavirus) test positive 04/27/2021    3/25/13 ASCUS, neg HPV @ 23 yo 2/4/14 ASCUS, neg HPV @ 22 yo 11/15/17 NIL 4/27/21 NIL pap, +HR HPV (not 16/18). Plan: cotest in 1 year     Disease of thyroid gland     hyperthyroidism; postpartum     Thyroiditis        Past Surgical History:   Procedure Laterality Date     APPENDECTOMY      7th grade     IR BLOOD PATCH  5/20/2017     LAPAROSCOPY DIAGNOSTIC (GYN) Bilateral 11/15/2023    Procedure: DIAGNOSTIC LAPAROSCOPY WITH BILATERAL SALPINGECTOMY , HYSTEROSCOPY DILATION AND CURETTAGE POLYPECTOMY;  Surgeon: Ricky Carson MD;  Location: [facility]       Allergies   Allergen Reactions     Benadryl [Diphenhydramine] Unknown         Current Outpatient Medications:      ibuprofen (ADVIL/MOTRIN) 600 MG tablet, Take 1 tablet (600 mg) by mouth every 6 hours as needed (Patient not taking: Reported on 9/11/2024), Disp: 30 tablet, Rfl: 1     venlafaxine (EFFEXOR-XR) 75 MG 24 hr capsule, Take 1 capsule (75 mg) by mouth daily (Patient not taking: Reported on 9/11/2024), Disp: 30 capsule, Rfl: 2     zolpidem (AMBIEN) 5 MG tablet, TAKE ONE TABLET BY MOUTH AT BEDTIME AS NEEDED FOR SLEEP (Patient not taking: Reported on 9/11/2024), Disp: 30 tablet, Rfl: 5    Review of Systems - 10 point Review of Systems is negative except for right foot fracture which is noted in HPI.    Imaging:     CT Foot Right w/o Contrast    Result Date: 10/24/2024  EXAM: CT FOOT RIGHT W/O CONTRAST LOCATION: [facility] DATE: 10/23/2024 INDICATION: Displaced fracture right hallux proximal phalanx, foot pain. COMPARISON: 10/15/2024 radiographs. TECHNIQUE: Noncontrast. Axial, sagittal and coronal thin-section reconstruction. Dose reduction techniques were used. FINDINGS: BONES: -Small mildly displaced fracture of the medial aspect of the base of the proximal phalanx of the great toe,  as seen on series 5 image 19 and series 2 image 81. No evidence of osseous bridging at the fracture site. No evidence of additional fractures. Normal alignment. No degenerative or erosive arthritic change. SOFT TISSUES: -Normal.     IMPRESSION: 1.  Small mildly displaced fracture of the medial aspect of the base of the proximal phalanx of the great toe.     XR Foot 3 Views Standing Right    Result Date: 10/15/2024  Interval increased lucency along the fracture fragment medial base of the proximal phalanx of the hallux which is intra-articular.              Again, thank you for allowing me to participate in the care of your patient.        Sincerely,        Robert Crane DPM

## 2024-11-02 ENCOUNTER — HOSPITAL ENCOUNTER (EMERGENCY)
Facility: CLINIC | Age: 34
Discharge: HOME | End: 2024-11-02
Admitting: PHYSICIAN ASSISTANT
Payer: COMMERCIAL

## 2024-11-02 ENCOUNTER — APPOINTMENT (OUTPATIENT)
Dept: CT IMAGING | Facility: CLINIC | Age: 34
End: 2024-11-02
Attending: PHYSICIAN ASSISTANT
Payer: COMMERCIAL

## 2024-11-02 VITALS
BODY MASS INDEX: 23.05 KG/M2 | HEART RATE: 89 BPM | TEMPERATURE: 98.3 F | OXYGEN SATURATION: 100 % | DIASTOLIC BLOOD PRESSURE: 79 MMHG | RESPIRATION RATE: 18 BRPM | HEIGHT: 64 IN | WEIGHT: 135 LBS | SYSTOLIC BLOOD PRESSURE: 131 MMHG

## 2024-11-02 DIAGNOSIS — N39.0 UTI (URINARY TRACT INFECTION): ICD-10-CM

## 2024-11-02 LAB
ALBUMIN UR-MCNC: 100 MG/DL
ANION GAP SERPL CALCULATED.3IONS-SCNC: 11 MMOL/L (ref 7–15)
APPEARANCE UR: ABNORMAL
BACTERIA #/AREA URNS HPF: ABNORMAL /HPF
BILIRUB UR QL STRIP: NEGATIVE
BUN SERPL-MCNC: 11.1 MG/DL (ref 6–20)
CALCIUM SERPL-MCNC: 9.9 MG/DL (ref 8.8–10.4)
CHLORIDE SERPL-SCNC: 99 MMOL/L (ref 98–107)
CLUE CELLS: ABNORMAL
COLOR UR AUTO: YELLOW
CREAT SERPL-MCNC: 0.73 MG/DL (ref 0.51–0.95)
EGFRCR SERPLBLD CKD-EPI 2021: >90 ML/MIN/1.73M2
ERYTHROCYTE [DISTWIDTH] IN BLOOD BY AUTOMATED COUNT: 12.3 % (ref 10–15)
GLUCOSE SERPL-MCNC: 79 MG/DL (ref 70–99)
GLUCOSE UR STRIP-MCNC: NEGATIVE MG/DL
HCG SERPL QL: NEGATIVE
HCO3 SERPL-SCNC: 26 MMOL/L (ref 22–29)
HCT VFR BLD AUTO: 42.9 % (ref 35–47)
HGB BLD-MCNC: 14.5 G/DL (ref 11.7–15.7)
HGB UR QL STRIP: ABNORMAL
KETONES UR STRIP-MCNC: NEGATIVE MG/DL
LEUKOCYTE ESTERASE UR QL STRIP: ABNORMAL
MCH RBC QN AUTO: 32.2 PG (ref 26.5–33)
MCHC RBC AUTO-ENTMCNC: 33.8 G/DL (ref 31.5–36.5)
MCV RBC AUTO: 95 FL (ref 78–100)
MUCOUS THREADS #/AREA URNS LPF: PRESENT /LPF
NITRATE UR QL: POSITIVE
PH UR STRIP: 6.5 [PH] (ref 5–7)
PLATELET # BLD AUTO: 223 10E3/UL (ref 150–450)
POTASSIUM SERPL-SCNC: 4.4 MMOL/L (ref 3.4–5.3)
RBC # BLD AUTO: 4.5 10E6/UL (ref 3.8–5.2)
RBC URINE: 20 /HPF
SODIUM SERPL-SCNC: 136 MMOL/L (ref 135–145)
SP GR UR STRIP: 1.03 (ref 1–1.03)
SQUAMOUS EPITHELIAL: 2 /HPF
TRICHOMONAS, WET PREP: ABNORMAL
UROBILINOGEN UR STRIP-MCNC: <2 MG/DL
WBC # BLD AUTO: 7.6 10E3/UL (ref 4–11)
WBC URINE: >182 /HPF
WBC'S/HIGH POWER FIELD, WET PREP: ABNORMAL
YEAST, WET PREP: ABNORMAL

## 2024-11-02 PROCEDURE — 85018 HEMOGLOBIN: CPT | Performed by: PHYSICIAN ASSISTANT

## 2024-11-02 PROCEDURE — 96365 THER/PROPH/DIAG IV INF INIT: CPT

## 2024-11-02 PROCEDURE — 74176 CT ABD & PELVIS W/O CONTRAST: CPT

## 2024-11-02 PROCEDURE — 87210 SMEAR WET MOUNT SALINE/INK: CPT | Performed by: PHYSICIAN ASSISTANT

## 2024-11-02 PROCEDURE — 87186 SC STD MICRODIL/AGAR DIL: CPT | Performed by: PHYSICIAN ASSISTANT

## 2024-11-02 PROCEDURE — 80048 BASIC METABOLIC PNL TOTAL CA: CPT | Performed by: PHYSICIAN ASSISTANT

## 2024-11-02 PROCEDURE — 99284 EMERGENCY DEPT VISIT MOD MDM: CPT | Mod: 25

## 2024-11-02 PROCEDURE — 36415 COLL VENOUS BLD VENIPUNCTURE: CPT | Performed by: PHYSICIAN ASSISTANT

## 2024-11-02 PROCEDURE — 250N000011 HC RX IP 250 OP 636: Performed by: PHYSICIAN ASSISTANT

## 2024-11-02 PROCEDURE — 81003 URINALYSIS AUTO W/O SCOPE: CPT | Performed by: PHYSICIAN ASSISTANT

## 2024-11-02 PROCEDURE — 87491 CHLMYD TRACH DNA AMP PROBE: CPT | Performed by: PHYSICIAN ASSISTANT

## 2024-11-02 PROCEDURE — 84703 CHORIONIC GONADOTROPIN ASSAY: CPT | Performed by: PHYSICIAN ASSISTANT

## 2024-11-02 RX ORDER — CEFTRIAXONE 1 G/1
1 INJECTION, POWDER, FOR SOLUTION INTRAMUSCULAR; INTRAVENOUS ONCE
Status: COMPLETED | OUTPATIENT
Start: 2024-11-02 | End: 2024-11-02

## 2024-11-02 RX ORDER — CEFDINIR 300 MG/1
300 CAPSULE ORAL 2 TIMES DAILY
Qty: 14 CAPSULE | Refills: 0 | Status: SHIPPED | OUTPATIENT
Start: 2024-11-02 | End: 2024-11-09

## 2024-11-02 RX ORDER — PHENAZOPYRIDINE HYDROCHLORIDE 200 MG/1
200 TABLET, FILM COATED ORAL 3 TIMES DAILY
Qty: 9 TABLET | Refills: 0 | Status: SHIPPED | OUTPATIENT
Start: 2024-11-02 | End: 2024-11-05

## 2024-11-02 RX ADMIN — CEFTRIAXONE 1 G: 1 INJECTION, POWDER, FOR SOLUTION INTRAMUSCULAR; INTRAVENOUS at 14:42

## 2024-11-02 ASSESSMENT — COLUMBIA-SUICIDE SEVERITY RATING SCALE - C-SSRS
6. HAVE YOU EVER DONE ANYTHING, STARTED TO DO ANYTHING, OR PREPARED TO DO ANYTHING TO END YOUR LIFE?: NO
2. HAVE YOU ACTUALLY HAD ANY THOUGHTS OF KILLING YOURSELF IN THE PAST MONTH?: NO
1. IN THE PAST MONTH, HAVE YOU WISHED YOU WERE DEAD OR WISHED YOU COULD GO TO SLEEP AND NOT WAKE UP?: NO

## 2024-11-02 ASSESSMENT — ACTIVITIES OF DAILY LIVING (ADL)
ADLS_ACUITY_SCORE: 0

## 2024-11-02 NOTE — ED TRIAGE NOTES
Patient presents to ED with pelvic pain and pressure that began yesterday, pain has extended to lower abdomen and lower back, pain is worst while sitting and trying to urinate or defecate.  Denise Mcallister RN.......11/2/2024 12:05 PM     Triage Assessment (Adult)       Row Name 11/02/24 1204          Triage Assessment    Airway WDL WDL        Respiratory WDL    Respiratory WDL WDL        Skin Circulation/Temperature WDL    Skin Circulation/Temperature WDL WDL        Cardiac WDL    Cardiac WDL WDL        Peripheral/Neurovascular WDL    Peripheral Neurovascular WDL WDL        Cognitive/Neuro/Behavioral WDL    Cognitive/Neuro/Behavioral WDL WDL

## 2024-11-02 NOTE — DISCHARGE INSTRUCTIONS
As we discussed, you have a bladder infection or urinary tract infection.  We will send you home with the antibiotic cefdinir which she should take and finish as prescribed.  We will call you if we need to change her antibiotics based on the culture.  If at any point you develop fevers or chills, nausea or vomiting, worsening belly or back pain please return to the ER for further evaluation.  I will also send a prescription for Pyridium to help with your pain to the pharmacy.

## 2024-11-02 NOTE — ED PROVIDER NOTES
Emergency Department Encounter   NAME: Regina Nguyen ; AGE: 34 year old female ; YOB: 1990 ; MRN: [id number] ; PCP: Valerio Neves   ED PROVIDER: Tova Muñiz PA-C    Evaluation Date & Time:   11/2/2024 12:51 PM    CHIEF COMPLAINT:  Pelvic Pain      Impression and Plan   MDM: Regina Nguyen is a 34 year old female who presents to the ED for evaluation of lower abdominal pain.  The patient presents to the emergency department for evaluation of lower abdominal/pelvic pressure that is exacerbated by urinating that started yesterday.  Here in the ED, she is generally well-appearing and vitally stable.  She does have reproducible tenderness over her suprapubic bladder and pelvis, mildly more worse to the right pelvis.  Abdomen remains soft without evidence of peritonitis.  Certainly considered a broad differential and we discussed plan for urine, labs, and pelvic for initial assessment.  She declined anything for pain.    Pregnancy test is negative  -no concern for ectopic.  Her pelvic exam is overall reassuring.  No evidence of cervicitis or PID. Mild tenderness with palpation over right ovary though no fullness. Wet prep negative for yeast and BV.  Chlamydia/gonorrhea sent and pending though low suspicion that this would return positive.  Basic labs reassuring.  UA came back very convincing for infection -minimal contamination and it is nitrite positive with 500 leukocytes, many bacteria, and greater than 182 WBCs with 20 RBCs.  Urine culture sent and pending.  This would fit with patient's symptoms clinically with pain and pressure over suprapubic bladder and worsening discomfort with urination.  She has not had fever or vomiting, has no CVA tenderness and her vitals are reassuring.  No evidence of sepsis or pyelonephritis, however given her degree of pain did consider progression of the urinary tract.  Given this we will treat with a dose of IV Rocephin and a course of cefdinir.  CT of  her abdomen pelvis was ordered with contrast though CT tech did not administer this, however given urine that is very positive for infection suspect that this is the source.  She has already had an appendectomy.  CT shows no nephrolithiasis, hydronephrosis, or ureteral obstruction. No visible right ovarian mass. Low suspicion for ovarian torsion - she is well appearing and has not required anything for pain. I discussed this with the patient, and shared medical decision making that US is not indicated today.  Patient feels comfortable going home on p.o. antibiotics and following up in clinic.  We discussed supportive measures for home, prescription for Pyridium provided and we reviewed strict return precautions.  We will call her if we need to change antibiotics based on the culture.      Medical Decision Making  Obtained supplemental history:Supplemental history obtained?: No  Reviewed external records: External records reviewed?: No  Care impacted by chronic illness:Documented in Chart  Did you consider but not order tests?: Work up considered but not performed and documented in chart, if applicable  Did you interpret images independently?: Independent interpretation of ECG and images noted in documentation, when applicable.  Consultation discussion with other provider:Did you involve another provider (consultant, , pharmacy, etc.)?: No  Discharge. I prescribed additional prescription strength medication(s) as charted. See documentation for any additional details.    MIPS: Not Applicable      ED COURSE:  2:28 PM I met and introduced myself to the patient. I gathered initial history and performed my physical exam. We discussed plan for initial workup.   2:48 PM Josephine from CT states that she imaged patient without contrast.   4:07 PM  I rechecked the patient and discussed results, discharge, follow up, and reasons to return to the ED.     At the conclusion of the encounter I discussed the results of all the tests  and the disposition. The questions were answered. The patient or family acknowledged understanding and was agreeable with the care plan.    FINAL IMPRESSION:    ICD-10-CM    1. UTI (urinary tract infection)  N39.0             MEDICATIONS GIVEN IN THE EMERGENCY DEPARTMENT:  Medications   cefTRIAXone (ROCEPHIN) 1 g vial to attach to  mL bag for ADULTS or NS 50 mL bag for PEDS (0 g Intravenous Stopped 11/2/24 6082)         NEW PRESCRIPTIONS STARTED AT TODAY'S ED VISIT:  New Prescriptions    CEFDINIR (OMNICEF) 300 MG CAPSULE    Take 1 capsule (300 mg) by mouth 2 times daily for 7 days.    PHENAZOPYRIDINE (PYRIDIUM) 200 MG TABLET    Take 1 tablet (200 mg) by mouth 3 times daily for 3 days.         HPI   Use of Intrepreter: N/A     Regina Nguyen is a 34 year old female with a pertinent history of anemia, anxiety disorder, thyroiditis, cervical high risk HPV, who presents to the ED by private vehicle for evaluation of lower abdominal pain.    The patient presents to the emergency department for evaluation of lower abdominal pain that started yesterday.  She describes it as a pelvic pressure sensation that is significantly worse when she urinates.  It has been causing her such discomfort that she has been holding her urine.  She does not describe it as a burning sensation.  She has not had similar pain in the past.  No obvious blood in her urine though she does feel that it has a copper appearance.  She has not had fever, chills, nausea, vomiting or changes in bowel.  She has had a tubal ligation as well as a recent assessment for endometriosis which came back negative.  Her last menstrual period was 2 weeks ago.  No current vaginal bleeding.  No abnormal vaginal discharge, odor, or new partners.      REVIEW OF SYSTEMS:  Pertinent positive and negative symptoms per HPI.       Medical History     Past Medical History:   Diagnosis Date    Anemia     Anxiety disorder     Cervical high risk HPV (human papillomavirus)  "test positive 04/27/2021    Disease of thyroid gland     Thyroiditis        Past Surgical History:   Procedure Laterality Date    APPENDECTOMY      7th grade    IR BLOOD PATCH  5/20/2017    LAPAROSCOPY DIAGNOSTIC (GYN) Bilateral 11/15/2023    Procedure: DIAGNOSTIC LAPAROSCOPY WITH BILATERAL SALPINGECTOMY , HYSTEROSCOPY DILATION AND CURETTAGE POLYPECTOMY;  Surgeon: Ricky Carson MD;  Location: [facility]       Family History   Problem Relation Age of Onset    Heart Disease Father         a. fib    Heart Disease Mother     Coronary Artery Disease Mother 44.00        a second at 50+    No Known Problems Brother     No Known Problems Daughter     No Known Problems Son     Heart Disease Maternal Grandmother     Heart Disease Maternal Grandfather     No Known Problems Brother        Social History     Tobacco Use    Smoking status: Never     Passive exposure: Never    Smokeless tobacco: Never   Vaping Use    Vaping status: Never Used   Substance Use Topics    Alcohol use: Yes     Comment: Alcoholic Drinks/day: every  so often; 2-3 drinks per week    Drug use: No       cefdinir (OMNICEF) 300 MG capsule  phenazopyridine (PYRIDIUM) 200 MG tablet  ibuprofen (ADVIL/MOTRIN) 600 MG tablet  venlafaxine (EFFEXOR-XR) 75 MG 24 hr capsule  zolpidem (AMBIEN) 5 MG tablet          Physical Exam     First Vitals:  Patient Vitals for the past 24 hrs:   BP Temp Temp src Pulse Resp SpO2 Height Weight   11/02/24 1447 134/80 98.8  F (37.1  C) Oral 83 16 100 % -- --   11/02/24 1252 128/87 -- -- 97 16 100 % -- --   11/02/24 1203 (!) 158/86 97.9  F (36.6  C) Temporal 89 16 100 % 1.626 m (5' 4\") 61.2 kg (135 lb)         PHYSICAL EXAM:   Physical Exam  Vitals reviewed.   Constitutional:       General: She is not in acute distress.     Appearance: She is not ill-appearing, toxic-appearing or diaphoretic.   Cardiovascular:      Rate and Rhythm: Normal rate and regular rhythm.      Heart sounds: Normal heart sounds.   Pulmonary:      " Effort: Pulmonary effort is normal.      Breath sounds: Normal breath sounds.   Abdominal:      General: Abdomen is flat. Bowel sounds are normal. There is no distension.      Palpations: Abdomen is soft. There is no mass.      Tenderness: There is abdominal tenderness (suprapubic/pelvic tenderness, worse on the right side). There is no right CVA tenderness, left CVA tenderness, guarding or rebound.   Genitourinary:     Comments: External genitalia normal.  No abnormal vaginal discharge.  No CMT.  Mild discomfort over the right ovary though no fullness.  Left ovary nontender.  Neurological:      Mental Status: She is alert.             Results     LAB:  All pertinent labs reviewed and interpreted  Labs Ordered and Resulted from Time of ED Arrival to Time of ED Departure   ROUTINE UA WITH MICROSCOPIC REFLEX TO CULTURE - Abnormal       Result Value    Color Urine Yellow      Appearance Urine Turbid (*)     Glucose Urine Negative      Bilirubin Urine Negative      Ketones Urine Negative      Specific Gravity Urine 1.033 (*)     Blood Urine 0.2 mg/dL (*)     pH Urine 6.5      Protein Albumin Urine 100 (*)     Urobilinogen Urine <2.0      Nitrite Urine Positive (*)     Leukocyte Esterase Urine 500 Carl/uL (*)     Bacteria Urine Many (*)     Mucus Urine Present (*)     RBC Urine 20 (*)     WBC Urine >182 (*)     Squamous Epithelials Urine 2 (*)    WET PREPARATION - Abnormal    Trichomonas Absent      Yeast Absent      Clue Cells Absent      WBCs/high power field 3+ (*)    CBC WITH PLATELETS - Normal    WBC Count 7.6      RBC Count 4.50      Hemoglobin 14.5      Hematocrit 42.9      MCV 95      MCH 32.2      MCHC 33.8      RDW 12.3      Platelet Count 223     BASIC METABOLIC PANEL - Normal    Sodium 136      Potassium 4.4      Chloride 99      Carbon Dioxide (CO2) 26      Anion Gap 11      Urea Nitrogen 11.1      Creatinine 0.73      GFR Estimate >90      Calcium 9.9      Glucose 79     HCG QUALITATIVE PREGNANCY - Normal     hCG Serum Qualitative Negative     CHLAMYDIA TRACHOMATIS/NEISSERIA GONORRHOEAE BY PCR   URINE CULTURE       RADIOLOGY:  CT Abdomen Pelvis w/o Contrast   Final Result   IMPRESSION:    No acute findings or other explanation for symptoms.               Tova Muñiz PA-C   Emergency Medicine   [facility] EMERGENCY ROOM       Tova Muñiz PA-C  11/02/24 1072

## 2024-11-03 LAB
C TRACH DNA SPEC QL PROBE+SIG AMP: NEGATIVE
N GONORRHOEA DNA SPEC QL NAA+PROBE: NEGATIVE

## 2024-11-04 ENCOUNTER — VIRTUAL VISIT (OUTPATIENT)
Dept: FAMILY MEDICINE | Facility: CLINIC | Age: 34
End: 2024-11-04
Payer: COMMERCIAL

## 2024-11-04 DIAGNOSIS — N30.00 ACUTE CYSTITIS WITHOUT HEMATURIA: Primary | ICD-10-CM

## 2024-11-04 LAB
BACTERIA UR CULT: ABNORMAL
BACTERIA UR CULT: ABNORMAL

## 2024-11-04 PROCEDURE — 99213 OFFICE O/P EST LOW 20 MIN: CPT | Mod: 95 | Performed by: PHYSICIAN ASSISTANT

## 2024-11-04 PROCEDURE — G2211 COMPLEX E/M VISIT ADD ON: HCPCS | Mod: 95 | Performed by: PHYSICIAN ASSISTANT

## 2024-11-04 NOTE — PROGRESS NOTES
Regina is a 34 year old who is being evaluated via a billable video visit.    How would you like to obtain your AVS? [email or web address]  If the video visit is dropped, the invitation should be resent by: Text to cell phone: [phone]  Will anyone else be joining your video visit? No      If patient has telephone visit, have they been educated on video visit as preferred visit method and offered to change to video visit? NOT APPLICABLE        Instructions Relayed to Patient by Virtual Roomer:           Patient Confirmed they will join visit via: Text Link to Cell Phone  Reminded patient to ensure they were logged on to virtual visit by arrival time listed.   Asked if patient has flexibility to initiate visit sooner than arrival time: patient is unable to initiate visit earlier than arrival time     If pediatric virtual visit, ensured pediatric patient along with parent/guardian will be present for video visit.     Patient offered the website [email or web address] and/or phone number to [employer] Help line: [phone]       Assessment & Plan   Problem List Items Addressed This Visit    None  Visit Diagnoses       Acute cystitis without hematuria    -  Primary    Relevant Orders    UA with Microscopic - lab collect    Urine Culture Aerobic Bacterial - lab collect           Improving  Continue Cefdinir   If symptoms do not resolve fully post antibiotic treatment, please follow up for lab visit to leave another urinalysis      MED REC REQUIRED  Post Medication Reconciliation Status:         Subjective   Regina is a 34 year old, presenting for the following health issues:  Hospital F/U        11/4/2024     8:37 AM   Additional Questions   Roomed by Zhou   Accompanied by self         11/4/2024     8:37 AM   Patient Reported Additional Medications   Patient reports taking the following new medications No     HPI       Hospital Follow-up Visit:    Hospital/Nursing Home/IP Rehab Facility: [facility]  Hospital  Date of Admission: 11/02/2024  Date of Discharge: 11/02/2024  Reason(s) for Admission: Pelvic Pain   Was the patient in the ICU or did the patient experience delirium during hospitalization?  No  Do you have any other stressors you would like to discuss with your provider? No    Problems taking medications regularly:  None  Medication changes since discharge: Cefdinir (omnicef) 300 mg capsule, Phenazopyridine (pyridium) 200 mg tablet.   Problems adhering to non-medication therapy:  None    Summary of hospitalization:  [facility] discharge summary reviewed  Diagnostic Tests/Treatments reviewed.  Follow up needed: none  Other Healthcare Providers Involved in Patient s Care:         None  Update since discharge: improved.  Patient reports that dysuria and low back pain are improving. Patient denies fever, N/V,     Plan of care communicated with patient             Review of Systems  Constitutional, HEENT, cardiovascular, pulmonary, gi and gu systems are negative, except as otherwise noted.      Objective           Vitals:  No vitals were obtained today due to virtual visit.    Physical Exam   GENERAL: alert and no distress  EYES: Eyes grossly normal to inspection.  No discharge or erythema, or obvious scleral/conjunctival abnormalities.  RESP: No audible wheeze, cough, or visible cyanosis.    SKIN: Visible skin clear. No significant rash, abnormal pigmentation or lesions.  NEURO: Cranial nerves grossly intact.  Mentation and speech appropriate for age.  PSYCH: Appropriate affect, tone, and pace of words    Admission on 11/02/2024, Discharged on 11/02/2024   Component Date Value Ref Range Status    WBC Count 11/02/2024 7.6  4.0 - 11.0 10e3/uL Final    RBC Count 11/02/2024 4.50  3.80 - 5.20 10e6/uL Final    Hemoglobin 11/02/2024 14.5  11.7 - 15.7 g/dL Final    Hematocrit 11/02/2024 42.9  35.0 - 47.0 % Final    MCV 11/02/2024 95  78 - 100 fL Final    MCH 11/02/2024 32.2  26.5 - 33.0 pg Final     MCHC 11/02/2024 33.8  31.5 - 36.5 g/dL Final    RDW 11/02/2024 12.3  10.0 - 15.0 % Final    Platelet Count 11/02/2024 223  150 - 450 10e3/uL Final    Sodium 11/02/2024 136  135 - 145 mmol/L Final    Potassium 11/02/2024 4.4  3.4 - 5.3 mmol/L Final    Chloride 11/02/2024 99  98 - 107 mmol/L Final    Carbon Dioxide (CO2) 11/02/2024 26  22 - 29 mmol/L Final    Anion Gap 11/02/2024 11  7 - 15 mmol/L Final    Urea Nitrogen 11/02/2024 11.1  6.0 - 20.0 mg/dL Final    Creatinine 11/02/2024 0.73  0.51 - 0.95 mg/dL Final    GFR Estimate 11/02/2024 >90  >60 mL/min/1.73m2 Final    eGFR calculated using 2021 CKD-EPI equation.    Calcium 11/02/2024 9.9  8.8 - 10.4 mg/dL Final    Reference intervals for this test were updated on 7/16/2024 to reflect our healthy population more accurately. There may be differences in the flagging of prior results with similar values performed with this method. Those prior results can be interpreted in the context of the updated reference intervals.    Glucose 11/02/2024 79  70 - 99 mg/dL Final    hCG Serum Qualitative 11/02/2024 Negative  Negative Final    This test is for screening purposes.  Results should be interpreted along with the clinical picture.  Confirmation testing is available if warranted by ordering TSR600, HCG Quantitative Pregnancy.    Color Urine 11/02/2024 Yellow  Colorless, Straw, Light Yellow, Yellow Final    Appearance Urine 11/02/2024 Turbid (A)  Clear Final    Glucose Urine 11/02/2024 Negative  Negative mg/dL Final    Bilirubin Urine 11/02/2024 Negative  Negative Final    Ketones Urine 11/02/2024 Negative  Negative mg/dL Final    Specific Gravity Urine 11/02/2024 1.033 (H)  1.001 - 1.030 Final    Blood Urine 11/02/2024 0.2 mg/dL (A)  Negative Final    pH Urine 11/02/2024 6.5  5.0 - 7.0 Final    Protein Albumin Urine 11/02/2024 100 (A)  Negative mg/dL Final    Urobilinogen Urine 11/02/2024 <2.0  <2.0 mg/dL Final    Nitrite Urine 11/02/2024 Positive (A)  Negative Final     Leukocyte Esterase Urine 11/02/2024 500 Carl/uL (A)  Negative Final    Bacteria Urine 11/02/2024 Many (A)  None Seen /HPF Final    Mucus Urine 11/02/2024 Present (A)  None Seen /LPF Final    RBC Urine 11/02/2024 20 (H)  <=2 /HPF Final    WBC Urine 11/02/2024 >182 (H)  <=5 /HPF Final    Squamous Epithelials Urine 11/02/2024 2 (H)  <=1 /HPF Final    Trichomonas 11/02/2024 Absent  Absent Final    Yeast 11/02/2024 Absent  Absent Final    Clue Cells 11/02/2024 Absent  Absent Final    WBCs/high power field 11/02/2024 3+ (A)  None Final    Chlamydia Trachomatis 11/02/2024 Negative  Negative Final    Negative for C. trachomatis rRNA by transcription mediated amplification.   A negative result by transcription mediated amplification does not preclude the presence of infection because results are dependent on proper and adequate collection, absence of inhibitors and sufficient rRNA to be detected.    Neisseria gonorrhoeae 11/02/2024 Negative  Negative Final    Negative for N. gonorrhoeae rRNA by transcription mediated amplification. A negative result by transcription mediated amplification does not preclude the presence of C. trachomatis infection because results are dependent on proper and adequate collection, absence of inhibitors and sufficient rRNA to be detected.    Culture 11/02/2024 >100,000 CFU/mL Gram negative bacilli (A)   Preliminary    Culture 11/02/2024 >100,000 CFU/mL Gram negative bacilli (A)   Preliminary         Video-Visit Details    Type of service:  Video Visit   Originating Location (pt. Location): Home    Distant Location (provider location):  On-site  Platform used for Video Visit: Ellen  Signed Electronically by: Jesika Baxter PA-C

## 2024-11-05 ENCOUNTER — TELEPHONE (OUTPATIENT)
Dept: FAMILY MEDICINE | Facility: CLINIC | Age: 34
End: 2024-11-05
Payer: COMMERCIAL

## 2024-11-05 NOTE — TELEPHONE ENCOUNTER
Patient calling regarding questions about her urine culture. Patient is wondering what it means when it states E.Coli. Writer educated patient that this is the bacteria found in the urine that is causing the infection and is being treated with antibiotics. Writer educated patient that this is a common finding in urine cultures for women due to anatomy and proximity of rectum to urethra. Patient denies any further questions at this time.    CA Zaidi, RN  [facility]

## 2024-11-26 ENCOUNTER — ANCILLARY PROCEDURE (OUTPATIENT)
Dept: GENERAL RADIOLOGY | Facility: CLINIC | Age: 34
End: 2024-11-26
Attending: PODIATRIST
Payer: COMMERCIAL

## 2024-11-26 ENCOUNTER — MYC MEDICAL ADVICE (OUTPATIENT)
Dept: PODIATRY | Facility: CLINIC | Age: 34
End: 2024-11-26

## 2024-11-26 ENCOUNTER — OFFICE VISIT (OUTPATIENT)
Dept: PODIATRY | Facility: CLINIC | Age: 34
End: 2024-11-26
Payer: COMMERCIAL

## 2024-11-26 VITALS — DIASTOLIC BLOOD PRESSURE: 50 MMHG | OXYGEN SATURATION: 99 % | SYSTOLIC BLOOD PRESSURE: 100 MMHG | HEART RATE: 66 BPM

## 2024-11-26 DIAGNOSIS — S92.414A CLOSED NONDISPLACED FRACTURE OF PROXIMAL PHALANX OF RIGHT GREAT TOE, INITIAL ENCOUNTER: Primary | ICD-10-CM

## 2024-11-26 DIAGNOSIS — S92.414A CLOSED NONDISPLACED FRACTURE OF PROXIMAL PHALANX OF RIGHT GREAT TOE, INITIAL ENCOUNTER: ICD-10-CM

## 2024-11-26 PROCEDURE — 99214 OFFICE O/P EST MOD 30 MIN: CPT | Performed by: PODIATRIST

## 2024-11-26 PROCEDURE — 73630 X-RAY EXAM OF FOOT: CPT | Mod: RT | Performed by: PODIATRIST

## 2024-11-26 ASSESSMENT — PAIN SCALES - GENERAL: PAINLEVEL_OUTOF10: SEVERE PAIN (6)

## 2024-11-26 NOTE — LETTER
11/26/2024      Regina Nguyen  [address] [address] [facility] [address]      Dear Colleague,    Thank you for referring your patient, Regina Nguyen, to the [facility]. Please see a copy of my visit note below.        FOOT AND ANKLE SURGERY/PODIATRY PROGRESS NOTE        ASSESSMENT: Closed minimally displaced fracture proximal phalanx right hallux       TREATMENT:  -I discussed the patient that she continues to have discomfort along the medial plantar base of the proximal phalanx of the right hallux on exam today.    -I reviewed the patient's weightbearing x-rays from today which indicate bony healing as compared to the previous visit but continues to indicate a mildly displaced intra-articular fracture along the medial base of the proximal phalanx of the hallux.    -Based on the above, we again discussed that because she has shown evidence of bony healing it is not unreasonable to remain nonweightbearing for additional 2 to 3 weeks and give the fracture site more time for consolidation.  We also discussed surgical treatment to include removal of the fracture fragment and smoothing of the base of the proximal phalanx.  Risks of surgery include but not limited to ongoing symptoms, infection and long-term degenerative changes.    -After discussion of the above the patient like to discuss further with her .  I have asked her to contact my office with her decision on course of treatment.    -Patient's questions invited and answered. She was encouraged to call my office with any further questions or concerns.     30 minutes spent on the day of encounter doing chart review, history and exam, documentation, and further activities as noted.     Robert Crane DPM  [facility] Podiatry/Foot & Ankle Surgery      HPI: Regina Nguyen was seen again today for a close displaced fracture proximal phalanx right hallux.  Patient has been nonweightbearing with knee walker and  cam boot and states that she continues to have discomfort along the base of the big toe on the right foot.    Past Medical History:   Diagnosis Date     Anemia      Anxiety disorder     anxiety     Cervical high risk HPV (human papillomavirus) test positive 04/27/2021    3/25/13 ASCUS, neg HPV @ 23 yo 2/4/14 ASCUS, neg HPV @ 22 yo 11/15/17 NIL 4/27/21 NIL pap, +HR HPV (not 16/18). Plan: cotest in 1 year     Disease of thyroid gland     hyperthyroidism; postpartum     Thyroiditis        Past Surgical History:   Procedure Laterality Date     APPENDECTOMY      7th grade     IR BLOOD PATCH  5/20/2017     LAPAROSCOPY DIAGNOSTIC (GYN) Bilateral 11/15/2023    Procedure: DIAGNOSTIC LAPAROSCOPY WITH BILATERAL SALPINGECTOMY , HYSTEROSCOPY DILATION AND CURETTAGE POLYPECTOMY;  Surgeon: Ricky Carson MD;  Location: [facility] OR       Allergies   Allergen Reactions     Benadryl [Diphenhydramine] Unknown         Current Outpatient Medications:      ibuprofen (ADVIL/MOTRIN) 600 MG tablet, Take 1 tablet (600 mg) by mouth every 6 hours as needed, Disp: 30 tablet, Rfl: 1     venlafaxine (EFFEXOR-XR) 75 MG 24 hr capsule, Take 1 capsule (75 mg) by mouth daily, Disp: 30 capsule, Rfl: 2     zolpidem (AMBIEN) 5 MG tablet, TAKE ONE TABLET BY MOUTH AT BEDTIME AS NEEDED FOR SLEEP, Disp: 30 tablet, Rfl: 5    Family History   Problem Relation Age of Onset     Heart Disease Father         a. fib     Heart Disease Mother      Coronary Artery Disease Mother 44.00        a second at 50+     No Known Problems Brother      No Known Problems Daughter      No Known Problems Son      Heart Disease Maternal Grandmother      Heart Disease Maternal Grandfather      No Known Problems Brother        Social History     Socioeconomic History     Marital status:      Spouse name: Not on file     Number of children: Not on file     Years of education: Not on file     Highest education level: Not on file   Occupational History     Not on  file   Tobacco Use     Smoking status: Never     Passive exposure: Never     Smokeless tobacco: Never   Vaping Use     Vaping status: Never Used   Substance and Sexual Activity     Alcohol use: Yes     Comment: Alcoholic Drinks/day: every  so often; 2-3 drinks per week     Drug use: No     Sexual activity: Yes     Partners: Male     Birth control/protection: Condom   Other Topics Concern     Not on file   Social History Narrative     Not on file     Social Drivers of Health     Financial Resource Strain: Low Risk  (11/9/2023)    Financial Resource Strain      Within the past 12 months, have you or your family members you live with been unable to get utilities (heat, electricity) when it was really needed?: No   Food Insecurity: Low Risk  (11/9/2023)    Food Insecurity      Within the past 12 months, did you worry that your food would run out before you got money to buy more?: No      Within the past 12 months, did the food you bought just not last and you didn t have money to get more?: No   Transportation Needs: Low Risk  (11/9/2023)    Transportation Needs      Within the past 12 months, has lack of transportation kept you from medical appointments, getting your medicines, non-medical meetings or appointments, work, or from getting things that you need?: No   Physical Activity: Not on file   Stress: Not on file (2/26/2021)   Social Connections: Not on file   Interpersonal Safety: Low Risk  (11/9/2023)    Interpersonal Safety      Do you feel physically and emotionally safe where you currently live?: Yes      Within the past 12 months, have you been hit, slapped, kicked or otherwise physically hurt by someone?: No      Within the past 12 months, have you been humiliated or emotionally abused in other ways by your partner or ex-partner?: No   Housing Stability: Low Risk  (11/9/2023)    Housing Stability      Do you have housing? : Yes      Are you worried about losing your housing?: No       10 point Review of  Systems is negative except for right foot pain which is noted in HPI.     /50   Pulse 66   LMP 10/19/2024   SpO2 99%     BMI= There is no height or weight on file to calculate BMI.    OBJECTIVE:  General appearance: Patient is alert and fully cooperative with history & exam.  No sign of distress is noted during the visit.    Vascular: Dorsalis pedis and posterior tibial pulses are palpable. There is pedal hair growth right.  CFT < 3 sec from anterior tibial surface to distal digits right. There is no appreciable edema noted.  Dermatologic: Turgor and texture are within normal limits. No coloration or temperature changes. No primary or secondary lesions noted.  Neurologic: All epicritic and proprioceptive sensations are grossly intact right.  Musculoskeletal: Pain to palpation along medial base plantar surface proximal phalanx right hallux.      Imaging:     CT Abdomen Pelvis w/o Contrast    Result Date: 11/2/2024  EXAM: CT ABDOMEN PELVIS W/O CONTRAST LOCATION: [facility] DATE: 11/2/2024 INDICATION: right pelvic pressure; UTI; assess for stone COMPARISON: 8/14/2017 TECHNIQUE: CT scan of the abdomen and pelvis was performed without IV contrast. Multiplanar reformats were obtained. Dose reduction techniques were used. CONTRAST: None. FINDINGS: LOWER CHEST: Normal. HEPATOBILIARY: Normal. PANCREAS: Normal. SPLEEN: Normal. ADRENAL GLANDS: Normal. KIDNEYS/BLADDER: Normal. BOWEL: Normal. LYMPH NODES: Normal. VASCULATURE: Normal. PELVIC ORGANS: Normal. MUSCULOSKELETAL: Normal.     IMPRESSION: No acute findings or other explanation for symptoms.        Again, thank you for allowing me to participate in the care of your patient.        Sincerely,        Robert Crane DPM

## 2024-11-26 NOTE — PROGRESS NOTES
FOOT AND ANKLE SURGERY/PODIATRY PROGRESS NOTE        ASSESSMENT: Closed minimally displaced fracture proximal phalanx right hallux       TREATMENT:  -I discussed the patient that she continues to have discomfort along the medial plantar base of the proximal phalanx of the right hallux on exam today.    -I reviewed the patient's weightbearing x-rays from today which indicate bony healing as compared to the previous visit but continues to indicate a mildly displaced intra-articular fracture along the medial base of the proximal phalanx of the hallux.    -Based on the above, we again discussed that because she has shown evidence of bony healing it is not unreasonable to remain nonweightbearing for additional 2 to 3 weeks and give the fracture site more time for consolidation.  We also discussed surgical treatment to include removal of the fracture fragment and smoothing of the base of the proximal phalanx.  Risks of surgery include but not limited to ongoing symptoms, infection and long-term degenerative changes.    -After discussion of the above the patient like to discuss further with her .  I have asked her to contact my office with her decision on course of treatment.    -Patient's questions invited and answered. She was encouraged to call my office with any further questions or concerns.     30 minutes spent on the day of encounter doing chart review, history and exam, documentation, and further activities as noted.     Robert Crane DPM  [facility] Podiatry/Foot & Ankle Surgery      HPI: Regina Nguyen was seen again today for a close displaced fracture proximal phalanx right hallux.  Patient has been nonweightbearing with knee walker and cam boot and states that she continues to have discomfort along the base of the big toe on the right foot.    Past Medical History:   Diagnosis Date    Anemia     Anxiety disorder     anxiety    Cervical high risk HPV (human papillomavirus) test positive  04/27/2021    3/25/13 ASCUS, neg HPV @ 23 yo 2/4/14 ASCUS, neg HPV @ 22 yo 11/15/17 NIL 4/27/21 NIL pap, +HR HPV (not 16/18). Plan: cotest in 1 year    Disease of thyroid gland     hyperthyroidism; postpartum    Thyroiditis        Past Surgical History:   Procedure Laterality Date    APPENDECTOMY      7th grade    IR BLOOD PATCH  5/20/2017    LAPAROSCOPY DIAGNOSTIC (GYN) Bilateral 11/15/2023    Procedure: DIAGNOSTIC LAPAROSCOPY WITH BILATERAL SALPINGECTOMY , HYSTEROSCOPY DILATION AND CURETTAGE POLYPECTOMY;  Surgeon: Alli, Ricky Traore MD;  Location: [facility]       Allergies   Allergen Reactions    Benadryl [Diphenhydramine] Unknown         Current Outpatient Medications:     ibuprofen (ADVIL/MOTRIN) 600 MG tablet, Take 1 tablet (600 mg) by mouth every 6 hours as needed, Disp: 30 tablet, Rfl: 1    venlafaxine (EFFEXOR-XR) 75 MG 24 hr capsule, Take 1 capsule (75 mg) by mouth daily, Disp: 30 capsule, Rfl: 2    zolpidem (AMBIEN) 5 MG tablet, TAKE ONE TABLET BY MOUTH AT BEDTIME AS NEEDED FOR SLEEP, Disp: 30 tablet, Rfl: 5    Family History   Problem Relation Age of Onset    Heart Disease Father         a. fib    Heart Disease Mother     Coronary Artery Disease Mother 44.00        a second at 50+    No Known Problems Brother     No Known Problems Daughter     No Known Problems Son     Heart Disease Maternal Grandmother     Heart Disease Maternal Grandfather     No Known Problems Brother        Social History     Socioeconomic History    Marital status:      Spouse name: Not on file    Number of children: Not on file    Years of education: Not on file    Highest education level: Not on file   Occupational History    Not on file   Tobacco Use    Smoking status: Never     Passive exposure: Never    Smokeless tobacco: Never   Vaping Use    Vaping status: Never Used   Substance and Sexual Activity    Alcohol use: Yes     Comment: Alcoholic Drinks/day: every  so often; 2-3 drinks per week    Drug use: No     Sexual activity: Yes     Partners: Male     Birth control/protection: Condom   Other Topics Concern    Not on file   Social History Narrative    Not on file     Social Drivers of Health     Financial Resource Strain: Low Risk  (11/9/2023)    Financial Resource Strain     Within the past 12 months, have you or your family members you live with been unable to get utilities (heat, electricity) when it was really needed?: No   Food Insecurity: Low Risk  (11/9/2023)    Food Insecurity     Within the past 12 months, did you worry that your food would run out before you got money to buy more?: No     Within the past 12 months, did the food you bought just not last and you didn t have money to get more?: No   Transportation Needs: Low Risk  (11/9/2023)    Transportation Needs     Within the past 12 months, has lack of transportation kept you from medical appointments, getting your medicines, non-medical meetings or appointments, work, or from getting things that you need?: No   Physical Activity: Not on file   Stress: Not on file (2/26/2021)   Social Connections: Not on file   Interpersonal Safety: Low Risk  (11/9/2023)    Interpersonal Safety     Do you feel physically and emotionally safe where you currently live?: Yes     Within the past 12 months, have you been hit, slapped, kicked or otherwise physically hurt by someone?: No     Within the past 12 months, have you been humiliated or emotionally abused in other ways by your partner or ex-partner?: No   Housing Stability: Low Risk  (11/9/2023)    Housing Stability     Do you have housing? : Yes     Are you worried about losing your housing?: No       10 point Review of Systems is negative except for right foot pain which is noted in HPI.     /50   Pulse 66   LMP 10/19/2024   SpO2 99%     BMI= There is no height or weight on file to calculate BMI.    OBJECTIVE:  General appearance: Patient is alert and fully cooperative with history & exam.  No sign of distress  is noted during the visit.    Vascular: Dorsalis pedis and posterior tibial pulses are palpable. There is pedal hair growth right.  CFT < 3 sec from anterior tibial surface to distal digits right. There is no appreciable edema noted.  Dermatologic: Turgor and texture are within normal limits. No coloration or temperature changes. No primary or secondary lesions noted.  Neurologic: All epicritic and proprioceptive sensations are grossly intact right.  Musculoskeletal: Pain to palpation along medial base plantar surface proximal phalanx right hallux.      Imaging:     CT Abdomen Pelvis w/o Contrast    Result Date: 11/2/2024  EXAM: CT ABDOMEN PELVIS W/O CONTRAST LOCATION: [facility] DATE: 11/2/2024 INDICATION: right pelvic pressure; UTI; assess for stone COMPARISON: 8/14/2017 TECHNIQUE: CT scan of the abdomen and pelvis was performed without IV contrast. Multiplanar reformats were obtained. Dose reduction techniques were used. CONTRAST: None. FINDINGS: LOWER CHEST: Normal. HEPATOBILIARY: Normal. PANCREAS: Normal. SPLEEN: Normal. ADRENAL GLANDS: Normal. KIDNEYS/BLADDER: Normal. BOWEL: Normal. LYMPH NODES: Normal. VASCULATURE: Normal. PELVIC ORGANS: Normal. MUSCULOSKELETAL: Normal.     IMPRESSION: No acute findings or other explanation for symptoms.

## 2024-11-26 NOTE — PATIENT INSTRUCTIONS
Regina,      Your surgery with [facility] has been ordered. A  will contact you in the next few days to schedule. Or feel free to call [phone] to schedule sooner.     Procedure:   ORIF    Procedure Date :     *A surgery nurse will call you 1-2 days before surgery to inform you of the time of arrival for surgery.    Surgeon:   Dr. Robert Crane    Admission Type:   Outpatient    Procedure Location:   [facility]:  [address] [place] [address] (phone: [phone], Fax: [phone])    Please park in Lot [address]. Enter through the main entrance. Check in at the Welcome Desk and you will be directed to the surgery unit.         Preparation Instructions to complete:    []  You will need a Pre-op Physical within 30 days before surgery with your primary care provider. This exam is required by the anesthesiologist to ensure a safe surgical experience.    Failure  to obtain your pre-op physical will lead to cancellation of your surgery  Call them right away to schedule this. Please ensure your Preoperative Physical is faxed to the Hospital (numbers listed above)    [] Preoperative Medication Instructions  We would like you to stop your anticoagulation medications 3-5 days before surgery HOWEVER contact your prescribing provider for instructions before discontinuing any medications. (Examples: Coumadin, Plavix, Xarelto, Eliquis, Pradaxa, Effient, Brilinta) If you are on Coumadin, we would like the goal INR <= 1.2.  IT IS OK TO STAY ON ASPIRIN PRIOR TO SURGERY.   Hold Ibuprofen 24 hours prior.   Hold Herbal Supplements and vitamins 14 days prior.   Stop Cialis, Levitra and Viagra 2-3 days before surgery.   Please discuss with your primary care provider if you need to hold any blood pressure medications or others.   If you take these diabetic medications, please discuss with your primary doctor and follow the hold instructions:   []  Hold seven (7) days prior for once  weekly injectable doses [semaglutide (Ozempic, Wegovy), dulaglutide (Trulicity), exenatide ER (Bydureon), tirzepatide (Mounjaro)]  []  Hold the day before and day of for once daily injectable GLP-1 agonists [exenatide (Byetta), liraglutide (Saxenda, Victoza)]  []  Hold seven (7) days for oral semaglutide (Rybelsus)     [] Fasting Requirements  Nothing to eat for 8 hours before surgery unless instructed differently by the surgery nurse.  You may have clear liquids up to two hours before your arrival time (coffee, tea, water, or Gatorade. No cream or milk)  If your primary care provider has instructed you to continue oral medications, you may take them on the morning of surgery with a small sip of water.    No alcohol or smoking after 12:00am the day of surgery    [] Contact your insurance regarding coverage  If you would like a Good [place]renice Estimate for your upcoming procedure at [facility] Location, contact Cost of Care Estimates   Advocates are available be phone Monday through Friday 9am - 3pm   at [phone]  You may also submit a request online at [email or web address]. Select the Pricing Form under the Pricing Inquiry Section.    For all self-pay, estimate, or anesthesia billing questions at [facility], the contact information is below:    Who to contact: Cadence GARBER  [facility] Anesthesia Network number: [phone]  Prepay number: [phone]    The billing office at the [facility] will contact you with the facility charge estimate but you may also reach them at [phone] with questions.    [] DO NOT BRING FMLA WITH TO SURGERY.  These should be sent to the provider's office by fax to [phone].    [] Day of Surgery  Medications - Take as indicated with sips of water.   Wear comfortable loose fitting clothes. Wear your glasses-Not contacts. Do not wear jewelry and remove body piercing's. Surgery may be  cancelled if they are not removed.   You may have 1 family member wait in the lobby during your surgery. Visitor restrictions are subject to change. Please verify with the surgery nurse when they call.   If same day surgery-Have a someone come with you to surgery that can help you understand the surgeon's instructions, drive you home and stay with you overnight the first night.    [] You will receive a call from a surgery nurse 1-3 days prior to surgery. They will go over more details with you.         Frequently Asked Questions    WHAT DO I DO WHEN I COME HOME FROM SURGERY?    After surgery and/ or your hospital stay you may be tired and drowsy. Rest, but make sure to get up and walk around every couple of hours to circulate your blood. If you are non-weight bearing do not put any weight on your foot.  Be sure to take your medications as directed. Try to get a restful sleep and begin more normal activities as tolerated.    HOW MUCH PAIN SHOULD I EXPECT AND WILL I HAVE PAIN MEDICATION?    Everyone tolerates pain differently. Still, each type of surgery involves a certain level and type of pain. Generally most people feel better within a few days but keep in mind that everyone has a different tolerance to pain. All medications should be taken as directed. All medications can have side effects such as bleeding, upset stomach, headaches, dizziness, constipation, etc. If you have any major problems or allergic reaction, stop the medication and call the office. If you only have a little pain, you may simply take Tylenol or Ibuprofen as directed on the label.     WHAT ABOUT MY DRESSING AND WHEN DO I COME BACK TO THE OFFICE?    The outer dressing stays in place for 7 days and when you come in for your first post-op we will remove it.  Some patients may have staples, zipper or sutures; these stay in for 10-14 days and will be removed at your 2nd post-op visit. After 24 hours you may shower using shower precautions.    WHAT  ABOUT SWELLING, REDNESS, BRUISING OR DRAINAGE?    It is normal to have some swelling, and bruising after surgery. It gradually subsides but may be present for several weeks. Elevation and icing will help to reduce the swelling more rapidly.  If your bandage is really tight after 24 hours you may cut the bandage an inch by the toes or under your foot and by your ankle.  Ice therapy often works better than oral pain medication. Using cold therapy is recommended every hour for 20 minutes.    WHEN CAN I TAKE A BATH?    You can take a bath as long as the wound has no drainage and is fully healed without a scab. This generally takes about 2 weeks.  Unless you get the bandaged protector from above then you can take a shower when you feel up to it.        Can be found at [employer], or [employer]     WHEN CAN I RETURN TO WORK?    You may return to work to an office-type job whenever you feel comfortable enough. The only restriction would be your own motivation and pain tolerance. If your job requires vigorous activities you may be off 1-4 weeks which is determined by what surgery you have and your operating physician.     WHAT ABOUT DRIVING OR FLYING?    As a general rule, you may resume driving as soon as you are comfortable and fully alert and off narcotic pain medications. If you are traveling by plane within 4 weeks of surgery, perform range of motion exercises of the legs and feet during the flight. Get up and walk around frequently to prevent blood clots.      WHEN CAN I EXERSICE?    You may return to normal activities such as exercising when your surgeon tells you usually 2 weeks. Walking, sitting, standing and going up the stairs are all fine after the 2-week denise. You may have restrictions for 1-4 weeks of no lifting, pushing, pulling in excess of 20 lbs. This is determined by what surgery you have and your surgeon.    WILL I BECOME ADDICTED TO MY PAIN MEDICATION?    Pain medications are safe and effective when  used as directed. However, misuse of these products can be extremely harmful and even deadly. Pain medications must be taken only as directed by your surgeon. Do not change the dose of your pain medication without talking to your operating physician first.    POSTOPERATIVE INFORMATION: MANAGING PAIN  Measuring Your Pain    A pain scale helps you rate pain intensity. In the scale, 0 means no pain, and 10 is the worst pain possible.  You should rate your pain every 4-6 hours. You may feel some pain even with medications. It is important to tell your health care provider if medications don't reduce the pain.        Managing Post-Op Pain at Home: Medications    Pain after an operation (post-op pain) is common and expected. These guidelines can help you stay as comfortable as possible.    Taking Pain Medications    Take any prescribed pain medications as directed by your doctor.  Take pain medications with some food to avoid an upset stomach.  Be aware that narcotic pain medications cause constipation. We recommend taking Milk of Magnesia   Eating high-fiber foods and drink plenty of water.  Don t drink alcohol while using pain medications.  Possible side effects include stomach upset, nausea, and itching.    Alternating Ibuprofen with your pain medication    Ibuprofen has three actions: it reduces fever, relieves pain and fights inflammation. Alternate between your prescribed pain medication and Ibuprofen every three hours (i.e., take a dose of Ibuprofen then three hours later take your prescribed pain medication then three hours later Ibuprofen, ect.) These two medications are safe to use together like this.    POSTOPERATIVE INFORMATION: CONSTIPATION    Constipation after surgery is a common problem and is often related to taking post-operative narcotic pain medication. Signs that you may be constipated include bloating, abdominal distention and/or pain.    Constipation Prevention & Treatment    Drink plenty of fluids!  This is the most important thing you can do to help prevent constipation.  Increase physical activity as recommended by your surgeon.  Consume a high fiber diet. We recommend introducing high fiber foods pre-operatively. Some foods high in fiber include:    Oatmeal Bran  Flaxseed Dried Fruit    Carrots   Bananas Strawberries Oranges Whole Grain Bread     Bananas Prunes  Pears  Raspberries     Over the counter medication/supplements - in order of recommended treatment:   (Be sure to follow instructions on product packaging)    Fiber supplement   Bulk forming laxative - Can be used to prevent constipation  Great food sources of fiber include but are not limited to:  Colace (docusate sodium)  Osmotic stool softener - Can be used 2-3 times per day to prevent constipation  Milk of Magnesia  MIRALAX  Enema/Suppository    Patient can also  some probiotics such as Culturelle to help prevent Antibiotic associated diarrhea. They can take this 1 hour before or 2 hours after taking their antibiotic should not be taken at the same time as they can cancel out the effects.                          ** AFTER SURGERY INSTRUCTIONS **                          [] You are to remain NON WEIGHT BEARING on your right foot NON WEIGHT BEARING MEANS NO PRESSURE ON YOUR FOOT OR HEEL AT ANY TIME FOR ANY REASON!    [] During surgery   will apply a gauze dressing to your foot. This will remain intact until you are seen in clinic for follow up    [] It is NOT OKAY to get your surgical site wet while bathing, you will need to purchase a cast cover to protect your foot from getting wet. You can purchase this at [facility] or your local pharmacy.    [] It is important that you elevate your affected foot after surgery. Proper elevation is raising your foot above your waist. The fluid in your lower extremities needs to get back to your heart so it can get pumped to your kidneys and expelled through urination. So if you notice  you have to go to the bathroom more frequently when you are elevating your leg this is a good sign that it is working.     [] It is important that you increase your protein intake after surgery. Protein is essential for wound healing. We recommend you take a protein supplement twice per day. This is in addition to your normal diet. Examples of protein supplements are Ensure, Boost, Glucerna (if you are diabetic), or protein powder. You can purchase these at your local retailer or grocery store.       Notify our office right away, if you have any changes in your health status, or if you develop a cold, flu, diarrhea, infection, fever or sore throat before your scheduled surgery date. We can be reached at [phone]   Monday-Friday 8 am - 4:30 pm if you have any questions.     Thank you for trusting us with your care!      For informational purposes only. Not to replace the advice of your health care provider. Copyright   2020 [facility]. All rights reserved. Clinically reviewed by Infection Prevention and the [facility] COVID-19 Clinical Team. [employer] [id number] - Rev 09/09/21.

## 2024-11-26 NOTE — TELEPHONE ENCOUNTER
Spoke with patient.  Dr. Crane will not order any narcotic pain medications including Tylenol 3.  Patient declined toradol, she will continue with tylenol and ibuprofen.    
unable to assess immunization status...

## 2024-12-02 ENCOUNTER — HOSPITAL ENCOUNTER (OUTPATIENT)
Facility: AMBULATORY SURGERY CENTER | Age: 34
End: 2024-12-02
Attending: PODIATRIST
Payer: COMMERCIAL

## 2024-12-02 ENCOUNTER — PREP FOR PROCEDURE (OUTPATIENT)
Dept: OTHER | Facility: CLINIC | Age: 34
End: 2024-12-02
Payer: COMMERCIAL

## 2024-12-02 ENCOUNTER — TELEPHONE (OUTPATIENT)
Dept: VASCULAR SURGERY | Facility: CLINIC | Age: 34
End: 2024-12-02
Payer: COMMERCIAL

## 2024-12-02 DIAGNOSIS — S92.901D CLOSED FRACTURE OF RIGHT FOOT WITH ROUTINE HEALING, SUBSEQUENT ENCOUNTER: Primary | ICD-10-CM

## 2024-12-02 NOTE — TELEPHONE ENCOUNTER
Surgery Scheduled    Discussed surgery plans/instructions. The pt will schedule a pre-op physical. Will send surgery packet once medications have been reviewed.    Surgery/Procedure: OPEN REDUCTION INTERNAL FIXATION right hallux (Right)     CPT: 36592    Equipment: [place] cannulated screw 2.0/2.4, mini c-arm.     **No rep needed for this procedure as I do not anticipate using fixation**    Location: [facility]:  [address] [place] [address] [address] [place] [address] (Fax: [phone])   Please enter through the main entrance. Take the elevators to the 3rd floor. Check in at  of [address].     Date: 12/16/2024    Time: 1:30pm    Admission Type: Outpatient    Surgeon: Dr. Robert Crane    OR Confirmed & :  Yes email sent to [facility] Schedulers on 12/2/2024    IR Tech Needed:  No     Entered on provider calendar:  Yes    Post Op: See appt desk.     Wound Vac Needed: No    Home Care Needed: No    Ultrasound Contacted: Not needed    Reps Contacted: Not needed    Blood Thinners Addressed:  Message sent to support pool to review.     Stress Test Clearance: NO

## 2024-12-02 NOTE — TELEPHONE ENCOUNTER
Reviewed Blood Thinners and plan for holding, continuing and/or bridging:  NA    Reviewed Diabetic medications that are GLP-1 agonists: NA  Please discuss with your primary doctor and follow the hold instructions:   []  Hold seven (7) days prior for once weekly injectable doses [semaglutide (Ozempic, Wegovy), dulaglutide (Trulicity), exenatide ER (Bydureon), tirzepatide (Mounjaro)]  []  Hold the day before and day of for once daily injectable GLP-1 agonists [exenatide (Byetta), liraglutide (Saxenda, Victoza)]  []  Hold seven (7) days for oral semaglutide (Rybelsus)

## 2024-12-10 ENCOUNTER — OFFICE VISIT (OUTPATIENT)
Dept: INTERNAL MEDICINE | Facility: CLINIC | Age: 34
End: 2024-12-10
Payer: COMMERCIAL

## 2024-12-10 VITALS
OXYGEN SATURATION: 99 % | WEIGHT: 135 LBS | SYSTOLIC BLOOD PRESSURE: 120 MMHG | RESPIRATION RATE: 18 BRPM | TEMPERATURE: 98.5 F | HEART RATE: 88 BPM | BODY MASS INDEX: 23.05 KG/M2 | DIASTOLIC BLOOD PRESSURE: 62 MMHG | HEIGHT: 64 IN

## 2024-12-10 DIAGNOSIS — F41.1 ANXIETY STATE: ICD-10-CM

## 2024-12-10 DIAGNOSIS — S92.901D CLOSED FRACTURE OF RIGHT FOOT WITH ROUTINE HEALING, SUBSEQUENT ENCOUNTER: ICD-10-CM

## 2024-12-10 DIAGNOSIS — Z01.818 PREOPERATIVE EXAMINATION: Primary | ICD-10-CM

## 2024-12-10 DIAGNOSIS — G47.00 INSOMNIA, UNSPECIFIED TYPE: ICD-10-CM

## 2024-12-10 PROCEDURE — G2211 COMPLEX E/M VISIT ADD ON: HCPCS | Performed by: NURSE PRACTITIONER

## 2024-12-10 PROCEDURE — 99214 OFFICE O/P EST MOD 30 MIN: CPT | Performed by: NURSE PRACTITIONER

## 2024-12-10 RX ORDER — LATANOPROST 50 UG/ML
SOLUTION/ DROPS OPHTHALMIC
COMMUNITY
Start: 2024-09-18

## 2024-12-10 NOTE — H&P (VIEW-ONLY)
Preoperative Evaluation  [facility]  [address] [address]  Phone: [phone]  Fax: [phone]  Primary Provider: Valerio Neves MD  Pre-op Performing Provider: Haider Dillard CNP  Dec 10, 2024             12/10/2024   Surgical Information   What procedure is being done? big toe surgery    Facility or Hospital where procedure/surgery will be performed: [facility]    Who is doing the procedure / surgery? dr cantrell    Date of surgery / procedure: december 16    Time of surgery / procedure: tbd    Where do you plan to recover after surgery? at home with family        Patient-reported     Fax number for surgical facility: Note does not need to be faxed, will be available electronically in Epic.    Assessment & Plan     The proposed surgical procedure is considered LOW risk.    Preoperative examination  No contraindications for planned procedure.  She is status post tubal ligation    Closed fracture of right foot with routine healing, subsequent encounter  Surgery for definitive treatment    Anxiety  Controlled on venlafaxine    Insomnia, unspecified type  Controlled on zolpidem    Thyroiditis  This was a postpartum related issue and was temporary.  She does not currently have any thyroid related issues        Patient Instructions   Hold all supplements, aspirin and NSAIDs for 7 days prior to surgery.     Follow your surgeon's direction on when to stop eating and drinking prior to surgery.    Your surgeon will be managing your pain after your surgery.      Remove all jewelry and metal piercings before your surgery.     Remove nail polish from fingers before surgery.    If you use a CPAP machine, bring this with you to surgery.      The longitudinal plan of care for the diagnosis(es)/condition(s) as documented were addressed during this visit. Due to the added complexity in care, I will continue to support Regina in the subsequent management  and with ongoing continuity of care.          - No identified additional risk factors other than previously addressed         Recommendation  Approval given to proceed with proposed procedure, without further diagnostic evaluation.    Subjective   Regina is a 34 year old, presenting for the following:  Pre-Op Exam (Dr Royce Nj, 12/16, @ [facility])          12/10/2024     6:55 AM   Additional Questions   Roomed by Desirae JANSEN   Accompanied by chani     HPI related to upcoming procedure: As above        12/10/2024   Pre-Op Questionnaire   Have you ever had a heart attack or stroke? No    Have you ever had surgery on your heart or blood vessels, such as a stent placement, a coronary artery bypass, or surgery on an artery in your head, neck, heart, or legs? No    Do you have chest pain with activity? No    Do you have a history of heart failure? No    Do you currently have a cold, bronchitis or symptoms of other infection? No    Do you have a cough, shortness of breath, or wheezing? No    Do you or anyone in your family have previous history of blood clots? No    Do you or does anyone in your family have a serious bleeding problem such as prolonged bleeding following surgeries or cuts? No    Have you ever had problems with anemia or been told to take iron pills? No    Have you had any abnormal blood loss such as black, tarry or bloody stools, or abnormal vaginal bleeding? No    Have you ever had a blood transfusion? No    Are you willing to have a blood transfusion if it is medically needed before, during, or after your surgery? Yes    Have you or any of your relatives ever had problems with anesthesia? No    Do you have sleep apnea, excessive snoring or daytime drowsiness? No    Do you have any artifical heart valves or other implanted medical devices like a pacemaker, defibrillator, or continuous glucose monitor? No    Do you have artificial joints? No    Are you allergic to latex? No         Patient-reported     Health Care Directive  Patient does not have a Health Care Directive: Discussed advance care planning with patient; however, patient declined at this time.    Preoperative Review of    reviewed - controlled substances reflected in medication list.          Patient Active Problem List    Diagnosis Date Noted    Closed fracture of right foot with routine healing, subsequent encounter 12/02/2024     Priority: Medium    Cervical high risk HPV (human papillomavirus) test positive 04/27/2021     Priority: Medium     3/25/13 ASCUS, neg HPV @ 25 yo  2/4/14 ASCUS, neg HPV @ 22 yo  11/15/17 NIL  4/27/21 NIL pap, +HR HPV (not 16/18). Plan: cotest in 1 year  06/15/22 NIL Pap, + HR HPV (not 16 or 18) Plan Jonancy due bef 09/15/22.   06/23/22 Pt notified by phone.   08/10/22 Jonancy Koilocytosis, suggestive but not diagnostic of HPV effect no LIYAH Plan cotest in 1 year due 08/10/23 per guidelines  07/13/23 NIL Pap, Neg HR HPV Plan cotest in 3 years       Pituitary disease (H) 05/22/2017     Priority: Medium    Intractable headache 05/19/2017     Priority: Medium    Headache 05/18/2017     Priority: Medium    Positional headache 05/17/2017     Priority: Medium    Hearing loss 05/17/2017     Priority: Medium    Anxiety      Priority: Medium     Created by Conversion  Replacement Utility updated for latest IMO load        Hair loss 09/03/2015     Priority: Medium    Thyroiditis 02/19/2015     Priority: Medium     postpartum        Tachycardia 02/12/2015     Priority: Medium      Past Medical History:   Diagnosis Date    Anemia     Anxiety disorder     anxiety    Cervical high risk HPV (human papillomavirus) test positive 04/27/2021    3/25/13 ASCUS, neg HPV @ 25 yo 2/4/14 ASCUS, neg HPV @ 22 yo 11/15/17 NIL 4/27/21 NIL pap, +HR HPV (not 16/18). Plan: cotest in 1 year    Disease of thyroid gland     hyperthyroidism; postpartum    Thyroiditis      Past Surgical History:   Procedure Laterality Date    APPENDECTOMY    "   7th grade    IR BLOOD PATCH  5/20/2017    LAPAROSCOPY DIAGNOSTIC (GYN) Bilateral 11/15/2023    Procedure: DIAGNOSTIC LAPAROSCOPY WITH BILATERAL SALPINGECTOMY , HYSTEROSCOPY DILATION AND CURETTAGE POLYPECTOMY;  Surgeon: Ricky Carson MD;  Location: [facility] OR     Current Outpatient Medications   Medication Sig Dispense Refill    ibuprofen (ADVIL/MOTRIN) 600 MG tablet Take 1 tablet (600 mg) by mouth every 6 hours as needed 30 tablet 1    latanoprost (XALATAN) 0.005 % ophthalmic solution INSTILL 1 DROP INTO AFFECTED EYE(S) ONCE DAILY IN THE EVENING      venlafaxine (EFFEXOR-XR) 75 MG 24 hr capsule Take 1 capsule (75 mg) by mouth daily 30 capsule 2    zolpidem (AMBIEN) 5 MG tablet TAKE ONE TABLET BY MOUTH AT BEDTIME AS NEEDED FOR SLEEP 30 tablet 5       Allergies   Allergen Reactions    Benadryl [Diphenhydramine] Unknown        Social History     Tobacco Use    Smoking status: Never     Passive exposure: Never    Smokeless tobacco: Never   Substance Use Topics    Alcohol use: Yes     Comment: Alcoholic Drinks/day: every  so often; 2-3 drinks per week       History   Drug Use No               Objective    LMP 11/15/2024 (Approximate)    Estimated body mass index is 23.17 kg/m  as calculated from the following:    Height as of 11/2/24: 1.626 m (5' 4\").    Weight as of 11/2/24: 61.2 kg (135 lb).  Physical Exam  GENERAL: alert and no distress  NECK: no adenopathy, no asymmetry, masses, or scars  RESP: lungs clear to auscultation - no rales, rhonchi or wheezes  CV: regular rate and rhythm, normal S1 S2, no S3 or S4, no murmur, click or rub, no peripheral edema  ABDOMEN: soft, nontender, no hepatosplenomegaly, no masses and bowel sounds normal  MS: no gross musculoskeletal defects noted, no edema    Recent Labs   Lab Test 11/02/24  1309   HGB 14.5         POTASSIUM 4.4   CR 0.73        Diagnostics  No labs were ordered during this visit.   No EKG required, no history of coronary heart disease, " significant arrhythmia, peripheral arterial disease or other structural heart disease.    Revised Cardiac Risk Index (RCRI)  The patient has the following serious cardiovascular risks for perioperative complications:   - No serious cardiac risks = 0 points     RCRI Interpretation: 1 point: Class II (low risk - 0.9% complication rate)         Signed Electronically by: Haider Dillard CNP  A copy of this evaluation report is provided to the requesting physician.

## 2024-12-10 NOTE — PROGRESS NOTES
Preoperative Evaluation  [facility]  [address] [facility] [address]  Phone: [phone]  Fax: [phone]  Primary Provider: Valerio Neves MD  Pre-op Performing Provider: Haider Dillard CNP  Dec 10, 2024             12/10/2024   Surgical Information   What procedure is being done? big toe surgery    Facility or Hospital where procedure/surgery will be performed: [facility]    Who is doing the procedure / surgery? dr cantrell    Date of surgery / procedure: december 16    Time of surgery / procedure: tbd    Where do you plan to recover after surgery? at home with family        Patient-reported     Fax number for surgical facility: Note does not need to be faxed, will be available electronically in Epic.    Assessment & Plan     The proposed surgical procedure is considered LOW risk.    Preoperative examination  No contraindications for planned procedure.  She is status post tubal ligation    Closed fracture of right foot with routine healing, subsequent encounter  Surgery for definitive treatment    Anxiety  Controlled on venlafaxine    Insomnia, unspecified type  Controlled on zolpidem    Thyroiditis  This was a postpartum related issue and was temporary.  She does not currently have any thyroid related issues        Patient Instructions   Hold all supplements, aspirin and NSAIDs for 7 days prior to surgery.     Follow your surgeon's direction on when to stop eating and drinking prior to surgery.    Your surgeon will be managing your pain after your surgery.      Remove all jewelry and metal piercings before your surgery.     Remove nail polish from fingers before surgery.    If you use a CPAP machine, bring this with you to surgery.      The longitudinal plan of care for the diagnosis(es)/condition(s) as documented were addressed during this visit. Due to the added complexity in care, I will continue to support Regina in the subsequent management  and with ongoing continuity of care.          - No identified additional risk factors other than previously addressed         Recommendation  Approval given to proceed with proposed procedure, without further diagnostic evaluation.    Subjective   Regina is a 34 year old, presenting for the following:  Pre-Op Exam (Dr Royce Nj, 12/16, @ [facility])          12/10/2024     6:55 AM   Additional Questions   Roomed by Desirae JANSEN   Accompanied by chani     HPI related to upcoming procedure: As above        12/10/2024   Pre-Op Questionnaire   Have you ever had a heart attack or stroke? No    Have you ever had surgery on your heart or blood vessels, such as a stent placement, a coronary artery bypass, or surgery on an artery in your head, neck, heart, or legs? No    Do you have chest pain with activity? No    Do you have a history of heart failure? No    Do you currently have a cold, bronchitis or symptoms of other infection? No    Do you have a cough, shortness of breath, or wheezing? No    Do you or anyone in your family have previous history of blood clots? No    Do you or does anyone in your family have a serious bleeding problem such as prolonged bleeding following surgeries or cuts? No    Have you ever had problems with anemia or been told to take iron pills? No    Have you had any abnormal blood loss such as black, tarry or bloody stools, or abnormal vaginal bleeding? No    Have you ever had a blood transfusion? No    Are you willing to have a blood transfusion if it is medically needed before, during, or after your surgery? Yes    Have you or any of your relatives ever had problems with anesthesia? No    Do you have sleep apnea, excessive snoring or daytime drowsiness? No    Do you have any artifical heart valves or other implanted medical devices like a pacemaker, defibrillator, or continuous glucose monitor? No    Do you have artificial joints? No    Are you allergic to latex? No         Patient-reported     Health Care Directive  Patient does not have a Health Care Directive: Discussed advance care planning with patient; however, patient declined at this time.    Preoperative Review of    reviewed - controlled substances reflected in medication list.          Patient Active Problem List    Diagnosis Date Noted    Closed fracture of right foot with routine healing, subsequent encounter 12/02/2024     Priority: Medium    Cervical high risk HPV (human papillomavirus) test positive 04/27/2021     Priority: Medium     3/25/13 ASCUS, neg HPV @ 25 yo  2/4/14 ASCUS, neg HPV @ 22 yo  11/15/17 NIL  4/27/21 NIL pap, +HR HPV (not 16/18). Plan: cotest in 1 year  06/15/22 NIL Pap, + HR HPV (not 16 or 18) Plan Mora due bef 09/15/22.   06/23/22 Pt notified by phone.   08/10/22 Mora Koilocytosis, suggestive but not diagnostic of HPV effect no LIYAH Plan cotest in 1 year due 08/10/23 per guidelines  07/13/23 NIL Pap, Neg HR HPV Plan cotest in 3 years       Pituitary disease (H) 05/22/2017     Priority: Medium    Intractable headache 05/19/2017     Priority: Medium    Headache 05/18/2017     Priority: Medium    Positional headache 05/17/2017     Priority: Medium    Hearing loss 05/17/2017     Priority: Medium    Anxiety      Priority: Medium     Created by Conversion  Replacement Utility updated for latest IMO load        Hair loss 09/03/2015     Priority: Medium    Thyroiditis 02/19/2015     Priority: Medium     postpartum        Tachycardia 02/12/2015     Priority: Medium      Past Medical History:   Diagnosis Date    Anemia     Anxiety disorder     anxiety    Cervical high risk HPV (human papillomavirus) test positive 04/27/2021    3/25/13 ASCUS, neg HPV @ 23 yo 2/4/14 ASCUS, neg HPV @ 22 yo 11/15/17 NIL 4/27/21 NIL pap, +HR HPV (not 16/18). Plan: cotest in 1 year    Disease of thyroid gland     hyperthyroidism; postpartum    Thyroiditis      Past Surgical History:   Procedure Laterality Date    APPENDECTOMY    "   7th grade    IR BLOOD PATCH  5/20/2017    LAPAROSCOPY DIAGNOSTIC (GYN) Bilateral 11/15/2023    Procedure: DIAGNOSTIC LAPAROSCOPY WITH BILATERAL SALPINGECTOMY , HYSTEROSCOPY DILATION AND CURETTAGE POLYPECTOMY;  Surgeon: Ricky Carson MD;  Location: [facility] OR     Current Outpatient Medications   Medication Sig Dispense Refill    ibuprofen (ADVIL/MOTRIN) 600 MG tablet Take 1 tablet (600 mg) by mouth every 6 hours as needed 30 tablet 1    latanoprost (XALATAN) 0.005 % ophthalmic solution INSTILL 1 DROP INTO AFFECTED EYE(S) ONCE DAILY IN THE EVENING      venlafaxine (EFFEXOR-XR) 75 MG 24 hr capsule Take 1 capsule (75 mg) by mouth daily 30 capsule 2    zolpidem (AMBIEN) 5 MG tablet TAKE ONE TABLET BY MOUTH AT BEDTIME AS NEEDED FOR SLEEP 30 tablet 5       Allergies   Allergen Reactions    Benadryl [Diphenhydramine] Unknown        Social History     Tobacco Use    Smoking status: Never     Passive exposure: Never    Smokeless tobacco: Never   Substance Use Topics    Alcohol use: Yes     Comment: Alcoholic Drinks/day: every  so often; 2-3 drinks per week       History   Drug Use No               Objective    LMP 11/15/2024 (Approximate)    Estimated body mass index is 23.17 kg/m  as calculated from the following:    Height as of 11/2/24: 1.626 m (5' 4\").    Weight as of 11/2/24: 61.2 kg (135 lb).  Physical Exam  GENERAL: alert and no distress  NECK: no adenopathy, no asymmetry, masses, or scars  RESP: lungs clear to auscultation - no rales, rhonchi or wheezes  CV: regular rate and rhythm, normal S1 S2, no S3 or S4, no murmur, click or rub, no peripheral edema  ABDOMEN: soft, nontender, no hepatosplenomegaly, no masses and bowel sounds normal  MS: no gross musculoskeletal defects noted, no edema    Recent Labs   Lab Test 11/02/24  1309   HGB 14.5         POTASSIUM 4.4   CR 0.73        Diagnostics  No labs were ordered during this visit.   No EKG required, no history of coronary heart disease, " significant arrhythmia, peripheral arterial disease or other structural heart disease.    Revised Cardiac Risk Index (RCRI)  The patient has the following serious cardiovascular risks for perioperative complications:   - No serious cardiac risks = 0 points     RCRI Interpretation: 1 point: Class II (low risk - 0.9% complication rate)         Signed Electronically by: Haider Dillard CNP  A copy of this evaluation report is provided to the requesting physician.

## 2024-12-13 ENCOUNTER — ANESTHESIA EVENT (OUTPATIENT)
Dept: SURGERY | Facility: AMBULATORY SURGERY CENTER | Age: 34
End: 2024-12-13
Payer: COMMERCIAL

## 2024-12-13 VITALS — HEIGHT: 64 IN | WEIGHT: 135 LBS | BODY MASS INDEX: 23.05 KG/M2

## 2024-12-13 RX ORDER — ACETAMINOPHEN 325 MG/1
975 TABLET ORAL ONCE
Status: CANCELLED | OUTPATIENT
Start: 2024-12-13 | End: 2024-12-13

## 2024-12-13 RX ORDER — LIDOCAINE 40 MG/G
CREAM TOPICAL
Status: CANCELLED | OUTPATIENT
Start: 2024-12-13

## 2024-12-13 RX ORDER — MAGNESIUM SULFATE HEPTAHYDRATE 40 MG/ML
4 INJECTION, SOLUTION INTRAVENOUS ONCE
Status: CANCELLED | OUTPATIENT
Start: 2024-12-13 | End: 2024-12-13

## 2024-12-14 ENCOUNTER — MYC REFILL (OUTPATIENT)
Dept: FAMILY MEDICINE | Facility: CLINIC | Age: 34
End: 2024-12-14
Payer: COMMERCIAL

## 2024-12-14 DIAGNOSIS — G47.00 INSOMNIA, UNSPECIFIED TYPE: ICD-10-CM

## 2024-12-15 RX ORDER — FENTANYL CITRATE 0.05 MG/ML
25-100 INJECTION, SOLUTION INTRAMUSCULAR; INTRAVENOUS
Status: CANCELLED | OUTPATIENT
Start: 2024-12-15

## 2024-12-15 RX ORDER — NALOXONE HYDROCHLORIDE 0.4 MG/ML
0.1 INJECTION, SOLUTION INTRAMUSCULAR; INTRAVENOUS; SUBCUTANEOUS
Status: CANCELLED | OUTPATIENT
Start: 2024-12-15

## 2024-12-15 RX ORDER — FLUMAZENIL 0.1 MG/ML
0.2 INJECTION, SOLUTION INTRAVENOUS
Status: CANCELLED | OUTPATIENT
Start: 2024-12-15

## 2024-12-16 ENCOUNTER — ANESTHESIA (OUTPATIENT)
Dept: SURGERY | Facility: AMBULATORY SURGERY CENTER | Age: 34
End: 2024-12-16
Payer: COMMERCIAL

## 2024-12-16 RX ORDER — ZOLPIDEM TARTRATE 5 MG/1
5 TABLET ORAL
Qty: 30 TABLET | Refills: 5 | Status: SHIPPED | OUTPATIENT
Start: 2024-12-16

## 2024-12-23 ENCOUNTER — HOSPITAL ENCOUNTER (OUTPATIENT)
Facility: AMBULATORY SURGERY CENTER | Age: 34
Discharge: HOME | End: 2024-12-23
Attending: PODIATRIST
Payer: COMMERCIAL

## 2024-12-23 VITALS
RESPIRATION RATE: 14 BRPM | TEMPERATURE: 97.3 F | HEART RATE: 97 BPM | SYSTOLIC BLOOD PRESSURE: 121 MMHG | DIASTOLIC BLOOD PRESSURE: 65 MMHG | OXYGEN SATURATION: 98 %

## 2024-12-23 DIAGNOSIS — S92.901D CLOSED FRACTURE OF RIGHT FOOT WITH ROUTINE HEALING, SUBSEQUENT ENCOUNTER: ICD-10-CM

## 2024-12-23 RX ORDER — NALOXONE HYDROCHLORIDE 0.4 MG/ML
0.1 INJECTION, SOLUTION INTRAMUSCULAR; INTRAVENOUS; SUBCUTANEOUS
Status: DISCONTINUED | OUTPATIENT
Start: 2024-12-23 | End: 2024-12-24 | Stop reason: HOSPADM

## 2024-12-23 RX ORDER — CEFAZOLIN SODIUM 1 G/3ML
INJECTION, POWDER, FOR SOLUTION INTRAMUSCULAR; INTRAVENOUS PRN
Status: DISCONTINUED | OUTPATIENT
Start: 2024-12-23 | End: 2024-12-23

## 2024-12-23 RX ORDER — LIDOCAINE 40 MG/G
CREAM TOPICAL
Status: DISCONTINUED | OUTPATIENT
Start: 2024-12-23 | End: 2024-12-24 | Stop reason: HOSPADM

## 2024-12-23 RX ORDER — SODIUM CHLORIDE, SODIUM LACTATE, POTASSIUM CHLORIDE, CALCIUM CHLORIDE 600; 310; 30; 20 MG/100ML; MG/100ML; MG/100ML; MG/100ML
INJECTION, SOLUTION INTRAVENOUS CONTINUOUS
Status: DISCONTINUED | OUTPATIENT
Start: 2024-12-23 | End: 2024-12-24 | Stop reason: HOSPADM

## 2024-12-23 RX ORDER — ONDANSETRON 4 MG/1
4 TABLET, ORALLY DISINTEGRATING ORAL EVERY 30 MIN PRN
Status: DISCONTINUED | OUTPATIENT
Start: 2024-12-23 | End: 2024-12-24 | Stop reason: HOSPADM

## 2024-12-23 RX ORDER — PROPOFOL 10 MG/ML
INJECTION, EMULSION INTRAVENOUS PRN
Status: DISCONTINUED | OUTPATIENT
Start: 2024-12-23 | End: 2024-12-23

## 2024-12-23 RX ORDER — ONDANSETRON 2 MG/ML
4 INJECTION INTRAMUSCULAR; INTRAVENOUS EVERY 30 MIN PRN
Status: DISCONTINUED | OUTPATIENT
Start: 2024-12-23 | End: 2024-12-24 | Stop reason: HOSPADM

## 2024-12-23 RX ORDER — PROPOFOL 10 MG/ML
INJECTION, EMULSION INTRAVENOUS CONTINUOUS PRN
Status: DISCONTINUED | OUTPATIENT
Start: 2024-12-23 | End: 2024-12-23

## 2024-12-23 RX ORDER — MAGNESIUM HYDROXIDE 1200 MG/15ML
LIQUID ORAL PRN
Status: DISCONTINUED | OUTPATIENT
Start: 2024-12-23 | End: 2024-12-23 | Stop reason: HOSPADM

## 2024-12-23 RX ORDER — ACETAMINOPHEN 325 MG/1
975 TABLET ORAL ONCE
Status: COMPLETED | OUTPATIENT
Start: 2024-12-23 | End: 2024-12-23

## 2024-12-23 RX ORDER — OXYCODONE HYDROCHLORIDE 10 MG/1
10 TABLET ORAL
Status: DISCONTINUED | OUTPATIENT
Start: 2024-12-23 | End: 2024-12-24 | Stop reason: HOSPADM

## 2024-12-23 RX ORDER — FENTANYL CITRATE 0.05 MG/ML
25 INJECTION, SOLUTION INTRAMUSCULAR; INTRAVENOUS
Status: DISCONTINUED | OUTPATIENT
Start: 2024-12-23 | End: 2024-12-24 | Stop reason: HOSPADM

## 2024-12-23 RX ORDER — BUPIVACAINE HYDROCHLORIDE 5 MG/ML
INJECTION, SOLUTION EPIDURAL; INTRACAUDAL
Status: COMPLETED | OUTPATIENT
Start: 2024-12-23 | End: 2024-12-23

## 2024-12-23 RX ORDER — DEXAMETHASONE SODIUM PHOSPHATE 4 MG/ML
4 INJECTION, SOLUTION INTRA-ARTICULAR; INTRALESIONAL; INTRAMUSCULAR; INTRAVENOUS; SOFT TISSUE
Status: DISCONTINUED | OUTPATIENT
Start: 2024-12-23 | End: 2024-12-24 | Stop reason: HOSPADM

## 2024-12-23 RX ORDER — ONDANSETRON 2 MG/ML
INJECTION INTRAMUSCULAR; INTRAVENOUS PRN
Status: DISCONTINUED | OUTPATIENT
Start: 2024-12-23 | End: 2024-12-23

## 2024-12-23 RX ORDER — FENTANYL CITRATE 0.05 MG/ML
100 INJECTION, SOLUTION INTRAMUSCULAR; INTRAVENOUS
Status: DISCONTINUED | OUTPATIENT
Start: 2024-12-23 | End: 2024-12-24 | Stop reason: HOSPADM

## 2024-12-23 RX ORDER — OXYCODONE HYDROCHLORIDE 5 MG/1
5 TABLET ORAL
Status: DISCONTINUED | OUTPATIENT
Start: 2024-12-23 | End: 2024-12-24 | Stop reason: HOSPADM

## 2024-12-23 RX ORDER — DEXAMETHASONE SODIUM PHOSPHATE 4 MG/ML
INJECTION, SOLUTION INTRA-ARTICULAR; INTRALESIONAL; INTRAMUSCULAR; INTRAVENOUS; SOFT TISSUE PRN
Status: DISCONTINUED | OUTPATIENT
Start: 2024-12-23 | End: 2024-12-23

## 2024-12-23 RX ORDER — OXYCODONE HYDROCHLORIDE 5 MG/1
5-10 TABLET ORAL EVERY 4 HOURS PRN
Qty: 12 TABLET | Refills: 0 | Status: SHIPPED | OUTPATIENT
Start: 2024-12-23

## 2024-12-23 RX ORDER — LIDOCAINE HYDROCHLORIDE 20 MG/ML
INJECTION, SOLUTION INFILTRATION; PERINEURAL PRN
Status: DISCONTINUED | OUTPATIENT
Start: 2024-12-23 | End: 2024-12-23

## 2024-12-23 RX ORDER — FLUMAZENIL 0.1 MG/ML
0.2 INJECTION, SOLUTION INTRAVENOUS
Status: DISCONTINUED | OUTPATIENT
Start: 2024-12-23 | End: 2024-12-24 | Stop reason: HOSPADM

## 2024-12-23 RX ADMIN — LIDOCAINE HYDROCHLORIDE 3 ML: 20 INJECTION, SOLUTION INFILTRATION; PERINEURAL at 13:22

## 2024-12-23 RX ADMIN — SODIUM CHLORIDE, SODIUM LACTATE, POTASSIUM CHLORIDE, CALCIUM CHLORIDE: 600; 310; 30; 20 INJECTION, SOLUTION INTRAVENOUS at 12:04

## 2024-12-23 RX ADMIN — PROPOFOL 160 MCG/KG/MIN: 10 INJECTION, EMULSION INTRAVENOUS at 13:22

## 2024-12-23 RX ADMIN — ONDANSETRON 4 MG: 2 INJECTION INTRAMUSCULAR; INTRAVENOUS at 13:25

## 2024-12-23 RX ADMIN — BUPIVACAINE HYDROCHLORIDE 10 ML: 5 INJECTION, SOLUTION EPIDURAL; INTRACAUDAL at 12:50

## 2024-12-23 RX ADMIN — PROPOFOL 50 MG: 10 INJECTION, EMULSION INTRAVENOUS at 13:22

## 2024-12-23 RX ADMIN — BUPIVACAINE HYDROCHLORIDE 20 ML: 5 INJECTION, SOLUTION EPIDURAL; INTRACAUDAL at 12:47

## 2024-12-23 RX ADMIN — FENTANYL CITRATE 100 MCG: 0.05 INJECTION, SOLUTION INTRAMUSCULAR; INTRAVENOUS at 12:42

## 2024-12-23 RX ADMIN — CEFAZOLIN SODIUM 2 G: 1 INJECTION, POWDER, FOR SOLUTION INTRAMUSCULAR; INTRAVENOUS at 13:21

## 2024-12-23 RX ADMIN — DEXAMETHASONE SODIUM PHOSPHATE 4 MG: 4 INJECTION, SOLUTION INTRA-ARTICULAR; INTRALESIONAL; INTRAMUSCULAR; INTRAVENOUS; SOFT TISSUE at 13:29

## 2024-12-23 RX ADMIN — ACETAMINOPHEN 975 MG: 325 TABLET ORAL at 11:58

## 2024-12-23 NOTE — OP NOTE
Date: 12/23/2024    Surgeon: PAULO Crane DPM    Preoperative diagnosis: Fracture right hallux    Postoperative diagnosis: Same    Procedure:   1.  Removal of fracture fragment right hallux  2.  Arthrotomy first MPJ right foot  3.  Fluoroscopy right foot    Anesthesia: Popliteal block with IV-sedation    Hemostasis: Pneumatic ankle tourniquet 250 mmHg    Pathology: Bone    Injectables: none    Materials: 4-0 vicryl, 4-0 nylon    Complications: None    Blood loss: 1 cc      Findings: Patient presents for operative intervention for a painful fracture fragment on the right hallux.  Patient has attempted conservative treatment for a fracture along the medial base of the proximal phalanx right hallux.  Despite remaining nonweightbearing for several weeks she continues to have discomfort at this location.  We discussed treatment options including attempted ORIF of the fracture fragment versus removal of the fracture fragment.  I discussed today surgical procedure to include possible ORIF but given the size of the fragment removal of the bony fragment along the medial base of the proximal phalanx of the hallux is more likely.  Risks include but limited to need for additional surgical invention including ongoing discomfort.  All questions invited and answered.  Consent has been obtained.  Conservative measures were attempted and exhausted. Patient questions invited and answered, including appropriate risk, benefits and complications. No guarantees given or implied. Patient has been NPO.    Description: Patient was brought to the operating room and placed on the table in supine position. IV-sedation with popliteal block was administered by the anesthesia department.  The foot was then prepped and draped in usual aseptic manner. The extremity was elevated and exsanguinated. Well-padded ankle pneumatic tourniquet was inflated to 250mmHg and the following procedure was then performed: Attention was directed to the medial aspect  of the right hallux where interoperative fluoroscopy was then used to visualize the base of the proximal phalanx and specifically the fracture fragment along the medial base of the proximal phalanx of the hallux.  At this time a #15 blade was then used to create a 3 cm incision at this location with care taken to protect all neurovascular structures and cauterize any superficial bleeders.  Again the incision was deepened to level of the subcutaneous tissue and joint capsule of the first MPJ with care taken to protect all neurovascular structures and cauterize any superficial bleeders.  Next, #15 blade was then used to create a linear incision along the first MPJ capsule and was reflected from the base of the proximal phalanx via sharp dissection.  At this time a Tuckerman elevator was then used to locate the fracture fragment along the medial base of the proximal phalanx.  At this time it was determined that the fracture fragment was too small to allow for screw fixation.  Next, a #15 blade was then used to excise and release the fracture fragment from the surrounding soft tissue which was then removed from the surgical site in toto and sent to pathology.  A hand-held rasp was then used to smooth the edges of the medial base of the proximal phalanx.  Next, normal saline was then used to irrigate the surgical site including the first MPJ is to perform an arthrotomy.  At this time fluoroscopy was then used to visualize the base of the proximal phalanx.  Next, 4-0 Vicryl was then used to reapproximate the first MPJ capsule in a simple suture fashion.  The subcutaneous tissue was then reapproximated using 4-0 Vicryl in a running suture fashion.  And the skin was closed using 4-0 nylon in a simple suture fashion.    Dressings consisted of adaptic, 4x4's, paty roll and an ace wrap. The pneumatic tourniquet was released and a hyperemic response was noted to the digits on the right foot.     The patient appeared to tolerate  all the procedures and anesthesia well without apparent complications. Patient was transported from the operating room to the recovery room with vital signs stable and neurovascular status as it was pre-operatively to the right foot. Patient to be discharged home per anesthesia. Written and verbal homecare instructions given to remain nonweightbearing on the right foot, surgical dressing to remain intact, elevate right foot above waist at all times.  Cam boot for stability.  Patient to follow up in office for post-operative management within the next 7 to 10 days.

## 2024-12-23 NOTE — ANESTHESIA PROCEDURE NOTES
Sciatic Procedure Note    Pre-Procedure   Staff -        Anesthesiologist:  Raul Hammer MD       Performed By: anesthesiologist       Location: pre-op       Procedure Start/Stop Times: 12/23/2024 12:45 PM and 12/23/2024 12:47 PM       Pre-Anesthestic Checklist: patient identified, IV checked, site marked, risks and benefits discussed, informed consent, monitors and equipment checked, pre-op evaluation, at physician/surgeon's request and post-op pain management  Timeout:       Correct Patient: Yes        Correct Procedure: Yes        Correct Site: Yes        Correct Position: Yes        Correct Laterality: Yes        Site Marked: Yes  Procedure Documentation  Procedure: Sciatic       Laterality: right       Patient Position: supine       Patient Prep/Sterile Barriers: sterile gloves, mask       Skin prep: Chloraprep (popliteal approach).       Needle Type: other (Stimuplex)       Needle Gauge: 20.        Needle Length (Inches): 4        1. Ultrasound was used to identify targeted nerve, plexus, vascular marker, or fascial plane and place a needle adjacent to it in real-time.       2. Ultrasound was used to visualize the spread of anesthetic in close proximity to the above referenced structure.       3. A permanent image is entered into the patient's record.       4. The visualized anatomic structures appeared normal.       5. There were no apparent abnormal pathologic findings.    Assessment/Narrative         The placement was negative for: blood aspirated and painful injection       Paresthesias: No.       Bolus given via needle..        Secured via.        Complications: none       Injection made incrementally with aspirations every 5 mL.    Medication(s) Administered   Bupivacaine 0.5% PF (Infiltration) - Infiltration   20 mL - 12/23/2024 12:47:00 PM  Medication Administration Time: 12/23/2024 12:45 PM      FOR [facility] ([facility]) ONLY:   Pain Team Contact information: please page the Pain Team Via  "[employer]. Search \"Pain\". During daytime hours, please page the attending first. At night please page the resident first.      "

## 2024-12-23 NOTE — DISCHARGE INSTRUCTIONS
You have received 975 mg of Acetaminophen (Tylenol) at 11:58. Please do not take an additional dose of Tylenol until after 5:58 pm     Do not exceed 4,000 mg of acetaminophen during a 24 hour period and keep in mind that acetaminophen can also be found in many over-the-counter cold medications as well as narcotics that may be given for pain.      Adult Discharge Orders & Instructions     For 24 hours after surgery    Get plenty of rest.  A responsible adult must stay with you for at least 24 hours after you leave the hospital.   Do not drive or use heavy equipment.  If you have weakness or tingling, don't drive or use heavy equipment until this feeling goes away.  Do not drink alcohol.  Avoid strenuous or risky activities.  Ask for help when climbing stairs.   You may feel lightheaded.  IF so, sit for a few minutes before standing.  Have someone help you get up.   If you have nausea (feel sick to your stomach): Drink only clear liquids such as apple juice, ginger ale, broth or 7-Up.  Rest may also help.  Be sure to drink enough fluids.  Move to a regular diet as you feel able.  You may have a slight fever. Call the doctor if your fever is over 100 F (37.7 C) (taken under the tongue) or lasts longer than 24 hours.  You may have a dry mouth, a sore throat, muscle aches or trouble sleeping.  These should go away after 24 hours.  Do not make important or legal decisions.     Call your doctor for any of the followin.  Signs of infection (fever, growing tenderness at the surgery site, a large amount of drainage or bleeding, severe pain, foul-smelling drainage, redness, swelling).    2. It has been over 8 to 10 hours since surgery and you are still not able to urinate (pass water).    3.  Headache for over 24 hours.    Apply ice every hour while awake for 15-20 minutes at a time. Elevate extremity as much as possible.     If you have any questions or concerns regarding your procedure please contact Dr. Crane,   office number is [phone]

## 2024-12-23 NOTE — PROGRESS NOTES
Timeout performed: Yes    Time timeout performed: 1240    Time procedure began: 1242    Block Assessment/Safety: (prior to administration of Versed and Fentanyl)    Nerve block pamphlet: given    Safety instructions discussed and reviewed: Yes    Vitals and RASS score pre-procedure and every 5 minutes throughout procedure until block completed: All vitals have been reviewed.    Cardiac Rhythm: Normal sinus     Pain scale used: 0-10 Numeric Pain Rating Scale, with 10 being the worst pain imaginable    Pain prior to procedure: 0/10    Pain during procedure: 0/10    Pain post-procedure: 0/10    Time procedure ended: 1250    Patient observed 15 minutes post-procedure: Yes    Signs of local toxicity Signs/Symptoms: No  (CNS symptoms (may not occur): erin-oral numbness/tingling, tinnitus, metallic taste, seizures  CV collapse: rhythm disturbance, hypotension, bradycardia, altered consciousness)

## 2024-12-23 NOTE — ANESTHESIA PROCEDURE NOTES
Adductor canal Procedure Note    Pre-Procedure   Staff -        Anesthesiologist:  Raul Hammer MD       Performed By: anesthesiologist       Location: pre-op       Procedure Start/Stop Times: 12/23/2024 12:47 PM and 12/23/2024 12:50 PM       Pre-Anesthestic Checklist: patient identified, IV checked, site marked, risks and benefits discussed, informed consent, monitors and equipment checked, pre-op evaluation, at physician/surgeon's request and post-op pain management  Timeout:       Correct Patient: Yes        Correct Procedure: Yes        Correct Site: Yes        Correct Position: Yes        Correct Laterality: Yes        Site Marked: Yes  Procedure Documentation  Procedure: Adductor canal       Laterality: right       Patient Position: supine       Patient Prep/Sterile Barriers: sterile gloves, mask       Skin prep: Chloraprep       Needle Type: other (Stimuplex)       Needle Gauge: 20.        Needle Length (Inches): 4        Ultrasound guided       1. Ultrasound was used to identify targeted nerve, plexus, vascular marker, or fascial plane and place a needle adjacent to it in real-time.       2. Ultrasound was used to visualize the spread of anesthetic in close proximity to the above referenced structure.       3. A permanent image is entered into the patient's record.       4. The visualized anatomic structures appeared normal.       5. There were no apparent abnormal pathologic findings.    Assessment/Narrative         The placement was negative for: blood aspirated and painful injection       Paresthesias: No.       Bolus given via needle..        Secured via.        Complications: none       Injection made incrementally with aspirations every 5 mL.    Medication(s) Administered   Bupivacaine 0.5% PF (Infiltration) - Infiltration   10 mL - 12/23/2024 12:50:00 PM  Medication Administration Time: 12/23/2024 12:47 PM      FOR [facility] ([facility]) ONLY:   Pain Team Contact information: please page the  "Pain Team Via [facility]. Search \"Pain\". During daytime hours, please page the attending first. At night please page the resident first.      " No

## 2024-12-23 NOTE — ANESTHESIA POSTPROCEDURE EVALUATION
Patient: Regina Nguyen    Procedure: Procedure(s):  excision fracture fragment and arthrotomy, right hallux       Anesthesia Type:  General    Note:  Disposition: Outpatient   Postop Pain Control: Uneventful            Sign Out: Well controlled pain   PONV: No   Neuro/Psych: Uneventful            Sign Out: Acceptable/Baseline neuro status   Airway/Respiratory: Uneventful            Sign Out: Acceptable/Baseline resp. status   CV/Hemodynamics: Uneventful            Sign Out: Acceptable CV status   Other NRE:    DID A NON-ROUTINE EVENT OCCUR?            Last vitals:  Vitals Value Taken Time   /65 12/23/24 1354   Temp 97.3  F (36.3  C) 12/23/24 1354   Pulse 88 12/23/24 1416   Resp 14 12/23/24 1354   SpO2 100 % 12/23/24 1416   Vitals shown include unfiled device data.    Electronically Signed By: Raul Hammer MD  December 23, 2024  2:45 PM

## 2024-12-23 NOTE — ANESTHESIA CARE TRANSFER NOTE
Patient: Regina Nguyen    Procedure: Procedure(s):  excision fracture fragment and arthrotomy, right hallux       Diagnosis: Closed fracture of right foot with routine healing, subsequent encounter [T90.759K]  Diagnosis Additional Information: No value filed.    Anesthesia Type:   General     Note:    Oropharynx: oropharynx clear of all foreign objects  Level of Consciousness: awake  Oxygen Supplementation: face mask  Level of Supplemental Oxygen (L/min / FiO2): 6  Independent Airway: airway patency satisfactory and stable  Dentition: dentition unchanged  Vital Signs Stable: post-procedure vital signs reviewed and stable  Report to RN Given: handoff report given  Patient transferred to: Phase II    Handoff Report: Identifed the Patient, Identified the Reponsible Provider, Reviewed the pertinent medical history, Discussed the surgical course, Reviewed Intra-OP anesthesia mangement and issues during anesthesia, Set expectations for post-procedure period and Allowed opportunity for questions and acknowledgement of understanding      Vitals:  Vitals Value Taken Time   /65 12/23/24 1354   Temp 97.3  F (36.3  C) 12/23/24 1354   Pulse 97 12/23/24 1354   Resp 14 12/23/24 1354   SpO2 98 % 12/23/24 1354       Electronically Signed By: SONIA Lane CRNA  December 23, 2024  1:55 PM

## 2024-12-23 NOTE — PLAN OF CARE
Offered patient pregnancy test.  Explained there are risks associated with anesthesia and pregnancy.  Patient verbalizes understanding, denies possibility of pregnancy and declines pregnancy test.    Juan Zendejas RN on 12/23/2024 at 11:48 AM

## 2024-12-23 NOTE — ANESTHESIA PREPROCEDURE EVALUATION
Anesthesia Pre-Procedure Evaluation    Patient: Regina Nguyen   MRN: [id number] : 1990        Procedure : Procedure(s):  OPEN REDUCTION INTERNAL FIXATION right hallux          Past Medical History:   Diagnosis Date    Anemia     Anxiety disorder     anxiety    Cervical high risk HPV (human papillomavirus) test positive 2021    3/25/13 ASCUS, neg HPV @ 23 yo 14 ASCUS, neg HPV @ 22 yo 11/15/17 NIL 21 NIL pap, +HR HPV (not 16/18). Plan: cotest in 1 year    Disease of thyroid gland     hyperthyroidism; postpartum    Thyroiditis       Past Surgical History:   Procedure Laterality Date    APPENDECTOMY      7th grade    IR BLOOD PATCH  2017    LAPAROSCOPY DIAGNOSTIC (GYN) Bilateral 11/15/2023    Procedure: DIAGNOSTIC LAPAROSCOPY WITH BILATERAL SALPINGECTOMY , HYSTEROSCOPY DILATION AND CURETTAGE POLYPECTOMY;  Surgeon: Ricky Carson MD;  Location: [facility] OR      Allergies   Allergen Reactions    Benadryl [Diphenhydramine] Unknown      Social History     Tobacco Use    Smoking status: Never     Passive exposure: Never    Smokeless tobacco: Never   Substance Use Topics    Alcohol use: Yes     Comment: Alcoholic Drinks/day: every  so often; 2-3 drinks per week      Wt Readings from Last 1 Encounters:   12/10/24 61.2 kg (135 lb)        Anesthesia Evaluation            ROS/MED HX  ENT/Pulmonary:  - neg pulmonary ROS     Neurologic:  - neg neurologic ROS     Cardiovascular:  - neg cardiovascular ROS     METS/Exercise Tolerance:     Hematologic:       Musculoskeletal:       GI/Hepatic:       Renal/Genitourinary:       Endo:    (-) thyroid disease   Psychiatric/Substance Use:     (+) psychiatric history anxiety       Infectious Disease:       Malignancy:       Other:            Physical Exam    Airway        Mallampati: I   TM distance: > 3 FB   Neck ROM: full   Mouth opening: > 3 cm    Respiratory Devices and Support         Dental       (+) Completely normal  "teeth      Cardiovascular   cardiovascular exam normal          Pulmonary   pulmonary exam normal                OUTSIDE LABS:  CBC:   Lab Results   Component Value Date    WBC 7.6 11/02/2024    WBC 5.2 11/09/2021    HGB 14.5 11/02/2024    HGB 14.2 11/15/2023    HCT 42.9 11/02/2024    HCT 38.1 11/09/2021     11/02/2024     11/09/2021     BMP:   Lab Results   Component Value Date     11/02/2024     11/09/2021    POTASSIUM 4.4 11/02/2024    POTASSIUM 4.1 11/09/2021    CHLORIDE 99 11/02/2024    CHLORIDE 104 11/09/2021    CO2 26 11/02/2024    CO2 28 11/09/2021    BUN 11.1 11/02/2024    BUN 7 (L) 11/09/2021    CR 0.73 11/02/2024    CR 0.67 11/09/2021    GLC 79 11/02/2024    GLC 88 11/09/2021     COAGS: No results found for: \"PTT\", \"INR\", \"FIBR\"  POC:   Lab Results   Component Value Date    HCG Negative 11/15/2023    HCGS Negative 11/02/2024     HEPATIC:   Lab Results   Component Value Date    ALBUMIN 4.0 11/09/2021    PROTTOTAL 6.7 11/09/2021    ALT <9 11/09/2021    AST 12 11/09/2021    ALKPHOS 52 11/09/2021    BILITOTAL 0.9 11/09/2021     OTHER:   Lab Results   Component Value Date    A1C 4.9 04/27/2021    BIANCA 9.9 11/02/2024    TSH 1.77 11/09/2021       Anesthesia Plan    ASA Status:  2    NPO Status:  NPO Appropriate    Anesthesia Type: General.     - Airway: Mask Only   Induction: Propofol.   Maintenance: TIVA.        Consents    Anesthesia Plan(s) and associated risks, benefits, and realistic alternatives discussed. Questions answered and patient/representative(s) expressed understanding.     - Discussed: Risks, Benefits and Alternatives for BOTH SEDATION and the PROCEDURE were discussed     - Discussed with:  Patient            Postoperative Care    Pain management: Peripheral nerve block (Single Shot), IV analgesics.   PONV prophylaxis: Dexamethasone or Solumedrol, Ondansetron (or other 5HT-3)     Comments:               Raul Hammer MD    I have reviewed the pertinent notes and " labs in the chart from the past 30 days and (re)examined the patient.  Any updates or changes from those notes are reflected in this note.

## 2025-01-06 ENCOUNTER — HOSPITAL ENCOUNTER (OUTPATIENT)
Dept: GENERAL RADIOLOGY | Facility: HOSPITAL | Age: 35
Discharge: HOME | End: 2025-01-06
Attending: PODIATRIST | Admitting: PODIATRIST
Payer: COMMERCIAL

## 2025-01-06 ENCOUNTER — OFFICE VISIT (OUTPATIENT)
Dept: PODIATRY | Facility: CLINIC | Age: 35
End: 2025-01-06
Payer: COMMERCIAL

## 2025-01-06 VITALS
HEART RATE: 84 BPM | SYSTOLIC BLOOD PRESSURE: 132 MMHG | TEMPERATURE: 97.9 F | DIASTOLIC BLOOD PRESSURE: 93 MMHG | OXYGEN SATURATION: 98 % | RESPIRATION RATE: 16 BRPM

## 2025-01-06 DIAGNOSIS — S92.414A CLOSED NONDISPLACED FRACTURE OF PROXIMAL PHALANX OF RIGHT GREAT TOE, INITIAL ENCOUNTER: Primary | ICD-10-CM

## 2025-01-06 DIAGNOSIS — S92.414A CLOSED NONDISPLACED FRACTURE OF PROXIMAL PHALANX OF RIGHT GREAT TOE, INITIAL ENCOUNTER: ICD-10-CM

## 2025-01-06 PROCEDURE — 73630 X-RAY EXAM OF FOOT: CPT | Mod: 26 | Performed by: PODIATRIST

## 2025-01-06 PROCEDURE — 73630 X-RAY EXAM OF FOOT: CPT | Mod: RT

## 2025-01-06 ASSESSMENT — PAIN SCALES - GENERAL: PAINLEVEL_OUTOF10: NO PAIN (0)

## 2025-01-06 NOTE — LETTER
1/6/2025      Regina Nguyen  [address] [facility] [address]      Dear Colleague,    Thank you for referring your patient, Regina Nguyen, to the [facility]. Please see a copy of my visit note below.    Podiatry Progress Note        ASSESSMENT: S/P excision of fracture fragment right hallux      TREATMENT:  -Surgical site on the right foot is progressing well.  I reviewed the patient's x-rays from today which indicate removal of the fracture fragment along the medial base of the proximal phalanx right hallux.    -I discussed with the patient the importance of strict nonweightbearing on the right foot.    -Gauze dressing applied today which she will keep intact.  Cam boot for stability.    -She will follow-up with me in 1 week for suture removal.     Robert Crane DPM  [facility] Podiatry/Foot & Ankle Surgery      HPI: Regina Nguyen was seen again today s/p excision of fracture fragment right hallux.  Doing well today.  She is remain nonweightbearing on the right foot.    Past Medical History:   Diagnosis Date     Anemia      Anxiety disorder     anxiety     Cervical high risk HPV (human papillomavirus) test positive 04/27/2021    3/25/13 ASCUS, neg HPV @ 23 yo 2/4/14 ASCUS, neg HPV @ 24 yo 11/15/17 NIL 4/27/21 NIL pap, +HR HPV (not 16/18). Plan: cotest in 1 year     Disease of thyroid gland     hyperthyroidism; postpartum     Thyroiditis        Allergies   Allergen Reactions     Benadryl [Diphenhydramine] Unknown         Current Outpatient Medications:      ibuprofen (ADVIL/MOTRIN) 600 MG tablet, Take 1 tablet (600 mg) by mouth every 6 hours as needed, Disp: 30 tablet, Rfl: 1     zolpidem (AMBIEN) 5 MG tablet, Take 1 tablet (5 mg) by mouth nightly as needed for sleep., Disp: 30 tablet, Rfl: 5     latanoprost (XALATAN) 0.005 % ophthalmic solution, INSTILL 1 DROP INTO AFFECTED EYE(S) ONCE DAILY IN THE EVENING (Patient not taking: Reported on 1/6/2025), Disp: , Rfl:       oxyCODONE (ROXICODONE) 5 MG tablet, Take 1-2 tablets (5-10 mg) by mouth every 4 hours as needed for moderate to severe pain. (Patient not taking: Reported on 1/6/2025), Disp: 12 tablet, Rfl: 0     venlafaxine (EFFEXOR-XR) 75 MG 24 hr capsule, Take 1 capsule (75 mg) by mouth daily (Patient not taking: Reported on 1/6/2025), Disp: 30 capsule, Rfl: 2    Review of Systems - Negative       OBJECTIVE:  Appearance: alert, well appearing, and in no distress.    BP (!) 132/93   Pulse 84   Temp 97.9  F (36.6  C)   Resp 16   LMP 12/03/2024   SpO2 98%     @LDAVASC(10,16,17)@         Surgical site on the medial right 1st MPJ has intact sutures with skin edges well approximated, no gapping noted. No erythema right foot. Neurovascular status unchanged right foot.       Again, thank you for allowing me to participate in the care of your patient.        Sincerely,        Robert Crane DPM    Electronically signed

## 2025-01-06 NOTE — PROGRESS NOTES
Podiatry Progress Note        ASSESSMENT: S/P excision of fracture fragment right hallux      TREATMENT:  -Surgical site on the right foot is progressing well.  I reviewed the patient's x-rays from today which indicate removal of the fracture fragment along the medial base of the proximal phalanx right hallux.    -I discussed with the patient the importance of strict nonweightbearing on the right foot.    -Gauze dressing applied today which she will keep intact.  Cam boot for stability.    -She will follow-up with me in 1 week for suture removal.     Robert Crane DPM  [facility] Podiatry/Foot & Ankle Surgery      HPI: Regina Nguyen was seen again today s/p excision of fracture fragment right hallux.  Doing well today.  She is remain nonweightbearing on the right foot.    Past Medical History:   Diagnosis Date    Anemia     Anxiety disorder     anxiety    Cervical high risk HPV (human papillomavirus) test positive 04/27/2021    3/25/13 ASCUS, neg HPV @ 23 yo 2/4/14 ASCUS, neg HPV @ 22 yo 11/15/17 NIL 4/27/21 NIL pap, +HR HPV (not 16/18). Plan: cotest in 1 year    Disease of thyroid gland     hyperthyroidism; postpartum    Thyroiditis        Allergies   Allergen Reactions    Benadryl [Diphenhydramine] Unknown         Current Outpatient Medications:     ibuprofen (ADVIL/MOTRIN) 600 MG tablet, Take 1 tablet (600 mg) by mouth every 6 hours as needed, Disp: 30 tablet, Rfl: 1    zolpidem (AMBIEN) 5 MG tablet, Take 1 tablet (5 mg) by mouth nightly as needed for sleep., Disp: 30 tablet, Rfl: 5    latanoprost (XALATAN) 0.005 % ophthalmic solution, INSTILL 1 DROP INTO AFFECTED EYE(S) ONCE DAILY IN THE EVENING (Patient not taking: Reported on 1/6/2025), Disp: , Rfl:     oxyCODONE (ROXICODONE) 5 MG tablet, Take 1-2 tablets (5-10 mg) by mouth every 4 hours as needed for moderate to severe pain. (Patient not taking: Reported on 1/6/2025), Disp: 12 tablet, Rfl: 0    venlafaxine (EFFEXOR-XR) 75 MG 24 hr capsule, Take  1 capsule (75 mg) by mouth daily (Patient not taking: Reported on 1/6/2025), Disp: 30 capsule, Rfl: 2    Review of Systems - Negative       OBJECTIVE:  Appearance: alert, well appearing, and in no distress.    BP (!) 132/93   Pulse 84   Temp 97.9  F (36.6  C)   Resp 16   LMP 12/03/2024   SpO2 98%     @LDAVASC(10,16,17)@         Surgical site on the medial right 1st MPJ has intact sutures with skin edges well approximated, no gapping noted. No erythema right foot. Neurovascular status unchanged right foot.

## 2025-01-09 ENCOUNTER — PATIENT OUTREACH (OUTPATIENT)
Dept: CARE COORDINATION | Facility: CLINIC | Age: 35
End: 2025-01-09
Payer: COMMERCIAL

## 2025-01-13 ENCOUNTER — OFFICE VISIT (OUTPATIENT)
Dept: PODIATRY | Facility: CLINIC | Age: 35
End: 2025-01-13
Payer: COMMERCIAL

## 2025-01-13 VITALS — HEART RATE: 88 BPM | OXYGEN SATURATION: 97 % | RESPIRATION RATE: 16 BRPM

## 2025-01-13 DIAGNOSIS — S92.414A CLOSED NONDISPLACED FRACTURE OF PROXIMAL PHALANX OF RIGHT GREAT TOE, INITIAL ENCOUNTER: Primary | ICD-10-CM

## 2025-01-13 ASSESSMENT — PAIN SCALES - GENERAL: PAINLEVEL_OUTOF10: NO PAIN (0)

## 2025-01-13 NOTE — PROGRESS NOTES
Podiatry Progress Note        ASSESSMENT: S/P excision of fracture fragment right hallux      TREATMENT:  -Surgical site on the right foot is progressing well.  Sutures removed today, Steri-Strips applied.    -I discussed with the patient that I recommend limited walking in the cam boot x 7 days and also keeping the right foot dry over this period of time.  If no concerns she will return to regular shoes after 1 week and gradually increase activity level.    -I have asked her to closely monitor for concerns and return to see me should this occur.    -All questions invited and answered.  Patient is discharged from my care at this time but encouraged to follow-up with any problems questions or concerns as they develop.    Robert Crane DPM  [facility] Podiatry/Foot & Ankle Surgery      HPI: Regina Nguyen was seen again today s/p excision of fracture fragment right hallux.  Doing well today.  She has remained nonweightbearing on the right foot.    Past Medical History:   Diagnosis Date    Anemia     Anxiety disorder     anxiety    Cervical high risk HPV (human papillomavirus) test positive 04/27/2021    3/25/13 ASCUS, neg HPV @ 21 yo 2/4/14 ASCUS, neg HPV @ 24 yo 11/15/17 NIL 4/27/21 NIL pap, +HR HPV (not 16/18). Plan: cotest in 1 year    Disease of thyroid gland     hyperthyroidism; postpartum    Thyroiditis        Allergies   Allergen Reactions    Benadryl [Diphenhydramine] Unknown         Current Outpatient Medications:     ibuprofen (ADVIL/MOTRIN) 600 MG tablet, Take 1 tablet (600 mg) by mouth every 6 hours as needed, Disp: 30 tablet, Rfl: 1    zolpidem (AMBIEN) 5 MG tablet, Take 1 tablet (5 mg) by mouth nightly as needed for sleep., Disp: 30 tablet, Rfl: 5    latanoprost (XALATAN) 0.005 % ophthalmic solution, INSTILL 1 DROP INTO AFFECTED EYE(S) ONCE DAILY IN THE EVENING (Patient not taking: Reported on 1/13/2025), Disp: , Rfl:     oxyCODONE (ROXICODONE) 5 MG tablet, Take 1-2 tablets (5-10 mg) by  mouth every 4 hours as needed for moderate to severe pain. (Patient not taking: Reported on 1/13/2025), Disp: 12 tablet, Rfl: 0    venlafaxine (EFFEXOR-XR) 75 MG 24 hr capsule, Take 1 capsule (75 mg) by mouth daily (Patient not taking: Reported on 1/13/2025), Disp: 30 capsule, Rfl: 2    Review of Systems - Negative       OBJECTIVE:  Appearance: alert, well appearing, and in no distress.    Pulse 88   Resp 16   LMP 12/03/2024   SpO2 97%     Surgical site on the medial right 1st MPJ has intact sutures with skin edges well coapted, no gapping noted. No erythema right foot. Neurovascular status unchanged right foot.

## 2025-01-13 NOTE — PATIENT INSTRUCTIONS
Congratulations!     You can remove the gauze dressing tomorrow!     For the next 2 weeks Dr. Crane would like you to remain LIMITED WEIGHT BEARING MEANS THAT IT IS ONLY OKAY FOR YOU TO APPLY LIGHT PRESSURE ON THE AFFECTED FOOT WHEN TRANSFERRING FROM YOUR ASSISTIVE DEVICE TO A CHAIR OR BED. on your right foot with the use of your CAM BOOT, to allow the newly healed skin to become stronger.    You may begin walking/weight bearing starting 1/21/2025 in a CAM boot. You must wear your CAM boot until you get your shoes/inserts.    You may begin showering as normal in 1/21/2025 weeks

## 2025-03-27 ENCOUNTER — OFFICE VISIT (OUTPATIENT)
Dept: PEDIATRICS | Facility: CLINIC | Age: 35
End: 2025-03-27
Payer: COMMERCIAL

## 2025-03-27 VITALS
WEIGHT: 135 LBS | TEMPERATURE: 98.2 F | DIASTOLIC BLOOD PRESSURE: 76 MMHG | HEIGHT: 65 IN | SYSTOLIC BLOOD PRESSURE: 111 MMHG | HEART RATE: 82 BPM | OXYGEN SATURATION: 98 % | RESPIRATION RATE: 16 BRPM | BODY MASS INDEX: 22.49 KG/M2

## 2025-03-27 DIAGNOSIS — M25.542 JOINT PAIN IN FINGERS OF BOTH HANDS: Primary | ICD-10-CM

## 2025-03-27 DIAGNOSIS — M25.541 JOINT PAIN IN FINGERS OF BOTH HANDS: Primary | ICD-10-CM

## 2025-03-27 RX ORDER — CELECOXIB 100 MG/1
100 CAPSULE ORAL 2 TIMES DAILY
Qty: 60 CAPSULE | Refills: 0 | Status: CANCELLED | OUTPATIENT
Start: 2025-03-27

## 2025-03-27 RX ORDER — NAPROXEN 500 MG/1
500 TABLET ORAL 2 TIMES DAILY WITH MEALS
Qty: 28 TABLET | Refills: 0 | Status: SHIPPED | OUTPATIENT
Start: 2025-03-27

## 2025-03-27 ASSESSMENT — PAIN SCALES - GENERAL: PAINLEVEL_OUTOF10: MILD PAIN (3)

## 2025-03-27 NOTE — PROGRESS NOTES
Assessment & Plan     (M25.541,  M25.542) Joint pain in fingers of both hands  (primary encounter diagnosis)  Comment: Presents with 3-4 weeks of persistent right and left MIP and DIP joint aches/pain that gets better with ibuprofen and worse throughout the day.  Works as a  and types all day.  On exam, mild tenderness of the left and right MIP and DIP joints of the fingers, but no PIP tenderness.  Does have full range of motion in the joints do not look erythematous.  Differential broad including septic arthritis, inflammatory arthritis, overuse injury, carpal tunnel, gout, etc. low concern for septic/inflammatory arthritis as no fevers, full range of motion, and no overlying erythema.  Low concern for an autoimmune condition as she has no rashes or other joint aches or pains and no personal or family history of autoimmune conditions.  Could represent carpal tunnel although she has no numbness or tingling associated with this joint pain.  Most concern for overuse injury.  Will trial high-dose naproxen for 2 weeks and recommended wrist braces if not improving.  If not improving in the next month, advised patient to be seen again for further workup and management.  Plan: naproxen (NAPROSYN) 500 MG tablet      Paulina Tapia is a 34 year old, presenting for the following health issues:  Hand Pain        3/27/2025     7:53 AM   Additional Questions   Roomed by Abby MONSON   Accompanied by Self         3/27/2025     7:53 AM   Patient Reported Additional Medications   Patient reports taking the following new medications NA     History of Present Illness       Reason for visit:  Pain in fingers  Symptom onset:  3-4 weeks ago  Symptoms include:  Pain and weakness  Symptom intensity:  Moderate  Symptom progression:  Worsening  Had these symptoms before:  No  Prior treatment description:  NA  What makes it worse:  End of the day  What makes it better:  No    She eats 2-3 servings of fruits and  "vegetables daily.She consumes 0 sweetened beverage(s) daily.She exercises with enough effort to increase her heart rate 30 to 60 minutes per day.  She exercises with enough effort to increase her heart rate 7 days per week.   She is taking medications regularly.          - Hx of thyroiditis  - Recent right great toe fracture, underwent right excision of the fracture fragment of the right hallux    - Started one month ago, started on left hand and now on both hands  - Located in the 1st and 2nd digits on the right and left PIP and DIP joints, spares the MCP joints  - Throbbing pain, worse after a long day of work and better with ibuprofen  - Not worse in the morning  - No injuries to the hands  - No numbness or tingling  - Never had this before   - Types all day as an  and worse by the end of the day  - No skin changes in the hands  - No fevers, recent illnesses or infections, chills, no rashes  - No other joint pains or aches  - No pain in the wrists or rest of the hand, no muscle issues  - No autoimmune conditions in her personally or others  - Doesn't notice any change in temperature  - Full ROM  - No issues with her thumbs, no angry inflamed joints  - Some pain with weight bearing like picking groceries    Review of Systems  Constitutional, neuro, ENT, endocrine, pulmonary, cardiac, gastrointestinal, genitourinary, musculoskeletal, integument and psychiatric systems are negative, except as otherwise noted.      Objective    /76 (BP Location: Right arm, Patient Position: Sitting, Cuff Size: Adult Regular)   Pulse 82   Temp 98.2  F (36.8  C) (Oral)   Resp 16   Ht 1.651 m (5' 5\")   Wt 61.2 kg (135 lb)   LMP 03/25/2025 (Exact Date)   SpO2 98%   BMI 22.47 kg/m    Body mass index is 22.47 kg/m .  Physical Exam   GENERAL: alert and no distress  EYES: Eyes grossly normal to inspection, PERRL and conjunctivae and sclerae normal  HENT: nose and mouth without ulcers or lesions  NECK: no " adenopathy, no asymmetry, masses, or scars  RESP: lungs clear to auscultation - no rales, rhonchi or wheezes  CV: regular rate and rhythm, normal S1 S2, no S3 or S4, no murmur, click or rub, no peripheral edema  ABDOMEN: soft, nontender, no hepatosplenomegaly, no masses and bowel sounds normal  MS: mild pain to palpation of her right and left MIP and DIP finger joints, no tenderness of the PIP joints, no erythema, overlying rash or increased warmth, full ROM of these joints. No other gross musculoskeletal defects noted, no edema  SKIN: no suspicious lesions or rashes  NEURO: Normal strength and tone, mentation intact and speech normal  PSYCH: mentation appears normal, affect normal/bright          Signed Electronically by: Víctor Olivo MD        I have seen this patient and I was present during all critical and key portions of the procedure/exam and immediately available to furnish services during the entire service. I have reviewed the documentation from this resident and discussed the findings with them, and I agree with the documentation their documentation.      Pernell Denny MD  Internal Medicine and Pediatrics

## 2025-03-27 NOTE — PATIENT INSTRUCTIONS
It was a pleasure to meet you. For today:     - This is likely an overuse injury as you are typing a lot  - Let's try 2 weeks of high dose naproxen 500 mg twice daily for 2 weeks  - Can consider wrist braces if not improving  - Try taking breaks throughout the day to offload the pain of the joints  - If the pain is not getting better, we will have you seen again and will explore other options

## 2025-06-28 ENCOUNTER — MYC REFILL (OUTPATIENT)
Dept: FAMILY MEDICINE | Facility: CLINIC | Age: 35
End: 2025-06-28
Payer: COMMERCIAL

## 2025-06-28 DIAGNOSIS — G47.00 INSOMNIA, UNSPECIFIED TYPE: ICD-10-CM

## 2025-06-29 DIAGNOSIS — G47.00 INSOMNIA, UNSPECIFIED TYPE: ICD-10-CM

## 2025-06-30 RX ORDER — ZOLPIDEM TARTRATE 5 MG/1
5 TABLET ORAL
Qty: 30 TABLET | Refills: 2 | Status: SHIPPED | OUTPATIENT
Start: 2025-06-30

## 2025-06-30 RX ORDER — ZOLPIDEM TARTRATE 5 MG/1
5 TABLET ORAL
Qty: 30 TABLET | Refills: 5 | OUTPATIENT
Start: 2025-06-30